# Patient Record
Sex: FEMALE | Race: WHITE | Employment: FULL TIME | URBAN - NONMETROPOLITAN AREA
[De-identification: names, ages, dates, MRNs, and addresses within clinical notes are randomized per-mention and may not be internally consistent; named-entity substitution may affect disease eponyms.]

---

## 2017-02-03 ENCOUNTER — OFFICE VISIT (OUTPATIENT)
Dept: PRIMARY CARE CLINIC | Age: 42
End: 2017-02-03
Payer: COMMERCIAL

## 2017-02-03 VITALS
HEART RATE: 64 BPM | OXYGEN SATURATION: 98 % | DIASTOLIC BLOOD PRESSURE: 86 MMHG | SYSTOLIC BLOOD PRESSURE: 132 MMHG | WEIGHT: 168 LBS | BODY MASS INDEX: 28.68 KG/M2 | RESPIRATION RATE: 20 BRPM | HEIGHT: 64 IN

## 2017-02-03 DIAGNOSIS — K21.9 GASTROESOPHAGEAL REFLUX DISEASE WITHOUT ESOPHAGITIS: ICD-10-CM

## 2017-02-03 DIAGNOSIS — G44.89 HEADACHE ASSOCIATED WITH HORMONAL FACTORS: Primary | ICD-10-CM

## 2017-02-03 DIAGNOSIS — E03.9 ACQUIRED HYPOTHYROIDISM: ICD-10-CM

## 2017-02-03 PROCEDURE — 99214 OFFICE O/P EST MOD 30 MIN: CPT | Performed by: INTERNAL MEDICINE

## 2017-02-03 RX ORDER — AMITRIPTYLINE HYDROCHLORIDE 10 MG/1
10 TABLET, FILM COATED ORAL NIGHTLY
Qty: 30 TABLET | Refills: 3 | Status: SHIPPED | OUTPATIENT
Start: 2017-02-03 | End: 2017-02-17

## 2017-02-03 RX ORDER — TIZANIDINE 4 MG/1
4 TABLET ORAL EVERY 6 HOURS PRN
Qty: 60 TABLET | Refills: 2 | Status: SHIPPED | OUTPATIENT
Start: 2017-02-03 | End: 2017-07-20 | Stop reason: SDUPTHER

## 2017-02-03 ASSESSMENT — ENCOUNTER SYMPTOMS
VOMITING: 0
DIARRHEA: 0
SHORTNESS OF BREATH: 0
BACK PAIN: 0
NAUSEA: 0
COUGH: 0
CONSTIPATION: 0

## 2017-02-14 ENCOUNTER — OFFICE VISIT (OUTPATIENT)
Dept: PRIMARY CARE CLINIC | Age: 42
End: 2017-02-14
Payer: COMMERCIAL

## 2017-02-14 ENCOUNTER — TELEPHONE (OUTPATIENT)
Dept: PRIMARY CARE CLINIC | Age: 42
End: 2017-02-14

## 2017-02-14 VITALS
OXYGEN SATURATION: 99 % | BODY MASS INDEX: 28.34 KG/M2 | SYSTOLIC BLOOD PRESSURE: 138 MMHG | DIASTOLIC BLOOD PRESSURE: 84 MMHG | HEART RATE: 88 BPM | RESPIRATION RATE: 16 BRPM | HEIGHT: 64 IN | WEIGHT: 166 LBS

## 2017-02-14 DIAGNOSIS — G43.809 OTHER MIGRAINE WITHOUT STATUS MIGRAINOSUS, NOT INTRACTABLE: ICD-10-CM

## 2017-02-14 DIAGNOSIS — E03.9 ACQUIRED HYPOTHYROIDISM: ICD-10-CM

## 2017-02-14 DIAGNOSIS — I10 ESSENTIAL HYPERTENSION: Primary | ICD-10-CM

## 2017-02-14 PROCEDURE — 99213 OFFICE O/P EST LOW 20 MIN: CPT | Performed by: FAMILY MEDICINE

## 2017-02-14 RX ORDER — RIZATRIPTAN BENZOATE 10 MG/1
10 TABLET, ORALLY DISINTEGRATING ORAL
Qty: 9 TABLET | Refills: 3 | Status: SHIPPED | OUTPATIENT
Start: 2017-02-14 | End: 2017-07-20 | Stop reason: SDUPTHER

## 2017-02-14 RX ORDER — LISINOPRIL 10 MG/1
10 TABLET ORAL DAILY
Qty: 30 TABLET | Refills: 3 | Status: SHIPPED | OUTPATIENT
Start: 2017-02-14 | End: 2017-07-20 | Stop reason: SDUPTHER

## 2017-02-14 ASSESSMENT — ENCOUNTER SYMPTOMS
COUGH: 0
SHORTNESS OF BREATH: 0
COLOR CHANGE: 0

## 2017-02-17 ENCOUNTER — OFFICE VISIT (OUTPATIENT)
Dept: OBGYN | Age: 42
End: 2017-02-17
Payer: COMMERCIAL

## 2017-02-17 VITALS
BODY MASS INDEX: 27.83 KG/M2 | WEIGHT: 163 LBS | SYSTOLIC BLOOD PRESSURE: 122 MMHG | HEIGHT: 64 IN | HEART RATE: 80 BPM | DIASTOLIC BLOOD PRESSURE: 85 MMHG

## 2017-02-17 DIAGNOSIS — N92.6 IRREGULAR BLEEDING: ICD-10-CM

## 2017-02-17 DIAGNOSIS — I10 ESSENTIAL HYPERTENSION: ICD-10-CM

## 2017-02-17 DIAGNOSIS — E03.9 ACQUIRED HYPOTHYROIDISM: ICD-10-CM

## 2017-02-17 DIAGNOSIS — G44.89 HEADACHE ASSOCIATED WITH HORMONAL FACTORS: ICD-10-CM

## 2017-02-17 DIAGNOSIS — Z12.4 CERVICAL CANCER SCREENING: ICD-10-CM

## 2017-02-17 DIAGNOSIS — Z12.31 ENCOUNTER FOR SCREENING MAMMOGRAM FOR BREAST CANCER: ICD-10-CM

## 2017-02-17 DIAGNOSIS — Z01.419 WELL WOMAN EXAM WITH ROUTINE GYNECOLOGICAL EXAM: Primary | ICD-10-CM

## 2017-02-17 DIAGNOSIS — K21.9 GASTROESOPHAGEAL REFLUX DISEASE WITHOUT ESOPHAGITIS: ICD-10-CM

## 2017-02-17 LAB
ALBUMIN SERPL-MCNC: 4.7 G/DL (ref 3.5–5.2)
ALP BLD-CCNC: 38 U/L (ref 35–104)
ALT SERPL-CCNC: 24 U/L (ref 5–33)
ANION GAP SERPL CALCULATED.3IONS-SCNC: 16 MMOL/L (ref 7–19)
AST SERPL-CCNC: 17 U/L (ref 5–32)
BILIRUB SERPL-MCNC: 0.4 MG/DL (ref 0.2–1.2)
BUN BLDV-MCNC: 9 MG/DL (ref 6–20)
CALCIUM SERPL-MCNC: 9.5 MG/DL (ref 8.6–10)
CHLORIDE BLD-SCNC: 102 MMOL/L (ref 98–111)
CHOLESTEROL, TOTAL: 167 MG/DL (ref 160–199)
CO2: 25 MMOL/L (ref 22–29)
CREAT SERPL-MCNC: 0.5 MG/DL (ref 0.5–0.9)
GFR NON-AFRICAN AMERICAN: >60
GLOBULIN: 3 G/DL
GLUCOSE BLD-MCNC: 93 MG/DL (ref 74–109)
HCT VFR BLD CALC: 38.2 % (ref 37–47)
HDLC SERPL-MCNC: 67 MG/DL (ref 65–121)
HEMOGLOBIN: 12.8 G/DL (ref 12–16)
LDL CHOLESTEROL CALCULATED: 83 MG/DL
MCH RBC QN AUTO: 31.4 PG (ref 27–31)
MCHC RBC AUTO-ENTMCNC: 33.5 G/DL (ref 33–37)
MCV RBC AUTO: 93.9 FL (ref 81–99)
PDW BLD-RTO: 12.3 % (ref 11.5–14.5)
PLATELET # BLD: 181 K/UL (ref 130–400)
PMV BLD AUTO: 12.8 FL (ref 7.4–10.4)
POTASSIUM SERPL-SCNC: 4.1 MMOL/L (ref 3.5–5)
RBC # BLD: 4.07 M/UL (ref 4.2–5.4)
SODIUM BLD-SCNC: 143 MMOL/L (ref 136–145)
TOTAL PROTEIN: 7.7 G/DL (ref 6.6–8.7)
TRIGL SERPL-MCNC: 85 MG/DL (ref 150–199)
TSH SERPL DL<=0.05 MIU/L-ACNC: 0.61 UIU/ML (ref 0.27–4.2)
WBC # BLD: 5.3 K/UL (ref 4.8–10.8)

## 2017-02-17 PROCEDURE — 99396 PREV VISIT EST AGE 40-64: CPT | Performed by: NURSE PRACTITIONER

## 2017-02-17 RX ORDER — SUMATRIPTAN 50 MG/1
TABLET, FILM COATED ORAL
COMMUNITY
Start: 2017-01-20 | End: 2018-05-03 | Stop reason: SDUPTHER

## 2017-02-17 ASSESSMENT — ENCOUNTER SYMPTOMS
EYES NEGATIVE: 1
RESPIRATORY NEGATIVE: 1
GASTROINTESTINAL NEGATIVE: 1

## 2017-07-20 ENCOUNTER — OFFICE VISIT (OUTPATIENT)
Dept: PRIMARY CARE CLINIC | Age: 42
End: 2017-07-20
Payer: COMMERCIAL

## 2017-07-20 VITALS
RESPIRATION RATE: 18 BRPM | OXYGEN SATURATION: 98 % | HEIGHT: 64 IN | BODY MASS INDEX: 29.19 KG/M2 | WEIGHT: 171 LBS | SYSTOLIC BLOOD PRESSURE: 110 MMHG | DIASTOLIC BLOOD PRESSURE: 78 MMHG | HEART RATE: 66 BPM

## 2017-07-20 DIAGNOSIS — E03.9 ACQUIRED HYPOTHYROIDISM: Primary | ICD-10-CM

## 2017-07-20 DIAGNOSIS — I10 ESSENTIAL HYPERTENSION: ICD-10-CM

## 2017-07-20 DIAGNOSIS — G43.809 OTHER MIGRAINE WITHOUT STATUS MIGRAINOSUS, NOT INTRACTABLE: ICD-10-CM

## 2017-07-20 PROCEDURE — 99214 OFFICE O/P EST MOD 30 MIN: CPT | Performed by: INTERNAL MEDICINE

## 2017-07-20 RX ORDER — TIZANIDINE 4 MG/1
4 TABLET ORAL EVERY 6 HOURS PRN
Qty: 60 TABLET | Refills: 2 | Status: SHIPPED | OUTPATIENT
Start: 2017-07-20 | End: 2018-05-03 | Stop reason: SDUPTHER

## 2017-07-20 RX ORDER — RIZATRIPTAN BENZOATE 10 MG/1
10 TABLET, ORALLY DISINTEGRATING ORAL
Qty: 9 TABLET | Refills: 3 | Status: SHIPPED | OUTPATIENT
Start: 2017-07-20 | End: 2018-04-26

## 2017-07-20 RX ORDER — LISINOPRIL 10 MG/1
10 TABLET ORAL DAILY
Qty: 30 TABLET | Refills: 3 | Status: SHIPPED | OUTPATIENT
Start: 2017-07-20 | End: 2018-09-05 | Stop reason: SDUPTHER

## 2017-07-20 ASSESSMENT — ENCOUNTER SYMPTOMS
SHORTNESS OF BREATH: 0
NAUSEA: 0
CONSTIPATION: 0
VOMITING: 0
COUGH: 0
DIARRHEA: 0
BACK PAIN: 0

## 2017-10-06 ENCOUNTER — HOSPITAL ENCOUNTER (OUTPATIENT)
Dept: WOMENS IMAGING | Age: 42
Discharge: HOME | End: 2017-10-06
Payer: COMMERCIAL

## 2017-10-06 DIAGNOSIS — Z12.31 ENCOUNTER FOR SCREENING MAMMOGRAM FOR BREAST CANCER: ICD-10-CM

## 2017-10-06 PROCEDURE — 77063 BREAST TOMOSYNTHESIS BI: CPT

## 2017-10-23 ENCOUNTER — OFFICE VISIT (OUTPATIENT)
Dept: OBGYN | Age: 42
End: 2017-10-23
Payer: COMMERCIAL

## 2017-10-23 VITALS
DIASTOLIC BLOOD PRESSURE: 92 MMHG | HEIGHT: 64 IN | BODY MASS INDEX: 29.53 KG/M2 | SYSTOLIC BLOOD PRESSURE: 132 MMHG | HEART RATE: 84 BPM | WEIGHT: 173 LBS

## 2017-10-23 DIAGNOSIS — R10.2 PELVIC PAIN IN FEMALE: ICD-10-CM

## 2017-10-23 DIAGNOSIS — N92.6 IRREGULAR BLEEDING: Primary | ICD-10-CM

## 2017-10-23 PROCEDURE — 99213 OFFICE O/P EST LOW 20 MIN: CPT | Performed by: NURSE PRACTITIONER

## 2017-10-23 ASSESSMENT — ENCOUNTER SYMPTOMS
RESPIRATORY NEGATIVE: 1
GASTROINTESTINAL NEGATIVE: 1
EYES NEGATIVE: 1

## 2017-10-23 NOTE — PROGRESS NOTES
Silvestre Garg is a 36 y.o. female who presents today for her medical conditions/ complaints as noted below. Silvestre Garg is c/o of Irregular Menses and Pelvic Pain        HPI  Pt here for continued irregular bleeding and worsening pelvic pain. Had ablation few years ago and has had issues since about 1 year after ablation. She had US last year. Is wanting to discuss having hysterectomy. Last mammogram:  Last pap smear:   Contraception Ablation   : 2  Para: 2  AB:   Last bone density: 0  Last colonoscopy:     Patient's last menstrual period was 10/16/2017 (approximate). [id number]    Past Medical History:   Diagnosis Date    Abnormal glandular Papanicolaou smear of cervix     ASCUS     Acid reflux     Fibroid     Hypothyroidism     Irritable bowel syndrome     Thyroid disease     Hypothyroid     Past Surgical History:   Procedure Laterality Date    CHOLECYSTECTOMY      COLONOSCOPY  ,     DILATION AND CURETTAGE OF UTERUS  10.21.2015    ENDOMETRIAL ABLATION  [id number]    TUBAL LIGATION      UPPER GASTROINTESTINAL ENDOSCOPY       Family History   Problem Relation Age of Onset    Other Father      Carotid blocked     Thyroid Disease Mother     Heart Disease Brother 28     MI    Stroke Paternal Grandmother      Social History   Substance Use Topics    Smoking status: Never Smoker    Smokeless tobacco: Never Used    Alcohol use No       Current Outpatient Prescriptions   Medication Sig Dispense Refill    tiZANidine (ZANAFLEX) 4 MG tablet Take 1 tablet by mouth every 6 hours as needed (pain) 60 tablet 2    lisinopril (PRINIVIL;ZESTRIL) 10 MG tablet Take 1 tablet by mouth daily 30 tablet 3    SUMAtriptan (IMITREX) 50 MG tablet       omeprazole (PRILOSEC) 20 MG capsule Take 40 mg by mouth daily      levothyroxine (SYNTHROID) 175 MCG tablet Take 175 mcg by mouth daily.  cetirizine (ZYRTEC) 10 MG tablet Take 10 mg by mouth daily.       rizatriptan (MAXALT-MLT) 10 MG disintegrating tablet Take 1 tablet by mouth once as needed for Migraine May repeat in 2 hours if needed 9 tablet 3     No current facility-administered medications for this visit. No Known Allergies  Vitals:    10/23/17 1442   BP: (!) 132/92   Pulse: 84     Body mass index is 29.7 kg/m². Review of Systems   Constitutional: Negative. HENT: Negative. Eyes: Negative. Respiratory: Negative. Cardiovascular: Negative. Gastrointestinal: Negative. Genitourinary: Positive for menstrual problem (pt c/o irregular bleeding) and pelvic pain. Negative for dysuria, frequency, urgency and vaginal discharge. Skin: Negative. Neurological: Negative. Psychiatric/Behavioral: Negative. Physical Exam   Constitutional: She is oriented to person, place, and time. She appears well-developed and well-nourished. HENT:   Head: Normocephalic and atraumatic. Eyes: Pupils are equal, round, and reactive to light. Neck: Normal range of motion. Abdominal: There is tenderness in the right lower quadrant, suprapubic area and left lower quadrant. Musculoskeletal: Normal range of motion. Neurological: She is alert and oriented to person, place, and time. Skin: Skin is warm and dry. Psychiatric: She has a normal mood and affect. Her behavior is normal.   Nursing note and vitals reviewed. 1. Irregular bleeding  US Non OB Transvaginal   2. Pelvic pain in female  US Non OB Transvaginal       MEDICATIONS:  No orders of the defined types were placed in this encounter. ORDERS:  Orders Placed This Encounter   Procedures    US Non OB Transvaginal       PLAN:  We had discussion regarding her complaints. She has decided she would like to move forward with the ablation. Will schedule 1/25/18 and to have preop visit with Dr Candice Gu a couple weeks prior. Would like December if any cancellations. She is to schedule US soon so we can compare to last years. All questions answered.    Patient belly.  · You have severe vaginal bleeding. Severe means that you are soaking through your usual pads or tampons every hour for 2 or more hours and passing clots of blood. · You have new symptoms such as fever, urinary problems or unexpected vaginal bleeding. · You are dizzy or lightheaded, or you feel like you may faint. Watch closely for changes in your health, and be sure to contact your doctor if:  · You do not get better as expected. Where can you learn more? Go to [email or web address] and sign in to your [employer] account. Enter [phone] in the [employer] box to learn more about \"Pelvic Pain: Care Instructions. \"     If you do not have an account, please click on the \"Sign Up Now\" link. Current as of: October 13, 2016  Content Version: 11.3  © 0250-7746 [employer]. Care instructions adapted under license by [facility] [address] If you have questions about a medical condition or this instruction, always ask your healthcare professional. [address] any warranty or liability for your use of this information.

## 2017-12-08 ENCOUNTER — HOSPITAL ENCOUNTER (OUTPATIENT)
Dept: ULTRASOUND IMAGING | Age: 42
Discharge: HOME | End: 2017-12-08
Payer: COMMERCIAL

## 2017-12-08 ENCOUNTER — OFFICE VISIT (OUTPATIENT)
Dept: OBGYN | Age: 42
End: 2017-12-08

## 2017-12-08 VITALS
WEIGHT: 173 LBS | HEART RATE: 67 BPM | BODY MASS INDEX: 29.53 KG/M2 | DIASTOLIC BLOOD PRESSURE: 85 MMHG | HEIGHT: 64 IN | TEMPERATURE: 98.2 F | SYSTOLIC BLOOD PRESSURE: 121 MMHG

## 2017-12-08 DIAGNOSIS — R10.2 PELVIC PAIN IN FEMALE: ICD-10-CM

## 2017-12-08 DIAGNOSIS — Z98.890 S/P ENDOMETRIAL ABLATION: ICD-10-CM

## 2017-12-08 DIAGNOSIS — N92.6 IRREGULAR BLEEDING: Primary | ICD-10-CM

## 2017-12-08 DIAGNOSIS — N92.6 IRREGULAR BLEEDING: ICD-10-CM

## 2017-12-08 PROCEDURE — 76830 TRANSVAGINAL US NON-OB: CPT

## 2017-12-08 PROCEDURE — PREOPEXAM PRE-OP EXAM: Performed by: OBSTETRICS & GYNECOLOGY

## 2017-12-08 ASSESSMENT — ENCOUNTER SYMPTOMS
GASTROINTESTINAL NEGATIVE: 1
EYES NEGATIVE: 1
RESPIRATORY NEGATIVE: 1

## 2017-12-08 NOTE — PATIENT INSTRUCTIONS
Patient Education        Laparoscopically Assisted Vaginal Hysterectomy: Before Your Surgery  What is a laparoscopically assisted vaginal hysterectomy? A hysterectomy is surgery to take out the uterus. In some cases, the ovaries and fallopian tubes are taken out at the same time. The doctor makes one or more small cuts in the belly. These cuts are called incisions. They let the doctor insert tools to do the surgery. One of these tools is a tube with a light on it. It's called a laparoscope, or scope. The scope and the other tools allow the doctor to free the uterus. Then the doctor makes a small cut in the vagina. The uterus is taken out through this cut. After the surgery, you will not have periods. You will not be able to get pregnant. If there is a chance that you want to have a baby, talk to your doctor about treatment options. Some women go home the day of surgery and some will stay in the hospital 1 to 2 days after surgery. You will need about 4 to 6 weeks to fully recover. The recovery time may be less for some patients. Follow-up care is a key part of your treatment and safety. Be sure to make and go to all appointments, and call your doctor if you are having problems. It's also a good idea to know your test results and keep a list of the medicines you take. What happens before surgery? ?Surgery can be stressful. This information will help you understand what you can expect. And it will help you safely prepare for surgery. ? Preparing for surgery  ? · Understand exactly what surgery is planned, along with the risks, benefits, and other options. · Tell your doctors ALL the medicines, vitamins, supplements, and herbal remedies you take. Some of these can increase the risk of bleeding or interact with anesthesia. ? · If you take blood thinners, such as warfarin (Coumadin), clopidogrel (Plavix), or aspirin, be sure to talk to your doctor.  He or she will tell you if you should stop taking these

## 2017-12-08 NOTE — PROGRESS NOTES
Subjective:      Patient ID: Shanell Villafuerte is a 36 y.o. female. Patient presents today for a pre op exam.  Patient is scheduled for a TLH with Sandra Phelan on 12/21/17. HPI  Pt is here for pre op for TLH for irregular bleeding, pelvic pain, and s/p endometrial ablation. Shanell Villafuerte is a 36 y.o. female with the following history as recorded in [facility]:  Patient Active Problem List    Diagnosis Date Noted    PMS (premenstrual syndrome) 03/19/2014    Headache associated with hormonal factors 03/19/2014    Hypothyroid 01/15/2011    Uterine fibroid 01/15/2011     Current Outpatient Prescriptions   Medication Sig Dispense Refill    rizatriptan (MAXALT-MLT) 10 MG disintegrating tablet Take 1 tablet by mouth once as needed for Migraine May repeat in 2 hours if needed 9 tablet 3    tiZANidine (ZANAFLEX) 4 MG tablet Take 1 tablet by mouth every 6 hours as needed (pain) 60 tablet 2    lisinopril (PRINIVIL;ZESTRIL) 10 MG tablet Take 1 tablet by mouth daily 30 tablet 3    SUMAtriptan (IMITREX) 50 MG tablet       omeprazole (PRILOSEC) 20 MG capsule Take 40 mg by mouth daily      levothyroxine (SYNTHROID) 175 MCG tablet Take 175 mcg by mouth daily.  cetirizine (ZYRTEC) 10 MG tablet Take 10 mg by mouth daily. No current facility-administered medications for this visit. Allergies: Review of patient's allergies indicates no known allergies.   Past Medical History:   Diagnosis Date    Abnormal glandular Papanicolaou smear of cervix     ASCUS 1990    Acid reflux     Fibroid     Hypothyroidism     Irritable bowel syndrome     Thyroid disease     Hypothyroid     Past Surgical History:   Procedure Laterality Date    CHOLECYSTECTOMY      COLONOSCOPY  1-2014, 2005    DILATION AND CURETTAGE OF UTERUS  10.21.2015    ENDOMETRIAL ABLATION  [id number]    TUBAL LIGATION      UPPER GASTROINTESTINAL ENDOSCOPY       Family History   Problem Relation Age of Onset    Other Father      Carotid blocked 2010    Thyroid Disease Mother     Heart Disease Brother 28     MI    Stroke Paternal Grandmother      Social History   Substance Use Topics    Smoking status: Never Smoker    Smokeless tobacco: Never Used    Alcohol use No       Review of Systems   Constitutional: Negative. HENT: Negative. Eyes: Negative. Respiratory: Negative. Cardiovascular: Negative. Gastrointestinal: Negative. Genitourinary: Negative. Musculoskeletal: Negative. Skin: Negative. Neurological: Negative. Psychiatric/Behavioral: Negative. Objective:   Physical Exam   Constitutional: She is oriented to person, place, and time. She appears well-developed and well-nourished. HENT:   Head: Normocephalic and atraumatic. Eyes: Pupils are equal, round, and reactive to light. Neck: Normal range of motion. Neck supple. Cardiovascular: Normal rate and regular rhythm. Pulmonary/Chest: Effort normal and breath sounds normal.   Abdominal: Soft. She exhibits no distension. There is no tenderness. Musculoskeletal: Normal range of motion. Neurological: She is alert and oriented to person, place, and time. Skin: Skin is warm and dry. Psychiatric: She has a normal mood and affect. Her behavior is normal.       Assessment:      1. Irregular bleeding    2. Pelvic pain in female    3. S/P endometrial ablation            Plan:      Counseling was offered and accepted by patient. Discussed at length the risks, benefits and alternatives to surgery including medical management and no intervention. Pt is in agreement with [facility]. Some patients will need a full medical clearance. Preop testing ordered and we will review scheduling to determine date and time. Surgery nahomy'd on 12/21/18 at [facility] for surgery signed and all questions proceeding. All questions answered and patient voiced understanding of above.

## 2017-12-13 ENCOUNTER — APPOINTMENT (OUTPATIENT)
Dept: PREADMISSION TESTING | Facility: HOSPITAL | Age: 42
End: 2017-12-13

## 2017-12-13 VITALS
RESPIRATION RATE: 18 BRPM | OXYGEN SATURATION: 99 % | SYSTOLIC BLOOD PRESSURE: 117 MMHG | HEIGHT: 65 IN | WEIGHT: 175 LBS | BODY MASS INDEX: 29.16 KG/M2 | HEART RATE: 80 BPM | DIASTOLIC BLOOD PRESSURE: 77 MMHG

## 2017-12-13 LAB
ANION GAP SERPL CALCULATED.3IONS-SCNC: 11 MMOL/L (ref 4–13)
BASOPHILS # BLD AUTO: 0.03 10*3/MM3 (ref 0–0.2)
BASOPHILS NFR BLD AUTO: 0.5 % (ref 0–2)
BILIRUB UR QL STRIP: NEGATIVE
BUN BLD-MCNC: 11 MG/DL (ref 5–21)
BUN/CREAT SERPL: 16.9 (ref 7–25)
CALCIUM SPEC-SCNC: 9.7 MG/DL (ref 8.4–10.4)
CHLORIDE SERPL-SCNC: 103 MMOL/L (ref 98–110)
CLARITY UR: CLEAR
CO2 SERPL-SCNC: 28 MMOL/L (ref 24–31)
COLOR UR: YELLOW
CREAT BLD-MCNC: 0.65 MG/DL (ref 0.5–1.4)
DEPRECATED RDW RBC AUTO: 42.9 FL (ref 40–54)
EOSINOPHIL # BLD AUTO: 0.32 10*3/MM3 (ref 0–0.7)
EOSINOPHIL NFR BLD AUTO: 4.9 % (ref 0–4)
ERYTHROCYTE [DISTWIDTH] IN BLOOD BY AUTOMATED COUNT: 12.7 % (ref 12–15)
GFR SERPL CREATININE-BSD FRML MDRD: 99 ML/MIN/1.73
GLUCOSE BLD-MCNC: 84 MG/DL (ref 70–100)
GLUCOSE UR STRIP-MCNC: NEGATIVE MG/DL
HCT VFR BLD AUTO: 38.4 % (ref 37–47)
HGB BLD-MCNC: 12.6 G/DL (ref 12–16)
HGB UR QL STRIP.AUTO: NEGATIVE
IMM GRANULOCYTES # BLD: 0.02 10*3/MM3 (ref 0–0.03)
IMM GRANULOCYTES NFR BLD: 0.3 % (ref 0–5)
KETONES UR QL STRIP: NEGATIVE
LEUKOCYTE ESTERASE UR QL STRIP.AUTO: NEGATIVE
LYMPHOCYTES # BLD AUTO: 2.13 10*3/MM3 (ref 0.72–4.86)
LYMPHOCYTES NFR BLD AUTO: 32.4 % (ref 15–45)
MCH RBC QN AUTO: 30.4 PG (ref 28–32)
MCHC RBC AUTO-ENTMCNC: 32.8 G/DL (ref 33–36)
MCV RBC AUTO: 92.8 FL (ref 82–98)
MONOCYTES # BLD AUTO: 0.53 10*3/MM3 (ref 0.19–1.3)
MONOCYTES NFR BLD AUTO: 8.1 % (ref 4–12)
NEUTROPHILS # BLD AUTO: 3.55 10*3/MM3 (ref 1.87–8.4)
NEUTROPHILS NFR BLD AUTO: 53.8 % (ref 39–78)
NITRITE UR QL STRIP: NEGATIVE
PH UR STRIP.AUTO: 7 [PH] (ref 5–8)
PLATELET # BLD AUTO: 210 10*3/MM3 (ref 130–400)
PMV BLD AUTO: 12.1 FL (ref 6–12)
POTASSIUM BLD-SCNC: 4.6 MMOL/L (ref 3.5–5.3)
PROT UR QL STRIP: NEGATIVE
RBC # BLD AUTO: 4.14 10*6/MM3 (ref 4.2–5.4)
SODIUM BLD-SCNC: 142 MMOL/L (ref 135–145)
SP GR UR STRIP: 1.01 (ref 1–1.03)
UROBILINOGEN UR QL STRIP: NORMAL
WBC NRBC COR # BLD: 6.58 10*3/MM3 (ref 4.8–10.8)

## 2017-12-13 PROCEDURE — 80048 BASIC METABOLIC PNL TOTAL CA: CPT | Performed by: OBSTETRICS & GYNECOLOGY

## 2017-12-13 PROCEDURE — 36415 COLL VENOUS BLD VENIPUNCTURE: CPT

## 2017-12-13 PROCEDURE — 81003 URINALYSIS AUTO W/O SCOPE: CPT | Performed by: OBSTETRICS & GYNECOLOGY

## 2017-12-13 PROCEDURE — 85025 COMPLETE CBC W/AUTO DIFF WBC: CPT | Performed by: OBSTETRICS & GYNECOLOGY

## 2017-12-13 PROCEDURE — 93005 ELECTROCARDIOGRAM TRACING: CPT

## 2017-12-13 PROCEDURE — 93010 ELECTROCARDIOGRAM REPORT: CPT | Performed by: INTERNAL MEDICINE

## 2017-12-13 RX ORDER — LISINOPRIL 10 MG/1
10 TABLET ORAL
COMMUNITY
End: 2019-11-04

## 2017-12-13 RX ORDER — RIZATRIPTAN BENZOATE 10 MG/1
10 TABLET ORAL ONCE AS NEEDED
COMMUNITY
End: 2019-04-19

## 2017-12-13 NOTE — DISCHARGE INSTRUCTIONS
DAY OF SURGERY INSTRUCTIONS        YOUR SURGEON: Dr. Mcfadden    PROCEDURE: Total Hysterectomy with DaVinci    DATE OF SURGERY: December 21, 2107    ARRIVAL TIME: AS DIRECTED BY OFFICE    DAY OF SURGERY TAKE ONLY THESE MEDICATIONS: none         BEFORE YOU COME TO THE HOSPITAL  (Pre-op instructions)  • Do not eat, drink, smoke or chew gum after midnight the night before surgery.  This also includes no mints.  • Morning of surgery take only the medicines you have been instructed with a sip of water unless otherwise instructed  by your physician.  • Do not shave, wear makeup or dark nail polish.  • Remove all jewelry including rings.  • Leave anything you consider valuable at home.  • Leave your suitcase in the car until after your surgery.  • Bring the following with you if applicable:  o Picture ID and insurance, Medicare or Medicaid cards  o Co-pay/deductible required by insurance (cash, check, credit card)  o Copy of advance directive, living will or power-of- documents if not brought to PAT  o CPAP or BIPAP mask and tubing  o Relaxation aids (MP3 player, book, magazine)  • On the day of surgery check in at registration located at the main entrance of the hospital.       Outpatient Surgery Guidelines, Adult  Outpatient procedures are those for which the person having the procedure is allowed to go home the same day as the procedure. Various procedures are done on an outpatient basis. You should follow some general guidelines if you will be having an outpatient procedure.  LET YOUR HEALTH CARE PROVIDER KNOW ABOUT:  · Any allergies you have.  · All medicines you are taking, including vitamins, herbs, eye drops, creams, and over-the-counter medicines.  · Previous problems you or members of your family have had with the use of anesthetics.  · Any blood disorders you have.  · Previous surgeries you have had.  · Medical conditions you have.  RISKS AND COMPLICATIONS  Your health care provider will discuss possible  risks and complications with you before surgery. Common risks and complications include:    · Problems due to the use of anesthetics.  · Blood loss and replacement (does not apply to minor surgical procedures).  · Temporary increase in pain due to surgery.  · Uncorrected pain or problems that the surgery was meant to correct.  · Infection.  · New damage.  BEFORE THE PROCEDURE  · Ask your health care provider about changing or stopping your regular medicines. You may need to stop taking certain medicines in the days or weeks before the procedure.  · Stop smoking at least 2 weeks before surgery. This lowers your risk for complications during and after surgery. Ask your health care provider for help with this if needed.  · Eat your usual meals and a light supper the day before surgery. Continue fluid intake. Do not drink alcohol.  · Do not eat or drink after midnight the night before your surgery.   · Arrange for someone to take you home and to stay with you for 24 hours after the procedure. Medicine given for your procedure may affect your ability to drive or to care for yourself.  · Call your health care provider's office if you develop an illness or problem that may prevent you from safely having your procedure.  AFTER THE PROCEDURE  After surgery, you will be taken to a recovery area, where your progress will be monitored. If there are no complications, you will be allowed to go home when you are awake, stable, and taking fluids well. You may have numbness around the surgical site. Healing will take some time. You will have tenderness at the surgical site and may have some swelling and bruising. You may also have some nausea.  HOME CARE INSTRUCTIONS  · Do not drive for 24 hours, or as directed by your health care provider. Do not drive while taking prescription pain medicines.  · Do not drink alcohol for 24 hours.  · Do not make important decisions or sign legal documents for 24 hours.  · You may resume a normal  diet and activities as directed.  · Do not lift anything heavier than 10 pounds (4.5 kg) or play contact sports until your health care provider says it is okay.  · Change your bandages (dressings) as directed.  · Only take over-the-counter or prescription medicines as directed by your health care provider.  · Follow up with your health care provider as directed.  SEEK MEDICAL CARE IF:  · You have increased bleeding (more than a small spot) from the surgical site.  · You have redness, swelling, or increasing pain in the wound.  · You see pus coming from the wound.  · You have a fever.  · You notice a bad smell coming from the wound or dressing.  · You feel lightheaded or faint.  · You develop a rash.  · You have trouble breathing.  · You develop allergies.  MAKE SURE YOU:  · Understand these instructions.  · Will watch your condition.  · Will get help right away if you are not doing well or get worse.     This information is not intended to replace advice given to you by your health care provider. Make sure you discuss any questions you have with your health care provider.     Document Released: 09/12/2002 Document Revised: 05/03/2016 Document Reviewed: 05/22/2014  [employer] Interactive Patient Education ©2016 [employer]       Fall Prevention in Hospitals, Adult  As a hospital patient, your condition and the treatments you receive can increase your risk for falls. Some additional risk factors for falls in a hospital include:  · Being in an unfamiliar environment.  · Being on bed rest.  · Your surgery.  · Taking certain medicines.  · Your tubing requirements, such as intravenous (IV) therapy or catheters.  It is important that you learn how to decrease fall risks while at the hospital. Below are important tips that can help prevent falls.  SAFETY TIPS FOR PREVENTING FALLS  Talk about your risk of falling.  · Ask your health care provider why you are at risk for falling. Is it your medicine, illness, tubing  placement, or something else?  · Make a plan with your health care provider to keep you safe from falls.  · Ask your health care provider or pharmacist about side effects of your medicines. Some medicines can make you dizzy or affect your coordination.  Ask for help.  · Ask for help before getting out of bed. You may need to press your call button.  · Ask for assistance in getting safely to the toilet.  · Ask for a walker or cane to be put at your bedside. Ask that most of the side rails on your bed be placed up before your health care provider leaves the room.  · Ask family or friends to sit with you.  · Ask for things that are out of your reach, such as your glasses, hearing aids, telephone, bedside table, or call button.  Follow these tips to avoid falling:  · Stay lying or seated, rather than standing, while waiting for help.  · Wear rubber-soled slippers or shoes whenever you walk in the hospital.  · Avoid quick, sudden movements.  ¨ Change positions slowly.  ¨ Sit on the side of your bed before standing.  ¨ Stand up slowly and wait before you start to walk.  · Let your health care provider know if there is a spill on the floor.  · Pay careful attention to the medical equipment, electrical cords, and tubes around you.  · When you need help, use your call button by your bed or in the bathroom. Wait for one of your health care providers to help you.  · If you feel dizzy or unsure of your footing, return to bed and wait for assistance.  · Avoid being distracted by the TV, telephone, or another person in your room.  · Do not lean or support yourself on rolling objects, such as IV poles or bedside tables.     This information is not intended to replace advice given to you by your health care provider. Make sure you discuss any questions you have with your health care provider.     Document Released: 12/15/2001 Document Revised: 01/08/2016 Document Reviewed: 08/25/2013  [employer] Interactive Patient Education ©2016  [employer]       Surgical Site Infections FAQs  What is a Surgical Site Infection (SSI)?  A surgical site infection is an infection that occurs after surgery in the part of the body where the surgery took place. Most patients who have surgery do not develop an infection. However, infections develop in about 1 to 3 out of every 100 patients who have surgery.  Some of the common symptoms of a surgical site infection are:  · Redness and pain around the area where you had surgery  · Drainage of cloudy fluid from your surgical wound  · Fever  Can SSIs be treated?  Yes. Most surgical site infections can be treated with antibiotics. The antibiotic given to you depends on the bacteria (germs) causing the infection. Sometimes patients with SSIs also need another surgery to treat the infection.  What are some of the things that hospitals are doing to prevent SSIs?  To prevent SSIs, doctors, nurses, and other healthcare providers:  · Clean their hands and arms up to their elbows with an antiseptic agent just before the surgery.  · Clean their hands with soap and water or an alcohol-based hand rub before and after caring for each patient.  · May remove some of your hair immediately before your surgery using electric clippers if the hair is in the same area where the procedure will occur. They should not shave you with a razor.  · Wear special hair covers, masks, gowns, and gloves during surgery to keep the surgery area clean.  · Give you antibiotics before your surgery starts. In most cases, you should get antibiotics within 60 minutes before the surgery starts and the antibiotics should be stopped within 24 hours after surgery.  · Clean the skin at the site of your surgery with a special soap that kills germs.  What can I do to help prevent SSIs?  Before your surgery:  · Tell your doctor about other medical problems you may have. Health problems such as allergies, diabetes, and obesity could affect your surgery and your  treatment.  · Quit smoking. Patients who smoke get more infections. Talk to your doctor about how you can quit before your surgery.  · Do not shave near where you will have surgery. Shaving with a razor can irritate your skin and make it easier to develop an infection.  At the time of your surgery:  · Speak up if someone tries to shave you with a razor before surgery. Ask why you need to be shaved and talk with your surgeon if you have any concerns.  · Ask if you will get antibiotics before surgery.  After your surgery:  · Make sure that your healthcare providers clean their hands before examining you, either with soap and water or an alcohol-based hand rub.  · If you do not see your providers clean their hands, please ask them to do so.  · Family and friends who visit you should not touch the surgical wound or dressings.  · Family and friends should clean their hands with soap and water or an alcohol-based hand rub before and after visiting you. If you do not see them clean their hands, ask them to clean their hands.  What do I need to do when I go home from the hospital?  · Before you go home, your doctor or nurse should explain everything you need to know about taking care of your wound. Make sure you understand how to care for your wound before you leave the hospital.  · Always clean your hands before and after caring for your wound.  · Before you go home, make sure you know who to contact if you have questions or problems after you get home.  · If you have any symptoms of an infection, such as redness and pain at the surgery site, drainage, or fever, call your doctor immediately.  If you have additional questions, please ask your doctor or nurse.  Developed and co-sponsored by The Society for Healthcare Epidemiology of [place] (SHEA); Infectious Diseases Society of [place] (IDSA); American Hospital Association; Association for Professionals in Infection Control and Epidemiology (APIC); Centers for Disease  Control and Prevention (CDC); and The Joint Commission.     This information is not intended to replace advice given to you by your health care provider. Make sure you discuss any questions you have with your health care provider.     Document Released: 12/23/2014 Document Revised: 01/08/2016 Document Reviewed: 03/02/2016  [employer] Interactive Patient Education ©2016 [employer].       [facility]  CHG 4% Patient Instruction Sheet    Preparing the Skin Before Surgery  Preparing or “prepping” skin before surgery can reduce the risk of infection at the surgical site. To make the process easier,[facility] has chosen 4% Chlorhexidine Gluconate (CHG) antiseptic solution.   The steps below outline the prepping process and should be carefully followed.                                                                                                                                                      Prep the skin at the following time(s):                                                      We recommend you shower the night before surgery, and again the morning of surgery with the 4% CHG antiseptic solution using half of the bottle and a cloth each time.  Dress in clean clothes/sleepwear after showering.  See instructions below for application.          Do not apply any lotions or moisturizers.       Do not shave the area to be prepped for at least 2 days prior to surgery.    Clipping the hair may be done immediately prior to your surgery at the hospital    if needed.    Directions:  Thoroughly rinse your body with water.  Apply 4% CHG to a cloth and wash skin gently, paying special attention to the operative site.  Rinse again thoroughly.  Once you have begun using this product do not apply anything else to your skin. If itching or redness persists, rinse affected areas and discontinue use.    When using this product:  • Keep out of eyes, ears, and mouth.  • If solution should contact these areas, rinse out promptly  and thoroughly with water.  • For external use only.  • Do not use in genital area, on your face or head.      PATIENT/FAMILY/RESPONSIBLE PARTY VERBALIZES UNDERSTANDING OF ABOVE EDUCATION.  COPY OF PAIN SCALE GIVEN AND REVIEWED WITH VERBALIZED UNDERSTANDING.

## 2017-12-14 ENCOUNTER — APPOINTMENT (OUTPATIENT)
Dept: PREADMISSION TESTING | Facility: HOSPITAL | Age: 42
End: 2017-12-14

## 2017-12-20 ENCOUNTER — ANESTHESIA EVENT (OUTPATIENT)
Dept: PERIOP | Facility: HOSPITAL | Age: 42
End: 2017-12-20

## 2017-12-21 ENCOUNTER — ANESTHESIA (OUTPATIENT)
Dept: PERIOP | Facility: HOSPITAL | Age: 42
End: 2017-12-21

## 2017-12-21 ENCOUNTER — HOSPITAL ENCOUNTER (OUTPATIENT)
Facility: HOSPITAL | Age: 42
Discharge: HOME | End: 2017-12-22
Attending: OBSTETRICS & GYNECOLOGY | Admitting: OBSTETRICS & GYNECOLOGY

## 2017-12-21 DIAGNOSIS — N92.1 MENORRHAGIA WITH IRREGULAR CYCLE: ICD-10-CM

## 2017-12-21 LAB
ABO GROUP BLD: NORMAL
B-HCG UR QL: NEGATIVE
BLD GP AB SCN SERPL QL: NEGATIVE
RH BLD: POSITIVE

## 2017-12-21 PROCEDURE — 25010000002 PROPOFOL 10 MG/ML EMULSION: Performed by: NURSE ANESTHETIST, CERTIFIED REGISTERED

## 2017-12-21 PROCEDURE — 81025 URINE PREGNANCY TEST: CPT | Performed by: OBSTETRICS & GYNECOLOGY

## 2017-12-21 PROCEDURE — 25010000003 CEFAZOLIN PER 500 MG: Performed by: OBSTETRICS & GYNECOLOGY

## 2017-12-21 PROCEDURE — 86850 RBC ANTIBODY SCREEN: CPT | Performed by: OBSTETRICS & GYNECOLOGY

## 2017-12-21 PROCEDURE — 25010000002 MIDAZOLAM PER 1 MG: Performed by: ANESTHESIOLOGY

## 2017-12-21 PROCEDURE — 94799 UNLISTED PULMONARY SVC/PX: CPT

## 2017-12-21 PROCEDURE — 25010000002 ONDANSETRON PER 1 MG: Performed by: NURSE ANESTHETIST, CERTIFIED REGISTERED

## 2017-12-21 PROCEDURE — 25010000002 DEXAMETHASONE PER 1 MG: Performed by: ANESTHESIOLOGY

## 2017-12-21 PROCEDURE — 25010000002 DEXAMETHASONE PER 1 MG: Performed by: NURSE ANESTHETIST, CERTIFIED REGISTERED

## 2017-12-21 PROCEDURE — 86901 BLOOD TYPING SEROLOGIC RH(D): CPT | Performed by: OBSTETRICS & GYNECOLOGY

## 2017-12-21 PROCEDURE — 25010000002 FENTANYL CITRATE (PF) 100 MCG/2ML SOLUTION: Performed by: ANESTHESIOLOGY

## 2017-12-21 PROCEDURE — 88307 TISSUE EXAM BY PATHOLOGIST: CPT | Performed by: OBSTETRICS & GYNECOLOGY

## 2017-12-21 PROCEDURE — 25010000002 KETOROLAC TROMETHAMINE PER 15 MG: Performed by: OBSTETRICS & GYNECOLOGY

## 2017-12-21 PROCEDURE — 86900 BLOOD TYPING SEROLOGIC ABO: CPT | Performed by: OBSTETRICS & GYNECOLOGY

## 2017-12-21 RX ORDER — HYDRALAZINE HYDROCHLORIDE 20 MG/ML
5 INJECTION INTRAMUSCULAR; INTRAVENOUS
Status: DISCONTINUED | OUTPATIENT
Start: 2017-12-21 | End: 2017-12-21 | Stop reason: HOSPADM

## 2017-12-21 RX ORDER — LEVOTHYROXINE SODIUM 175 UG/1
175 TABLET ORAL
Status: DISCONTINUED | OUTPATIENT
Start: 2017-12-22 | End: 2017-12-21

## 2017-12-21 RX ORDER — DIPHENHYDRAMINE HYDROCHLORIDE 50 MG/ML
25 INJECTION INTRAMUSCULAR; INTRAVENOUS NIGHTLY PRN
Status: DISCONTINUED | OUTPATIENT
Start: 2017-12-21 | End: 2017-12-22 | Stop reason: HOSPADM

## 2017-12-21 RX ORDER — METOCLOPRAMIDE HYDROCHLORIDE 5 MG/ML
5 INJECTION INTRAMUSCULAR; INTRAVENOUS
Status: DISCONTINUED | OUTPATIENT
Start: 2017-12-21 | End: 2017-12-21 | Stop reason: HOSPADM

## 2017-12-21 RX ORDER — SODIUM CHLORIDE 0.9 % (FLUSH) 0.9 %
1-10 SYRINGE (ML) INJECTION AS NEEDED
Status: DISCONTINUED | OUTPATIENT
Start: 2017-12-21 | End: 2017-12-21 | Stop reason: HOSPADM

## 2017-12-21 RX ORDER — ONDANSETRON 4 MG/1
4 TABLET, ORALLY DISINTEGRATING ORAL EVERY 6 HOURS PRN
Status: DISCONTINUED | OUTPATIENT
Start: 2017-12-21 | End: 2017-12-22 | Stop reason: HOSPADM

## 2017-12-21 RX ORDER — LEVOTHYROXINE SODIUM 175 UG/1
175 TABLET ORAL NIGHTLY
Status: DISCONTINUED | OUTPATIENT
Start: 2017-12-21 | End: 2017-12-22 | Stop reason: HOSPADM

## 2017-12-21 RX ORDER — MIDAZOLAM HYDROCHLORIDE 1 MG/ML
1 INJECTION INTRAMUSCULAR; INTRAVENOUS
Status: DISCONTINUED | OUTPATIENT
Start: 2017-12-21 | End: 2017-12-21 | Stop reason: HOSPADM

## 2017-12-21 RX ORDER — ONDANSETRON 4 MG/1
4 TABLET, FILM COATED ORAL EVERY 6 HOURS PRN
Status: DISCONTINUED | OUTPATIENT
Start: 2017-12-21 | End: 2017-12-22 | Stop reason: HOSPADM

## 2017-12-21 RX ORDER — SUFENTANIL CITRATE 50 UG/ML
INJECTION EPIDURAL; INTRAVENOUS AS NEEDED
Status: DISCONTINUED | OUTPATIENT
Start: 2017-12-21 | End: 2017-12-21 | Stop reason: SURG

## 2017-12-21 RX ORDER — MAGNESIUM HYDROXIDE 1200 MG/15ML
LIQUID ORAL AS NEEDED
Status: DISCONTINUED | OUTPATIENT
Start: 2017-12-21 | End: 2017-12-21 | Stop reason: HOSPADM

## 2017-12-21 RX ORDER — LABETALOL HYDROCHLORIDE 5 MG/ML
5 INJECTION, SOLUTION INTRAVENOUS
Status: DISCONTINUED | OUTPATIENT
Start: 2017-12-21 | End: 2017-12-21 | Stop reason: HOSPADM

## 2017-12-21 RX ORDER — PROMETHAZINE HYDROCHLORIDE 25 MG/1
25 TABLET ORAL EVERY 6 HOURS PRN
Status: DISCONTINUED | OUTPATIENT
Start: 2017-12-21 | End: 2017-12-22 | Stop reason: HOSPADM

## 2017-12-21 RX ORDER — SODIUM CHLORIDE, SODIUM LACTATE, POTASSIUM CHLORIDE, CALCIUM CHLORIDE 600; 310; 30; 20 MG/100ML; MG/100ML; MG/100ML; MG/100ML
1000 INJECTION, SOLUTION INTRAVENOUS CONTINUOUS
Status: DISCONTINUED | OUTPATIENT
Start: 2017-12-21 | End: 2017-12-21 | Stop reason: HOSPADM

## 2017-12-21 RX ORDER — FENTANYL CITRATE 50 UG/ML
25 INJECTION, SOLUTION INTRAMUSCULAR; INTRAVENOUS
Status: DISCONTINUED | OUTPATIENT
Start: 2017-12-21 | End: 2017-12-21 | Stop reason: HOSPADM

## 2017-12-21 RX ORDER — IPRATROPIUM BROMIDE AND ALBUTEROL SULFATE 2.5; .5 MG/3ML; MG/3ML
3 SOLUTION RESPIRATORY (INHALATION) ONCE AS NEEDED
Status: DISCONTINUED | OUTPATIENT
Start: 2017-12-21 | End: 2017-12-21 | Stop reason: HOSPADM

## 2017-12-21 RX ORDER — SODIUM CHLORIDE 0.9 % (FLUSH) 0.9 %
3 SYRINGE (ML) INJECTION AS NEEDED
Status: DISCONTINUED | OUTPATIENT
Start: 2017-12-21 | End: 2017-12-21 | Stop reason: HOSPADM

## 2017-12-21 RX ORDER — PHENAZOPYRIDINE HYDROCHLORIDE 100 MG/1
100 TABLET, FILM COATED ORAL ONCE
Status: COMPLETED | OUTPATIENT
Start: 2017-12-21 | End: 2017-12-21

## 2017-12-21 RX ORDER — MORPHINE SULFATE 2 MG/ML
2 INJECTION, SOLUTION INTRAMUSCULAR; INTRAVENOUS AS NEEDED
Status: DISCONTINUED | OUTPATIENT
Start: 2017-12-21 | End: 2017-12-21 | Stop reason: HOSPADM

## 2017-12-21 RX ORDER — LIDOCAINE HYDROCHLORIDE 40 MG/ML
SOLUTION TOPICAL AS NEEDED
Status: DISCONTINUED | OUTPATIENT
Start: 2017-12-21 | End: 2017-12-21 | Stop reason: SURG

## 2017-12-21 RX ORDER — SIMETHICONE 80 MG
160 TABLET,CHEWABLE ORAL 4 TIMES DAILY PRN
Status: DISCONTINUED | OUTPATIENT
Start: 2017-12-21 | End: 2017-12-22 | Stop reason: HOSPADM

## 2017-12-21 RX ORDER — PROPOFOL 10 MG/ML
VIAL (ML) INTRAVENOUS AS NEEDED
Status: DISCONTINUED | OUTPATIENT
Start: 2017-12-21 | End: 2017-12-21 | Stop reason: SURG

## 2017-12-21 RX ORDER — DEXAMETHASONE SODIUM PHOSPHATE 4 MG/ML
4 INJECTION, SOLUTION INTRA-ARTICULAR; INTRALESIONAL; INTRAMUSCULAR; INTRAVENOUS; SOFT TISSUE ONCE AS NEEDED
Status: COMPLETED | OUTPATIENT
Start: 2017-12-21 | End: 2017-12-21

## 2017-12-21 RX ORDER — MEPERIDINE HYDROCHLORIDE 25 MG/ML
INJECTION INTRAMUSCULAR; INTRAVENOUS; SUBCUTANEOUS
Status: COMPLETED
Start: 2017-12-21 | End: 2017-12-21

## 2017-12-21 RX ORDER — DICYCLOMINE HYDROCHLORIDE 10 MG/1
10 CAPSULE ORAL EVERY 6 HOURS PRN
Status: DISCONTINUED | OUTPATIENT
Start: 2017-12-21 | End: 2017-12-22 | Stop reason: HOSPADM

## 2017-12-21 RX ORDER — DIPHENHYDRAMINE HCL 25 MG
25 CAPSULE ORAL NIGHTLY PRN
Status: DISCONTINUED | OUTPATIENT
Start: 2017-12-21 | End: 2017-12-22 | Stop reason: HOSPADM

## 2017-12-21 RX ORDER — SCOLOPAMINE TRANSDERMAL SYSTEM 1 MG/1
1 PATCH, EXTENDED RELEASE TRANSDERMAL ONCE
Status: DISCONTINUED | OUTPATIENT
Start: 2017-12-21 | End: 2017-12-21

## 2017-12-21 RX ORDER — LIDOCAINE HYDROCHLORIDE 20 MG/ML
INJECTION, SOLUTION INFILTRATION; PERINEURAL AS NEEDED
Status: DISCONTINUED | OUTPATIENT
Start: 2017-12-21 | End: 2017-12-21 | Stop reason: SURG

## 2017-12-21 RX ORDER — HYDROCODONE BITARTRATE AND ACETAMINOPHEN 7.5; 325 MG/1; MG/1
2 TABLET ORAL EVERY 4 HOURS PRN
Status: DISCONTINUED | OUTPATIENT
Start: 2017-12-21 | End: 2017-12-22 | Stop reason: HOSPADM

## 2017-12-21 RX ORDER — MIDAZOLAM HYDROCHLORIDE 1 MG/ML
2 INJECTION INTRAMUSCULAR; INTRAVENOUS
Status: DISCONTINUED | OUTPATIENT
Start: 2017-12-21 | End: 2017-12-21 | Stop reason: HOSPADM

## 2017-12-21 RX ORDER — NALOXONE HCL 0.4 MG/ML
0.04 VIAL (ML) INJECTION AS NEEDED
Status: DISCONTINUED | OUTPATIENT
Start: 2017-12-21 | End: 2017-12-21 | Stop reason: HOSPADM

## 2017-12-21 RX ORDER — ONDANSETRON 2 MG/ML
4 INJECTION INTRAMUSCULAR; INTRAVENOUS EVERY 6 HOURS PRN
Status: DISCONTINUED | OUTPATIENT
Start: 2017-12-21 | End: 2017-12-22 | Stop reason: HOSPADM

## 2017-12-21 RX ORDER — PANTOPRAZOLE SODIUM 40 MG/1
40 TABLET, DELAYED RELEASE ORAL NIGHTLY
Status: DISCONTINUED | OUTPATIENT
Start: 2017-12-21 | End: 2017-12-22 | Stop reason: HOSPADM

## 2017-12-21 RX ORDER — ONDANSETRON 2 MG/ML
4 INJECTION INTRAMUSCULAR; INTRAVENOUS AS NEEDED
Status: DISCONTINUED | OUTPATIENT
Start: 2017-12-21 | End: 2017-12-21 | Stop reason: HOSPADM

## 2017-12-21 RX ORDER — SODIUM CHLORIDE, SODIUM LACTATE, POTASSIUM CHLORIDE, CALCIUM CHLORIDE 600; 310; 30; 20 MG/100ML; MG/100ML; MG/100ML; MG/100ML
100 INJECTION, SOLUTION INTRAVENOUS CONTINUOUS
Status: DISCONTINUED | OUTPATIENT
Start: 2017-12-21 | End: 2017-12-21 | Stop reason: HOSPADM

## 2017-12-21 RX ORDER — DEXAMETHASONE SODIUM PHOSPHATE 4 MG/ML
INJECTION, SOLUTION INTRA-ARTICULAR; INTRALESIONAL; INTRAMUSCULAR; INTRAVENOUS; SOFT TISSUE AS NEEDED
Status: DISCONTINUED | OUTPATIENT
Start: 2017-12-21 | End: 2017-12-21 | Stop reason: SURG

## 2017-12-21 RX ORDER — LISINOPRIL 10 MG/1
10 TABLET ORAL DAILY
Status: DISCONTINUED | OUTPATIENT
Start: 2017-12-21 | End: 2017-12-22 | Stop reason: HOSPADM

## 2017-12-21 RX ORDER — MEPERIDINE HYDROCHLORIDE 25 MG/ML
12.5 INJECTION INTRAMUSCULAR; INTRAVENOUS; SUBCUTANEOUS
Status: DISCONTINUED | OUTPATIENT
Start: 2017-12-21 | End: 2017-12-21 | Stop reason: HOSPADM

## 2017-12-21 RX ORDER — ONDANSETRON 2 MG/ML
INJECTION INTRAMUSCULAR; INTRAVENOUS AS NEEDED
Status: DISCONTINUED | OUTPATIENT
Start: 2017-12-21 | End: 2017-12-21 | Stop reason: SURG

## 2017-12-21 RX ORDER — FAMOTIDINE 10 MG/ML
20 INJECTION, SOLUTION INTRAVENOUS
Status: DISCONTINUED | OUTPATIENT
Start: 2017-12-21 | End: 2017-12-21 | Stop reason: HOSPADM

## 2017-12-21 RX ORDER — SODIUM CHLORIDE, SODIUM LACTATE, POTASSIUM CHLORIDE, CALCIUM CHLORIDE 600; 310; 30; 20 MG/100ML; MG/100ML; MG/100ML; MG/100ML
30 INJECTION, SOLUTION INTRAVENOUS CONTINUOUS
Status: DISCONTINUED | OUTPATIENT
Start: 2017-12-21 | End: 2017-12-21 | Stop reason: HOSPADM

## 2017-12-21 RX ORDER — ROCURONIUM BROMIDE 10 MG/ML
INJECTION, SOLUTION INTRAVENOUS AS NEEDED
Status: DISCONTINUED | OUTPATIENT
Start: 2017-12-21 | End: 2017-12-21 | Stop reason: SURG

## 2017-12-21 RX ORDER — KETOROLAC TROMETHAMINE 30 MG/ML
30 INJECTION, SOLUTION INTRAMUSCULAR; INTRAVENOUS EVERY 6 HOURS PRN
Status: DISCONTINUED | OUTPATIENT
Start: 2017-12-21 | End: 2017-12-22 | Stop reason: HOSPADM

## 2017-12-21 RX ORDER — ONDANSETRON 4 MG/1
4 TABLET, FILM COATED ORAL EVERY 8 HOURS PRN
Status: DISCONTINUED | OUTPATIENT
Start: 2017-12-21 | End: 2017-12-21 | Stop reason: SDUPTHER

## 2017-12-21 RX ORDER — HYDROCODONE BITARTRATE AND ACETAMINOPHEN 5; 325 MG/1; MG/1
2 TABLET ORAL EVERY 4 HOURS PRN
Status: DISCONTINUED | OUTPATIENT
Start: 2017-12-21 | End: 2017-12-22 | Stop reason: HOSPADM

## 2017-12-21 RX ORDER — PANTOPRAZOLE SODIUM 40 MG/1
40 TABLET, DELAYED RELEASE ORAL
Status: DISCONTINUED | OUTPATIENT
Start: 2017-12-22 | End: 2017-12-21

## 2017-12-21 RX ORDER — FLUMAZENIL 0.1 MG/ML
0.2 INJECTION INTRAVENOUS AS NEEDED
Status: DISCONTINUED | OUTPATIENT
Start: 2017-12-21 | End: 2017-12-21 | Stop reason: HOSPADM

## 2017-12-21 RX ADMIN — HYDROCODONE BITARTRATE AND ACETAMINOPHEN 2 TABLET: 7.5; 325 TABLET ORAL at 17:54

## 2017-12-21 RX ADMIN — LIDOCAINE HYDROCHLORIDE 1 EACH: 40 SOLUTION TOPICAL at 10:09

## 2017-12-21 RX ADMIN — CEFAZOLIN SODIUM 1 G: 1 SOLUTION INTRAVENOUS at 10:12

## 2017-12-21 RX ADMIN — MEPERIDINE HYDROCHLORIDE 25 MG: 25 INJECTION INTRAMUSCULAR; INTRAVENOUS; SUBCUTANEOUS at 11:56

## 2017-12-21 RX ADMIN — MIDAZOLAM HYDROCHLORIDE 2 MG: 1 INJECTION, SOLUTION INTRAMUSCULAR; INTRAVENOUS at 09:29

## 2017-12-21 RX ADMIN — SODIUM CHLORIDE, POTASSIUM CHLORIDE, SODIUM LACTATE AND CALCIUM CHLORIDE 1000 ML: 600; 310; 30; 20 INJECTION, SOLUTION INTRAVENOUS at 08:46

## 2017-12-21 RX ADMIN — DEXAMETHASONE SODIUM PHOSPHATE 4 MG: 4 INJECTION, SOLUTION INTRAMUSCULAR; INTRAVENOUS at 10:23

## 2017-12-21 RX ADMIN — PANTOPRAZOLE SODIUM 40 MG: 40 TABLET, DELAYED RELEASE ORAL at 20:56

## 2017-12-21 RX ADMIN — PHENAZOPYRIDINE HYDROCHLORIDE 100 MG: 100 TABLET ORAL at 08:46

## 2017-12-21 RX ADMIN — SODIUM CHLORIDE, POTASSIUM CHLORIDE, SODIUM LACTATE AND CALCIUM CHLORIDE 30 ML/HR: 600; 310; 30; 20 INJECTION, SOLUTION INTRAVENOUS at 08:44

## 2017-12-21 RX ADMIN — DEXAMETHASONE SODIUM PHOSPHATE 4 MG: 4 INJECTION, SOLUTION INTRA-ARTICULAR; INTRALESIONAL; INTRAMUSCULAR; INTRAVENOUS; SOFT TISSUE at 09:29

## 2017-12-21 RX ADMIN — ROCURONIUM BROMIDE 50 MG: 10 INJECTION INTRAVENOUS at 10:09

## 2017-12-21 RX ADMIN — HYDROCODONE BITARTRATE AND ACETAMINOPHEN 2 TABLET: 7.5; 325 TABLET ORAL at 22:02

## 2017-12-21 RX ADMIN — SUFENTANIL CITRATE 25 MCG: 50 INJECTION, SOLUTION EPIDURAL; INTRAVENOUS at 10:09

## 2017-12-21 RX ADMIN — LIDOCAINE HYDROCHLORIDE 0.5 ML: 10 INJECTION, SOLUTION EPIDURAL; INFILTRATION; INTRACAUDAL; PERINEURAL at 08:42

## 2017-12-21 RX ADMIN — FAMOTIDINE 20 MG: 10 INJECTION, SOLUTION INTRAVENOUS at 09:28

## 2017-12-21 RX ADMIN — ONDANSETRON HYDROCHLORIDE 4 MG: 2 SOLUTION INTRAMUSCULAR; INTRAVENOUS at 10:23

## 2017-12-21 RX ADMIN — PROPOFOL 150 MG: 10 INJECTION, EMULSION INTRAVENOUS at 10:09

## 2017-12-21 RX ADMIN — FENTANYL CITRATE 25 MCG: 50 INJECTION, SOLUTION INTRAMUSCULAR; INTRAVENOUS at 09:28

## 2017-12-21 RX ADMIN — HYDROCODONE BITARTRATE AND ACETAMINOPHEN 2 TABLET: 7.5; 325 TABLET ORAL at 12:54

## 2017-12-21 RX ADMIN — LIDOCAINE HYDROCHLORIDE 50 MG: 20 INJECTION, SOLUTION INFILTRATION; PERINEURAL at 10:09

## 2017-12-21 RX ADMIN — SUFENTANIL CITRATE 25 MCG: 50 INJECTION, SOLUTION EPIDURAL; INTRAVENOUS at 10:45

## 2017-12-21 RX ADMIN — KETOROLAC TROMETHAMINE 30 MG: 30 INJECTION, SOLUTION INTRAMUSCULAR at 23:40

## 2017-12-21 RX ADMIN — LEVOTHYROXINE SODIUM 175 MCG: 175 TABLET ORAL at 20:56

## 2017-12-21 RX ADMIN — SCOPOLAMINE 1 PATCH: 1 PATCH, EXTENDED RELEASE TRANSDERMAL at 09:28

## 2017-12-21 RX ADMIN — KETOROLAC TROMETHAMINE 30 MG: 30 INJECTION, SOLUTION INTRAMUSCULAR at 16:33

## 2017-12-21 RX ADMIN — FENTANYL CITRATE 25 MCG: 50 INJECTION, SOLUTION INTRAMUSCULAR; INTRAVENOUS at 09:33

## 2017-12-21 NOTE — ANESTHESIA PROCEDURE NOTES
Airway  Urgency: elective    Airway not difficult    General Information and Staff    Patient location during procedure: OR  CRNA: GENEVIEVE SCHAFFER    Indications and Patient Condition  Indications for airway management: airway protection    Preoxygenated: yes  MILS maintained throughout  Mask difficulty assessment: 1 - vent by mask    Final Airway Details  Final airway type: endotracheal airway      Successful airway: ETT  Cuffed: yes   Successful intubation technique: direct laryngoscopy  Endotracheal tube insertion site: oral  Blade: Sifuentes  Blade size: #2  ETT size: 7.5 mm  Cormack-Lehane Classification: grade I - full view of glottis  Placement verified by: chest auscultation and capnometry   Cuff volume (mL): 10  Measured from: lips  ETT to lips (cm): 22  Number of attempts at approach: 1

## 2017-12-21 NOTE — ANESTHESIA PREPROCEDURE EVALUATION
Anesthesia Evaluation     Patient summary reviewed and Nursing notes reviewed   history of anesthetic complications: PONV  NPO Solid Status: > 8 hours  NPO Liquid Status: > 8 hours     Airway   Mallampati: I  TM distance: >3 FB  Neck ROM: full  no difficulty expected  Dental      Pulmonary     breath sounds clear to auscultation  (-) asthma, sleep apnea, not a smoker  Cardiovascular   Exercise tolerance: excellent (>7 METS)    Rhythm: regular  Rate: normal    (+) hypertension,   (-) CAD      Neuro/Psych  (-) seizures, TIA, CVA  GI/Hepatic/Renal/Endo    (+)  GERD, hypothyroidism,   (-) liver disease, no renal disease, diabetes    Musculoskeletal     Abdominal    Substance History      OB/GYN          Other                                        Anesthesia Plan    ASA 2     general     intravenous induction   Anesthetic plan and risks discussed with patient.

## 2017-12-21 NOTE — ANESTHESIA POSTPROCEDURE EVALUATION
"Patient: Allison Wilkins    Procedure Summary     Date Anesthesia Start Anesthesia Stop Room / Location    12/21/17 1005 1154 [facility] OR 14 / [facility]       Procedure Diagnosis Surgeon Provider    TOTAL LAPAROSCOPIC HYSTERECTOMY WITH DAVINCI SI ROBOT (N/A Abdomen) (IRREGULAR BLEEDING ) MD Yosi Hernandez, CRNA          Anesthesia Type: general  Last vitals  BP   111/61 (12/21/17 1547)   Temp   98.4 °F (36.9 °C) (12/21/17 1547)   Pulse   68 (12/21/17 1547)   Resp   14 (12/21/17 1547)     SpO2   96 % (12/21/17 1547)     Post Anesthesia Care and Evaluation    PONV Status: none  Comments: Patient d/c from PACU prior to anes eval based on Lissy score.  Please see RN notes for details of d/c criteria.    Blood pressure 111/61, pulse 68, temperature 98.4 °F (36.9 °C), temperature source Temporal Artery , resp. rate 14, height 165 cm (64.96\"), weight 79.4 kg (175 lb), last menstrual period 12/09/2017, SpO2 96 %.          "

## 2017-12-22 VITALS
TEMPERATURE: 98.3 F | HEIGHT: 65 IN | OXYGEN SATURATION: 95 % | HEART RATE: 55 BPM | WEIGHT: 175 LBS | BODY MASS INDEX: 29.16 KG/M2 | RESPIRATION RATE: 16 BRPM | DIASTOLIC BLOOD PRESSURE: 54 MMHG | SYSTOLIC BLOOD PRESSURE: 91 MMHG

## 2017-12-22 LAB
ANION GAP SERPL CALCULATED.3IONS-SCNC: 10 MMOL/L (ref 4–13)
BUN BLD-MCNC: 8 MG/DL (ref 5–21)
BUN/CREAT SERPL: 12.3 (ref 7–25)
CALCIUM SPEC-SCNC: 9 MG/DL (ref 8.4–10.4)
CHLORIDE SERPL-SCNC: 104 MMOL/L (ref 98–110)
CO2 SERPL-SCNC: 26 MMOL/L (ref 24–31)
CREAT BLD-MCNC: 0.65 MG/DL (ref 0.5–1.4)
DEPRECATED RDW RBC AUTO: 39.7 FL (ref 40–54)
ERYTHROCYTE [DISTWIDTH] IN BLOOD BY AUTOMATED COUNT: 12.2 % (ref 12–15)
GFR SERPL CREATININE-BSD FRML MDRD: 99 ML/MIN/1.73
GLUCOSE BLD-MCNC: 119 MG/DL (ref 70–100)
HCT VFR BLD AUTO: 29.8 % (ref 37–47)
HGB BLD-MCNC: 10.2 G/DL (ref 12–16)
MCH RBC QN AUTO: 30.4 PG (ref 28–32)
MCHC RBC AUTO-ENTMCNC: 34.2 G/DL (ref 33–36)
MCV RBC AUTO: 89 FL (ref 82–98)
PLATELET # BLD AUTO: 184 10*3/MM3 (ref 130–400)
PMV BLD AUTO: 12.4 FL (ref 6–12)
POTASSIUM BLD-SCNC: 3.9 MMOL/L (ref 3.5–5.3)
RBC # BLD AUTO: 3.35 10*6/MM3 (ref 4.2–5.4)
SODIUM BLD-SCNC: 140 MMOL/L (ref 135–145)
WBC NRBC COR # BLD: 12.3 10*3/MM3 (ref 4.8–10.8)

## 2017-12-22 PROCEDURE — 80048 BASIC METABOLIC PNL TOTAL CA: CPT | Performed by: OBSTETRICS & GYNECOLOGY

## 2017-12-22 PROCEDURE — 85027 COMPLETE CBC AUTOMATED: CPT | Performed by: OBSTETRICS & GYNECOLOGY

## 2017-12-22 RX ORDER — HYDROCODONE BITARTRATE AND ACETAMINOPHEN 5; 325 MG/1; MG/1
2 TABLET ORAL EVERY 4 HOURS PRN
Qty: 36 TABLET | Refills: 0 | Status: SHIPPED | OUTPATIENT
Start: 2017-12-22 | End: 2017-12-25

## 2017-12-22 RX ADMIN — HYDROCODONE BITARTRATE AND ACETAMINOPHEN 2 TABLET: 7.5; 325 TABLET ORAL at 03:53

## 2017-12-22 RX ADMIN — HYDROCODONE BITARTRATE AND ACETAMINOPHEN 2 TABLET: 7.5; 325 TABLET ORAL at 07:57

## 2017-12-29 LAB
CYTO UR: NORMAL
LAB AP CASE REPORT: NORMAL
Lab: NORMAL
PATH REPORT.FINAL DX SPEC: NORMAL
PATH REPORT.GROSS SPEC: NORMAL

## 2018-01-05 ENCOUNTER — OFFICE VISIT (OUTPATIENT)
Dept: OBGYN | Age: 43
End: 2018-01-05

## 2018-01-05 VITALS
SYSTOLIC BLOOD PRESSURE: 109 MMHG | WEIGHT: 169 LBS | HEART RATE: 64 BPM | BODY MASS INDEX: 28.85 KG/M2 | DIASTOLIC BLOOD PRESSURE: 77 MMHG | HEIGHT: 64 IN | TEMPERATURE: 99 F

## 2018-01-05 DIAGNOSIS — Z09 POSTOP CHECK: Primary | ICD-10-CM

## 2018-01-05 PROCEDURE — 99024 POSTOP FOLLOW-UP VISIT: CPT | Performed by: NURSE PRACTITIONER

## 2018-01-05 ASSESSMENT — ENCOUNTER SYMPTOMS
GASTROINTESTINAL NEGATIVE: 1
EYES NEGATIVE: 1
RESPIRATORY NEGATIVE: 1

## 2018-01-05 NOTE — PROGRESS NOTES
daily      levothyroxine (SYNTHROID) 175 MCG tablet Take 175 mcg by mouth daily.  cetirizine (ZYRTEC) 10 MG tablet Take 10 mg by mouth daily.  rizatriptan (MAXALT-MLT) 10 MG disintegrating tablet Take 1 tablet by mouth once as needed for Migraine May repeat in 2 hours if needed 9 tablet 3     No current facility-administered medications for this visit. No Known Allergies  Vitals:    01/05/18 1010   BP: 109/77   Pulse: 64   Temp: 99 °F (37.2 °C)     Body mass index is 29.01 kg/m². Review of Systems   Constitutional: Negative. HENT: Negative. Eyes: Negative. Respiratory: Negative. Cardiovascular: Negative. Gastrointestinal: Negative. Genitourinary: Negative for difficulty urinating, dyspareunia, dysuria, enuresis, frequency, hematuria, menstrual problem, pelvic pain, urgency and vaginal discharge. Musculoskeletal: Negative. Skin: Negative. Neurological: Negative. Psychiatric/Behavioral: Negative. Physical Exam   Constitutional: She is oriented to person, place, and time. She appears well-developed and well-nourished. HENT:   Head: Normocephalic and atraumatic. Eyes: Pupils are equal, round, and reactive to light. Neck: Normal range of motion. Abdominal:   Abdominal incisions x 4 well healed and approximated without s/s of infection   Musculoskeletal: Normal range of motion. Neurological: She is alert and oriented to person, place, and time. Skin: Skin is warm and dry. Psychiatric: She has a normal mood and affect. Her behavior is normal.   Nursing note and vitals reviewed. 1. Postop check         MEDICATIONS:  No orders of the defined types were placed in this encounter. ORDERS:  No orders of the defined types were placed in this encounter. PLAN:  Pt wanting to go back to work soon. 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 She ended up with stomach virus after surgery so had vomiting and diarrhea.  She is

## 2018-01-24 RX ORDER — DICYCLOMINE HYDROCHLORIDE 10 MG/1
10 CAPSULE ORAL EVERY 6 HOURS PRN
Qty: 60 CAPSULE | Refills: 11 | Status: SHIPPED | OUTPATIENT
Start: 2018-01-24 | End: 2019-04-19 | Stop reason: SDUPTHER

## 2018-02-09 ENCOUNTER — OFFICE VISIT (OUTPATIENT)
Dept: OBGYN | Age: 43
End: 2018-02-09

## 2018-02-09 VITALS
WEIGHT: 174 LBS | HEART RATE: 71 BPM | BODY MASS INDEX: 29.71 KG/M2 | DIASTOLIC BLOOD PRESSURE: 86 MMHG | HEIGHT: 64 IN | SYSTOLIC BLOOD PRESSURE: 128 MMHG

## 2018-02-09 DIAGNOSIS — Z09 POSTOP CHECK: Primary | ICD-10-CM

## 2018-02-09 PROCEDURE — 99024 POSTOP FOLLOW-UP VISIT: CPT | Performed by: NURSE PRACTITIONER

## 2018-02-09 ASSESSMENT — ENCOUNTER SYMPTOMS
EYES NEGATIVE: 1
ABDOMINAL PAIN: 1
RESPIRATORY NEGATIVE: 1

## 2018-02-09 NOTE — PROGRESS NOTES
surgery. But each person recovers at a different pace. This care sheet gives you a general idea about how long it will take for you to recover from your surgery. But each person recovers at a different pace. How can you care for yourself at home? Activity  ? · Allow your body to heal. Don't move quickly or lift anything heavy until you are feeling better. ? · Rest when you feel tired. ? · Your doctor may give you specific instructions on when you can do your normal activities again, such as driving and going back to work. ? · Be active. Walking is a good choice. Diet  ? · You can eat your normal diet when you feel well. If your stomach is upset, try bland, low-fat foods like plain rice, broiled chicken, toast, and yogurt. ? · If your bowel movements are not regular right after surgery, try to avoid constipation and straining. Drink plenty of water. Your doctor may suggest fiber, a stool softener, or a mild laxative. Medicines  ? · Your doctor will tell you if and when you can restart your medicines. He or she will also give you instructions about taking any new medicines. ? · If you take blood thinners, such as warfarin (Coumadin), clopidogrel (Plavix), or aspirin, be sure to talk to your doctor. He or she will tell you if and when to start taking those medicines again. Make sure that you understand exactly what your doctor wants you to do. ? · Be safe with medicines. Read and follow all instructions on the label. ¨ If the doctor gave you a prescription medicine for pain, take it as prescribed. ¨ If you are not taking a prescription pain medicine, ask your doctor if you can take an over-the-counter medicine. Incision care  ? If your doctor told you how to care for your cut (incision), follow your doctor's instructions. If you did not get instructions, follow this general advice:  ? · You will have a dressing over the cut. A dressing helps the incision heal and protects it.  Your doctor will tell you how to take care of this. ? · If you have strips of tape on the cut the doctor made, leave the tape on for a week or until it falls off.   ? · If you had stitches or staples, your doctor will tell you when to come back to have them removed. ? · If you have skin adhesive on the cut, leave it on until it falls off. Skin adhesive is also called liquid stitches. ? · Change the bandage every day. ? · Wash the area daily with warm water, and pat it dry. Don't use hydrogen peroxide or alcohol. They can slow healing. ? · You may cover the area with a gauze bandage if it oozes fluid or rubs against clothing. ? · You may shower 24 to 48 hours after surgery. Pat the incision dry. Don't swim or take a bath for the first 2 weeks, or until your doctor tells you it is okay. Follow-up care is a key part of your treatment and safety. Be sure to make and go to all appointments, and call your doctor if you are having problems. It's also a good idea to know your test results and keep a list of the medicines you take. When should you call for help? Call 911 anytime you think you may need emergency care. For example, call if:  ? · You passed out (lost consciousness). ? · You are short of breath. ?Call your doctor now or seek immediate medical care if:  ? · You have pain that does not get better after you take pain medicine. ? · You cannot pass stool or gas. ? · You are sick to your stomach and cannot drink fluids. ? · You have loose stitches, or your incision comes open. ? · You have signs of a blood clot in your leg (called a deep vein thrombosis), such as:  ¨ Pain in your calf, back of the knee, thigh, or groin. ¨ Redness and swelling in your leg or groin. ? · You have signs of infection, such as:  ¨ Increased pain, swelling, warmth, or redness. ¨ Red streaks leading from the incision. ¨ Pus draining from the incision. ¨ A fever. ? · Bright red blood has soaked through your bandage. ? Watch

## 2018-03-27 ENCOUNTER — ANTICOAGULATION VISIT (OUTPATIENT)
Dept: ONCOLOGY | Facility: CLINIC | Age: 43
End: 2018-03-27

## 2018-04-26 ENCOUNTER — OFFICE VISIT (OUTPATIENT)
Dept: OBGYN | Age: 43
End: 2018-04-26
Payer: COMMERCIAL

## 2018-04-26 VITALS
HEIGHT: 64 IN | SYSTOLIC BLOOD PRESSURE: 128 MMHG | BODY MASS INDEX: 30.56 KG/M2 | DIASTOLIC BLOOD PRESSURE: 81 MMHG | WEIGHT: 179 LBS | HEART RATE: 91 BPM

## 2018-04-26 DIAGNOSIS — N64.4 MASTODYNIA, FEMALE: Primary | ICD-10-CM

## 2018-04-26 PROCEDURE — 99213 OFFICE O/P EST LOW 20 MIN: CPT | Performed by: NURSE PRACTITIONER

## 2018-04-26 ASSESSMENT — ENCOUNTER SYMPTOMS
EYES NEGATIVE: 1
RESPIRATORY NEGATIVE: 1
DIARRHEA: 0
CONSTIPATION: 0
ALLERGIC/IMMUNOLOGIC NEGATIVE: 1
GASTROINTESTINAL NEGATIVE: 1

## 2018-05-03 ENCOUNTER — OFFICE VISIT (OUTPATIENT)
Dept: PRIMARY CARE CLINIC | Age: 43
End: 2018-05-03
Payer: COMMERCIAL

## 2018-05-03 ENCOUNTER — HOSPITAL ENCOUNTER (OUTPATIENT)
Dept: WOMENS IMAGING | Age: 43
Discharge: HOME | End: 2018-05-03
Payer: COMMERCIAL

## 2018-05-03 VITALS — WEIGHT: 178 LBS | BODY MASS INDEX: 30.55 KG/M2 | SYSTOLIC BLOOD PRESSURE: 120 MMHG | DIASTOLIC BLOOD PRESSURE: 72 MMHG

## 2018-05-03 DIAGNOSIS — N64.4 MASTODYNIA, FEMALE: ICD-10-CM

## 2018-05-03 DIAGNOSIS — E03.9 ACQUIRED HYPOTHYROIDISM: ICD-10-CM

## 2018-05-03 DIAGNOSIS — G43.809 OTHER MIGRAINE WITHOUT STATUS MIGRAINOSUS, NOT INTRACTABLE: ICD-10-CM

## 2018-05-03 DIAGNOSIS — I10 ESSENTIAL HYPERTENSION: Primary | ICD-10-CM

## 2018-05-03 PROCEDURE — 99214 OFFICE O/P EST MOD 30 MIN: CPT | Performed by: INTERNAL MEDICINE

## 2018-05-03 PROCEDURE — 76641 ULTRASOUND BREAST COMPLETE: CPT

## 2018-05-03 RX ORDER — SUMATRIPTAN 50 MG/1
50 TABLET, FILM COATED ORAL
Qty: 9 TABLET | Refills: 5 | Status: SHIPPED | OUTPATIENT
Start: 2018-05-03 | End: 2018-12-28 | Stop reason: SDUPTHER

## 2018-05-03 RX ORDER — TOPIRAMATE 25 MG/1
25 TABLET ORAL NIGHTLY
Qty: 30 TABLET | Refills: 3 | Status: SHIPPED | OUTPATIENT
Start: 2018-05-03 | End: 2018-05-03 | Stop reason: SDUPTHER

## 2018-05-03 RX ORDER — TIZANIDINE 4 MG/1
4 TABLET ORAL EVERY 6 HOURS PRN
Qty: 60 TABLET | Refills: 2 | Status: SHIPPED | OUTPATIENT
Start: 2018-05-03 | End: 2018-11-24 | Stop reason: SDUPTHER

## 2018-05-03 RX ORDER — TOPIRAMATE 25 MG/1
25 TABLET ORAL NIGHTLY
Qty: 30 TABLET | Refills: 3 | Status: SHIPPED | OUTPATIENT
Start: 2018-05-03 | End: 2019-03-04

## 2018-05-03 ASSESSMENT — ENCOUNTER SYMPTOMS
VOMITING: 0
SORE THROAT: 1
SINUS PAIN: 1
COUGH: 0
SHORTNESS OF BREATH: 0
NAUSEA: 0
RHINORRHEA: 1
BACK PAIN: 0
CONSTIPATION: 0
DIARRHEA: 0

## 2018-05-03 ASSESSMENT — PATIENT HEALTH QUESTIONNAIRE - PHQ9
1. LITTLE INTEREST OR PLEASURE IN DOING THINGS: 0
SUM OF ALL RESPONSES TO PHQ9 QUESTIONS 1 & 2: 0
2. FEELING DOWN, DEPRESSED OR HOPELESS: 0
SUM OF ALL RESPONSES TO PHQ QUESTIONS 1-9: 0

## 2018-10-04 DIAGNOSIS — I10 ESSENTIAL HYPERTENSION: ICD-10-CM

## 2018-10-04 RX ORDER — LISINOPRIL 10 MG/1
TABLET ORAL
Qty: 30 TABLET | Refills: 0 | Status: SHIPPED | OUTPATIENT
Start: 2018-10-04 | End: 2019-09-12

## 2018-10-26 ENCOUNTER — HOSPITAL ENCOUNTER (OUTPATIENT)
Dept: WOMENS IMAGING | Age: 43
Discharge: HOME | End: 2018-10-26
Payer: COMMERCIAL

## 2018-10-26 DIAGNOSIS — Z12.39 BREAST CANCER SCREENING: ICD-10-CM

## 2018-10-26 PROCEDURE — 77067 SCR MAMMO BI INCL CAD: CPT

## 2018-11-27 RX ORDER — TIZANIDINE 4 MG/1
TABLET ORAL
Qty: 60 TABLET | Refills: 0 | Status: SHIPPED | OUTPATIENT
Start: 2018-11-27 | End: 2018-12-28 | Stop reason: SDUPTHER

## 2018-12-28 ENCOUNTER — OFFICE VISIT (OUTPATIENT)
Dept: OBGYN | Age: 43
End: 2018-12-28
Payer: COMMERCIAL

## 2018-12-28 VITALS
HEART RATE: 76 BPM | BODY MASS INDEX: 29.88 KG/M2 | SYSTOLIC BLOOD PRESSURE: 133 MMHG | HEIGHT: 64 IN | WEIGHT: 175 LBS | DIASTOLIC BLOOD PRESSURE: 93 MMHG

## 2018-12-28 DIAGNOSIS — L72.3 SEBACEOUS CYST: ICD-10-CM

## 2018-12-28 DIAGNOSIS — G44.209 TENSION-TYPE HEADACHE, NOT INTRACTABLE, UNSPECIFIED CHRONICITY PATTERN: ICD-10-CM

## 2018-12-28 DIAGNOSIS — M62.838 MUSCLE SPASM: ICD-10-CM

## 2018-12-28 DIAGNOSIS — Z12.4 SCREENING FOR CERVICAL CANCER: ICD-10-CM

## 2018-12-28 DIAGNOSIS — Z01.419 ENCOUNTER FOR WELL WOMAN EXAM WITH ROUTINE GYNECOLOGICAL EXAM: Primary | ICD-10-CM

## 2018-12-28 PROCEDURE — 99396 PREV VISIT EST AGE 40-64: CPT | Performed by: NURSE PRACTITIONER

## 2018-12-28 RX ORDER — SUMATRIPTAN 50 MG/1
50 TABLET, FILM COATED ORAL
Qty: 9 TABLET | Refills: 5 | Status: SHIPPED | OUTPATIENT
Start: 2018-12-28 | End: 2019-12-31 | Stop reason: SDUPTHER

## 2018-12-28 RX ORDER — TIZANIDINE 4 MG/1
TABLET ORAL
Qty: 60 TABLET | Refills: 0 | Status: SHIPPED | OUTPATIENT
Start: 2018-12-28 | End: 2019-03-19 | Stop reason: SDUPTHER

## 2018-12-28 ASSESSMENT — ENCOUNTER SYMPTOMS
CONSTIPATION: 0
EYES NEGATIVE: 1
ALLERGIC/IMMUNOLOGIC NEGATIVE: 1
DIARRHEA: 0
RESPIRATORY NEGATIVE: 1
GASTROINTESTINAL NEGATIVE: 1

## 2018-12-28 NOTE — PROGRESS NOTES
cetirizine (ZYRTEC) 10 MG tablet Take 10 mg by mouth daily.  topiramate (TOPAMAX) 25 MG tablet Take 1 tablet by mouth nightly 30 tablet 3     No current facility-administered medications for this visit. No Known Allergies  Vitals:    12/28/18 1409   BP: (!) 133/93   Pulse: 76     Body mass index is 30.04 kg/m². Review of Systems   Constitutional: Negative. HENT: Negative. Eyes: Negative. Respiratory: Negative. Cardiovascular: Negative. Gastrointestinal: Negative. Negative for constipation and diarrhea. Endocrine: Negative. Genitourinary: Negative. Negative for frequency, menstrual problem and urgency. Musculoskeletal: Negative. Skin: Negative. Allergic/Immunologic: Negative. Neurological: Negative. Hematological: Negative. Psychiatric/Behavioral: Negative. All other systems reviewed and are negative. Physical Exam   Constitutional: She is oriented to person, place, and time. She appears well-developed and well-nourished. Neck: Normal range of motion. Neck supple. No thyroid mass and no thyromegaly present. Cardiovascular: Normal rate and regular rhythm. Pulmonary/Chest: Effort normal and breath sounds normal. Right breast exhibits no inverted nipple, no mass, no nipple discharge and no skin change. Left breast exhibits no inverted nipple, no mass, no nipple discharge and no skin change. Abdominal: Soft. She exhibits no mass. There is no tenderness. Genitourinary: Vagina normal and uterus normal. There is lesion on the right labia. Cervix exhibits no motion tenderness. Right adnexum displays no mass and no tenderness. Left adnexum displays no mass and no tenderness. Genitourinary Comments: Pap collected  Small <1cm sebaceous cysts noted in right labia majora  Cervix surgically absent  Uterus surgically absent  Vaginal cuff palpates smooth   Musculoskeletal: Normal range of motion.    Neurological: She is alert and oriented to person, place, and time.   Skin: Skin is warm and dry. Psychiatric: She has a normal mood and affect. Nursing note and vitals reviewed. Diagnosis Orders   1. Encounter for well woman exam with routine gynecological exam  PAP SMEAR    Human papillomavirus (HPV) DNA probe thin prep high risk   2. Screening for cervical cancer  PAP SMEAR    Human papillomavirus (HPV) DNA probe thin prep high risk   3. Muscle spasm     4. Tension-type headache, not intractable, unspecified chronicity pattern     5. Sebaceous cyst         MEDICATIONS:  Orders Placed This Encounter   Medications    SUMAtriptan (IMITREX) 50 MG tablet     Sig: Take 1 tablet by mouth once as needed for Migraine     Dispense:  9 tablet     Refill:  5    tiZANidine (ZANAFLEX) 4 MG tablet     Sig: TAKE (1) TABLET BY MOUTH EVERY SIX HOURS AS NEEDED FOR PAIN. Dispense:  60 tablet     Refill:  0       ORDERS:  Orders Placed This Encounter   Procedures    PAP SMEAR    Human papillomavirus (HPV) DNA probe thin prep high risk       PLAN:  Pap collected  Refilled meds per pt request     Patient Instructions   Patient Education        Well Visit, Ages 25 to 48: Care Instructions  Your Care Instructions    Physical exams can help you stay healthy. Your doctor has checked your overall health and may have suggested ways to take good care of yourself. He or she also may have recommended tests. At home, you can help prevent illness with healthy eating, regular exercise, and other steps. Follow-up care is a key part of your treatment and safety. Be sure to make and go to all appointments, and call your doctor if you are having problems. It's also a good idea to know your test results and keep a list of the medicines you take. How can you care for yourself at home? · Reach and stay at a healthy weight. This will lower your risk for many problems, such as obesity, diabetes, heart disease, and high blood pressure.   · Get at least 30 minutes of physical activity on most days

## 2019-01-07 LAB
HPV TYPE 16: NOT DETECTED
HPV TYPE 18: NOT DETECTED
INTERPRETATION: NORMAL
OTHER HIGH RISK HPV: NOT DETECTED
SOURCE: NORMAL

## 2019-03-04 ENCOUNTER — OFFICE VISIT (OUTPATIENT)
Dept: OBGYN | Age: 44
End: 2019-03-04
Payer: COMMERCIAL

## 2019-03-04 ENCOUNTER — TELEPHONE (OUTPATIENT)
Dept: OBGYN | Age: 44
End: 2019-03-04

## 2019-03-04 ENCOUNTER — HOSPITAL ENCOUNTER (OUTPATIENT)
Dept: WOMENS IMAGING | Age: 44
Discharge: HOME | End: 2019-03-04
Payer: COMMERCIAL

## 2019-03-04 VITALS
HEART RATE: 81 BPM | OXYGEN SATURATION: 96 % | BODY MASS INDEX: 30.25 KG/M2 | DIASTOLIC BLOOD PRESSURE: 80 MMHG | HEIGHT: 64 IN | WEIGHT: 177.2 LBS | SYSTOLIC BLOOD PRESSURE: 126 MMHG

## 2019-03-04 DIAGNOSIS — N63.0 BREAST LUMP IN FEMALE: Primary | ICD-10-CM

## 2019-03-04 DIAGNOSIS — N60.11 FIBROCYSTIC DISEASE OF RIGHT BREAST: Primary | ICD-10-CM

## 2019-03-04 DIAGNOSIS — N63.0 BREAST LUMP IN FEMALE: ICD-10-CM

## 2019-03-04 DIAGNOSIS — N64.4 MASTALGIA: ICD-10-CM

## 2019-03-04 PROCEDURE — 99213 OFFICE O/P EST LOW 20 MIN: CPT | Performed by: NURSE PRACTITIONER

## 2019-03-04 PROCEDURE — 77065 DX MAMMO INCL CAD UNI: CPT

## 2019-03-04 PROCEDURE — 76642 ULTRASOUND BREAST LIMITED: CPT

## 2019-03-04 ASSESSMENT — ENCOUNTER SYMPTOMS
RESPIRATORY NEGATIVE: 1
ALLERGIC/IMMUNOLOGIC NEGATIVE: 1
CONSTIPATION: 0
GASTROINTESTINAL NEGATIVE: 1
EYES NEGATIVE: 1
DIARRHEA: 0

## 2019-03-26 RX ORDER — TIZANIDINE 4 MG/1
TABLET ORAL
Qty: 60 TABLET | Refills: 0 | Status: SHIPPED | OUTPATIENT
Start: 2019-03-26 | End: 2019-12-31 | Stop reason: SDUPTHER

## 2019-04-19 ENCOUNTER — OFFICE VISIT (OUTPATIENT)
Dept: GASTROENTEROLOGY | Facility: CLINIC | Age: 44
End: 2019-04-19

## 2019-04-19 VITALS
WEIGHT: 175 LBS | BODY MASS INDEX: 29.88 KG/M2 | HEART RATE: 65 BPM | DIASTOLIC BLOOD PRESSURE: 82 MMHG | OXYGEN SATURATION: 99 % | HEIGHT: 64 IN | SYSTOLIC BLOOD PRESSURE: 122 MMHG | TEMPERATURE: 98.2 F

## 2019-04-19 DIAGNOSIS — K58.9 IRRITABLE BOWEL SYNDROME, UNSPECIFIED TYPE: Primary | ICD-10-CM

## 2019-04-19 PROCEDURE — 99213 OFFICE O/P EST LOW 20 MIN: CPT | Performed by: NURSE PRACTITIONER

## 2019-04-19 RX ORDER — SUMATRIPTAN 50 MG/1
50 TABLET, FILM COATED ORAL AS NEEDED
COMMUNITY
End: 2021-07-23 | Stop reason: SINTOL

## 2019-04-19 RX ORDER — DICYCLOMINE HYDROCHLORIDE 10 MG/1
10 CAPSULE ORAL EVERY 6 HOURS PRN
Qty: 60 CAPSULE | Refills: 11 | Status: SHIPPED | OUTPATIENT
Start: 2019-04-19 | End: 2020-05-28

## 2019-04-19 NOTE — PROGRESS NOTES
"[facility] GASTROENTEROLOGY - OFFICE NOTE    4/19/2019    Allison Wilkins   1975    Primary Physician: Rodney George MD    Chief Complaint   Patient presents with   • Irritable Bowel Syndrome          HISTORY OF PRESENT ILLNESS    Allison Wilkins is a 43 y.o. female presents  with IBS. Takes bentyl more frequently recently. Bowel habits alternate.   Typically has bm every day to every other day. Does strain a lot with bm. Has had lower abdominal cramping recently after eating that resolved after bm. Will have nausea with these episodes as well.  She uses Bentyl as needed and does help.  No rectal bleeding. No fever. No weight loss.     Does not take a fiber supplement.     Started taking  zanaflex for 6 months.       Last egd 12/2016 path negative for beth's.   Last colonoscopy 12/2013 recommend recall age 49.        Ov  12/2016  Allison Wilkins is a 41 y.o. female irritable bowel syndrome.  She has had symptoms for several years.  She can alternate from constipation to diarrhea.  She does have episodes of postprandial abdominal cramping followed by loose stools.  She does take Bentyl which does seem to help.  Her symptoms are \"unpredictable\".  No significant rectal bleeding currently.  She has lost some weight but has been mostly intentional.  She does need a refill of Bentyl.  No vomiting.  No fevers.  She is up-to-date on colonoscopy.     Beth's esophagus    She has history of Beth's esophagus.  Was diagnosed around 4 years ago.  She is taking over-the-counter omeprazole on a daily basis which seems to control her acid reflux symptoms she denies dysphagia or odynophagia.  Denies hematemesis.  His lost some weight but has been intentional.  Has occasional nausea but no vomiting.            Past Medical History:   Diagnosis Date   • Beth syndrome    • Disease of thyroid gland    • Dysphagia    • Eosinophilic esophagitis    • GERD (gastroesophageal reflux disease)    • Heavy periods     with pelvic pain "   • Hemorrhoids    • Hypertension    • IBS (irritable bowel syndrome)    • PONV (postoperative nausea and vomiting)    • Rectal bleeding        Past Surgical History:   Procedure Laterality Date   • CHOLECYSTECTOMY     • DILATION AND CURETTAGE, DIAGNOSTIC / THERAPEUTIC     • ENDOSCOPY N/A 12/29/2016    Procedure: ESOPHAGOGASTRODUODENOSCOPY WITH ANESTHESIA;  Surgeon: Theodore Traylor MD;  Location: [facility] ENDOSCOPY;  Service:    • ENDOSCOPY AND COLONOSCOPY  12/04/2013   • TOTAL LAPAROSCOPIC HYSTERECTOMY N/A 12/21/2017    Procedure: TOTAL LAPAROSCOPIC HYSTERECTOMY WITH DAVINCI SI ROBOT;  Surgeon: Joyce Mcfadden MD;  Location: [facility] OR;  Service:    • TUBAL ABDOMINAL LIGATION         Outpatient Medications Marked as Taking for the 4/19/19 encounter (Office Visit) with Tashia Bahena APRN   Medication Sig Dispense Refill   • cetirizine (zyrTEC) 10 MG tablet Take 10 mg by mouth daily.     • dicyclomine (BENTYL) 10 MG capsule Take 1 capsule by mouth Every 6 (Six) Hours As Needed (post prandrial abd cramping,  with diarrhea). 60 capsule 11   • levothyroxine (SYNTHROID, LEVOTHROID) 175 MCG tablet Take 175 mcg by mouth daily.     • lisinopril (PRINIVIL,ZESTRIL) 10 MG tablet Take 10 mg by mouth Every 14 (Fourteen) Days.     • omeprazole (priLOSEC) 20 MG capsule bid prn     • ondansetron (ZOFRAN) 8 MG tablet Take 4 mg by mouth Every 8 (Eight) Hours As Needed for Nausea.     • promethazine (PHENERGAN) 25 MG tablet 25 mg Every 6 (Six) Hours As Needed for Nausea or Vomiting.     • SUMAtriptan (IMITREX) 50 MG tablet Take 50 mg by mouth As Needed for Migraine. Take one tablet at onset of headache. May repeat dose one time in 2 hours if headache not relieved.     • tiZANidine (ZANAFLEX) 4 MG tablet 4 mg Daily.     • [DISCONTINUED] dicyclomine (BENTYL) 10 MG capsule Take 1 capsule by mouth Every 6 (Six) Hours As Needed (post prandrial abd cramping,  with diarrhea). 60 capsule 11       No Known Allergies    Social History  "    Socioeconomic History   • Marital status:      Spouse name: Not on file   • Number of children: Not on file   • Years of education: Not on file   • Highest education level: Not on file   Tobacco Use   • Smoking status: Never Smoker   • Smokeless tobacco: Never Used   Substance and Sexual Activity   • Alcohol use: Yes     Comment: just occassional every 3-4 months   • Drug use: No   • Sexual activity: Defer       Family History   Problem Relation Age of Onset   • Diverticulitis Mother    • Colon polyps Maternal Grandmother    • Colon cancer Neg Hx        Review of Systems   Constitutional: Negative for chills, fever and unexpected weight change.   Respiratory: Negative for cough, shortness of breath and wheezing.    Cardiovascular: Negative for chest pain and palpitations.   Gastrointestinal: Positive for constipation and nausea. Negative for abdominal distention, abdominal pain, anal bleeding, blood in stool, diarrhea and vomiting.        Vitals:    04/19/19 1019   BP: 122/82   Pulse: 65   Temp: 98.2 °F (36.8 °C)   SpO2: 99%   Weight: 79.4 kg (175 lb)   Height: 162.6 cm (64\")      Body mass index is 30.04 kg/m².    Physical Exam   Constitutional: She appears well-developed and well-nourished. No distress.   Cardiovascular: Normal rate, regular rhythm and normal heart sounds.   Pulmonary/Chest: Effort normal and breath sounds normal.   Abdominal: Soft. Bowel sounds are normal. She exhibits no distension. There is no tenderness.   Musculoskeletal: She exhibits no edema.   Neurological: She is alert.   Skin: Skin is warm and dry.   Psychiatric: She has a normal mood and affect. Her behavior is normal.   Vitals reviewed.              ASSESSMENT AND PLAN    Assessment/Plan     Allison was seen today for irritable bowel syndrome.    Diagnoses and all orders for this visit:    Irritable bowel syndrome, unspecified type    Other orders  -     dicyclomine (BENTYL) 10 MG capsule; Take 1 capsule by mouth Every 6 (Six) " Hours As Needed (post prandrial abd cramping,  with diarrhea).    She needs a refill of Bentyl.  She does still have intermittent episodes of postprandial lower abdominal cramping with diarrhea.  She has been having some alternating constipation as well.  She started on Zanaflex around 6 months ago  And that can cause constipation.  To start on a fiber supplement daily.  I have also recommended taking a probiotic daily.  Of note she stated in the past has tried align and that did help her abdominal complaints.  Also recommend trial of a FODMAP diet.  Refill her Bentyl.  We will see her back in the office in 1 year or sooner if any problems       Body mass index is 30.04 kg/m².    Patient's Body mass index is 30.04 kg/m². BMI is above normal parameters. Recommendations include: no follow up, recommend weight loss.       Return in about 1 year (around 4/19/2020).        DEEPTI Pacheco Dragon/transcription disclaimer:  Much of this encounter note is electronic transcription/translation of spoken language to printed text.  The electronic translation of spoken language may be erroneous, or at times, nonsensical words or phrases may be inadvertently transcribed.  Although I have reviewed the note for such errors, some may still exist.

## 2019-09-05 ENCOUNTER — TRANSCRIBE ORDERS (OUTPATIENT)
Dept: ADMINISTRATIVE | Facility: HOSPITAL | Age: 44
End: 2019-09-05

## 2019-09-05 DIAGNOSIS — R10.32 ABDOMINAL PAIN, LEFT LOWER QUADRANT: Primary | ICD-10-CM

## 2019-09-06 ENCOUNTER — HOSPITAL ENCOUNTER (OUTPATIENT)
Dept: CT IMAGING | Facility: HOSPITAL | Age: 44
Discharge: HOME | End: 2019-09-06
Admitting: PHYSICIAN ASSISTANT

## 2019-09-06 DIAGNOSIS — R10.32 ABDOMINAL PAIN, LEFT LOWER QUADRANT: ICD-10-CM

## 2019-09-06 LAB — CREAT BLDA-MCNC: 0.5 MG/DL (ref 0.6–1.3)

## 2019-09-06 PROCEDURE — 25010000002 IOPAMIDOL 61 % SOLUTION: Performed by: PHYSICIAN ASSISTANT

## 2019-09-06 PROCEDURE — 82565 ASSAY OF CREATININE: CPT

## 2019-09-06 PROCEDURE — 74177 CT ABD & PELVIS W/CONTRAST: CPT

## 2019-09-06 PROCEDURE — 0 IOHEXOL 300 MG/ML SOLUTION: Performed by: PHYSICIAN ASSISTANT

## 2019-09-06 RX ADMIN — IOHEXOL 50 ML: 300 INJECTION, SOLUTION INTRAVENOUS at 08:40

## 2019-09-06 RX ADMIN — IOPAMIDOL 100 ML: 612 INJECTION, SOLUTION INTRAVENOUS at 08:40

## 2019-09-09 ENCOUNTER — TELEPHONE (OUTPATIENT)
Dept: OBGYN | Age: 44
End: 2019-09-09

## 2019-09-12 ENCOUNTER — OFFICE VISIT (OUTPATIENT)
Dept: OBGYN | Age: 44
End: 2019-09-12
Payer: COMMERCIAL

## 2019-09-12 VITALS
DIASTOLIC BLOOD PRESSURE: 76 MMHG | BODY MASS INDEX: 30.05 KG/M2 | WEIGHT: 176 LBS | SYSTOLIC BLOOD PRESSURE: 122 MMHG | HEIGHT: 64 IN

## 2019-09-12 DIAGNOSIS — R10.2 PELVIC PAIN: Primary | ICD-10-CM

## 2019-09-12 PROCEDURE — 99213 OFFICE O/P EST LOW 20 MIN: CPT | Performed by: ADVANCED PRACTICE MIDWIFE

## 2019-09-12 NOTE — PROGRESS NOTES
encounter. ORDERS:  Orders Placed This Encounter   Procedures    US Non OB Transvaginal       PLAN:  1. Ovarian cyst/pelvic & abd pain - I d/w Tina different etiologies for her pain. The results of her  CT lead me to believe it is not her GYN system. She has normal follicles on her ovaries. I have ordered an u/s for a more detailed report. Reassurance was given.  If her u/s is unremarkable, I suggested she f/u with GI.

## 2019-09-13 ASSESSMENT — ENCOUNTER SYMPTOMS
DIARRHEA: 0
RECTAL PAIN: 0
VOMITING: 0
CONSTIPATION: 0
RESPIRATORY NEGATIVE: 1
ALLERGIC/IMMUNOLOGIC NEGATIVE: 1
EYES NEGATIVE: 1
ABDOMINAL PAIN: 1
NAUSEA: 1

## 2019-09-20 ENCOUNTER — HOSPITAL ENCOUNTER (OUTPATIENT)
Dept: ULTRASOUND IMAGING | Age: 44
Discharge: HOME | End: 2019-09-20
Payer: COMMERCIAL

## 2019-09-20 DIAGNOSIS — R10.2 PELVIC PAIN: ICD-10-CM

## 2019-09-20 PROCEDURE — 76830 TRANSVAGINAL US NON-OB: CPT

## 2019-09-23 ENCOUNTER — PATIENT MESSAGE (OUTPATIENT)
Dept: OBGYN | Age: 44
End: 2019-09-23

## 2019-09-23 DIAGNOSIS — R10.2 PELVIC PAIN: Primary | ICD-10-CM

## 2019-11-04 ENCOUNTER — OFFICE VISIT (OUTPATIENT)
Dept: GASTROENTEROLOGY | Facility: CLINIC | Age: 44
End: 2019-11-04

## 2019-11-04 VITALS
OXYGEN SATURATION: 98 % | BODY MASS INDEX: 30.73 KG/M2 | WEIGHT: 180 LBS | SYSTOLIC BLOOD PRESSURE: 138 MMHG | DIASTOLIC BLOOD PRESSURE: 82 MMHG | TEMPERATURE: 97.8 F | HEIGHT: 64 IN | HEART RATE: 55 BPM

## 2019-11-04 DIAGNOSIS — R10.13 EPIGASTRIC PAIN: Primary | ICD-10-CM

## 2019-11-04 PROCEDURE — 99214 OFFICE O/P EST MOD 30 MIN: CPT | Performed by: NURSE PRACTITIONER

## 2019-11-04 RX ORDER — PANTOPRAZOLE SODIUM 40 MG/1
40 TABLET, DELAYED RELEASE ORAL DAILY
COMMUNITY

## 2019-11-04 RX ORDER — SUCRALFATE 1 G/1
1 TABLET ORAL DAILY PRN
COMMUNITY

## 2019-11-04 NOTE — PROGRESS NOTES
"[facility] GASTROENTEROLOGY - OFFICE NOTE    11/4/2019    Allison Wilkins   1975    Primary Physician: Rodney George MD    Chief Complaint   Patient presents with   • Abdominal Pain         HISTORY OF PRESENT ILLNESS:     Allison Wilkins is a 44 y.o. female presents  with yulia pain started 1 mo ago. This was intermittent. Described as a \" hurt.and bloating\".  Was on omeprazole daily for years.  Switched to pantoprazole and better. Did note some pain after drinking some etoh one night. Has had nausea. Has been taking ibuprofen 2-3 days per week for headache.  Has been under some stress and does have history of irritable bowel syndrome.  She takes Bentyl as needed.  No history pud.  No vomiting. No black stool. No fever. No heartburn. No dysphagia.         ==========================================================================  Ov  4-19-19  Allison Wilkins is a 43 y.o. female presents  with IBS. Takes bentyl more frequently recently. Bowel habits alternate. Typically has bm every day to every other day. Does strain a lot with bm. Has had lower abdominal cramping recently after eating that resolved after bm. Will have nausea with these episodes as well.  She uses Bentyl as needed and does help.  No rectal bleeding. No fever. No weight loss.      Does not take a fiber supplement.      Started taking  zanaflex for 6 months.         Last egd 12/2016 path negative for beth's.   Last colonoscopy 12/2013 recommend recall age 49.          Ov  12/2016  Allison Wilkins is a 41 y.o. female irritable bowel syndrome.  She has had symptoms for several years.  She can alternate from constipation to diarrhea.  She does have episodes of postprandial abdominal cramping followed by loose stools.  She does take Bentyl which does seem to help.  Her symptoms are \"unpredictable\".  No significant rectal bleeding currently.  She has lost some weight but has been mostly intentional.  She does need a refill of Bentyl.  No vomiting.  No fevers. "  She is up-to-date on colonoscopy.     Stewart's esophagus    She has history of Stewart's esophagus.  Was diagnosed around 4 years ago.  She is taking over-the-counter omeprazole on a daily basis which seems to control her acid reflux symptoms she denies dysphagia or odynophagia.  Denies hematemesis.  His lost some weight but has been intentional.  Has occasional nausea but no vomiting.     Past Medical History:   Diagnosis Date   • Stewart syndrome    • Disease of thyroid gland    • Dysphagia    • Eosinophilic esophagitis    • GERD (gastroesophageal reflux disease)    • Heavy periods     with pelvic pain   • Hemorrhoids    • Hypertension    • IBS (irritable bowel syndrome)    • PONV (postoperative nausea and vomiting)    • Rectal bleeding        Past Surgical History:   Procedure Laterality Date   • CHOLECYSTECTOMY     • DILATION AND CURETTAGE, DIAGNOSTIC / THERAPEUTIC     • ENDOSCOPY N/A 12/29/2016    Procedure: ESOPHAGOGASTRODUODENOSCOPY WITH ANESTHESIA;  Surgeon: Theodore Traylor MD;  Location: [facility] ENDOSCOPY;  Service:    • ENDOSCOPY AND COLONOSCOPY  12/04/2013   • TOTAL LAPAROSCOPIC HYSTERECTOMY N/A 12/21/2017    Procedure: TOTAL LAPAROSCOPIC HYSTERECTOMY WITH DAVINCI SI ROBOT;  Surgeon: Joyce Mcfadden MD;  Location: [facility] OR;  Service:    • TUBAL ABDOMINAL LIGATION         Outpatient Medications Marked as Taking for the 11/4/19 encounter (Office Visit) with Tashia Bahena APRN   Medication Sig Dispense Refill   • cetirizine (zyrTEC) 10 MG tablet Take 10 mg by mouth daily.     • dicyclomine (BENTYL) 10 MG capsule Take 1 capsule by mouth Every 6 (Six) Hours As Needed (post prandrial abd cramping,  with diarrhea). 60 capsule 11   • levothyroxine (SYNTHROID, LEVOTHROID) 175 MCG tablet Take 175 mcg by mouth daily.     • omeprazole (priLOSEC) 20 MG capsule prn     • ondansetron (ZOFRAN) 8 MG tablet Take 4 mg by mouth Every 8 (Eight) Hours As Needed for Nausea.     • pantoprazole (PROTONIX) 40 MG EC  "tablet Take 40 mg by mouth Daily.     • promethazine (PHENERGAN) 25 MG tablet 25 mg Every 6 (Six) Hours As Needed for Nausea or Vomiting.     • sucralfate (CARAFATE) 1 g tablet Take 1 g by mouth Daily As Needed.     • SUMAtriptan (IMITREX) 50 MG tablet Take 50 mg by mouth As Needed for Migraine. Take one tablet at onset of headache. May repeat dose one time in 2 hours if headache not relieved.     • tiZANidine (ZANAFLEX) 4 MG tablet 4 mg Daily.         No Known Allergies    Social History     Socioeconomic History   • Marital status:      Spouse name: Not on file   • Number of children: Not on file   • Years of education: Not on file   • Highest education level: Not on file   Tobacco Use   • Smoking status: Never Smoker   • Smokeless tobacco: Never Used   Substance and Sexual Activity   • Alcohol use: Yes     Comment: just occassional every 3-4 months   • Drug use: No   • Sexual activity: Defer       Family History   Problem Relation Age of Onset   • Diverticulitis Mother    • Colon polyps Maternal Grandmother    • Colon cancer Neg Hx        Review of Systems   Constitutional: Negative for chills, fever and unexpected weight change.   Respiratory: Negative for cough, shortness of breath and wheezing.    Cardiovascular: Negative for chest pain and palpitations.   Gastrointestinal: Positive for abdominal pain and nausea. Negative for abdominal distention, anal bleeding, blood in stool, constipation, diarrhea and vomiting.        Vitals:    11/04/19 0817   BP: 138/82   Pulse: 55   Temp: 97.8 °F (36.6 °C)   SpO2: 98%   Weight: 81.6 kg (180 lb)   Height: 162.6 cm (64\")      Body mass index is 30.9 kg/m².    Physical Exam   Constitutional: No distress.   Cardiovascular: Normal rate, regular rhythm and normal heart sounds.   Pulmonary/Chest: Effort normal and breath sounds normal.   Abdominal: Soft. Bowel sounds are normal. She exhibits no distension. There is tenderness (mild tenderness yulia ).   Musculoskeletal: " She exhibits no edema.   Neurological: She is alert.   Skin: Skin is warm and dry.   Vitals reviewed.      Results for orders placed or performed during the hospital encounter of 09/06/19   POC Creatinine   Result Value Ref Range    Creatinine 0.50 (L) 0.60 - 1.30 mg/dL           ASSESSMENT AND PLAN    Assessment/Plan     Allison was seen today for abdominal pain.    Diagnoses and all orders for this visit:    Epigastric pain  -     Cancel: Case Request; Standing  -     Cancel: Case Request  -     Case Request; Standing  -     Case Request    Other orders  -     Cancel: Follow Anesthesia Guidelines / Standing Orders; Future  -     Cancel: Implement Anesthesia Orders Day of Procedure; Standing  -     Cancel: Obtain Informed Consent; Standing  -     Cancel: Pacemaker Interrogation; Future  -     Cancel: Obtain Informed Consent; Future  -     Follow Anesthesia Guidelines / Standing Orders; Future  -     Implement Anesthesia Orders Day of Procedure; Standing  -     Obtain Informed Consent; Standing  -     Obtain Informed Consent; Future      In regards to yulia pain, differential diagnosis discussed.  She has been taking ibuprofen for headaches.  I recommend that she stop taking NSAIDs and not to take any aspirin for now.  She may use Tylenol for headaches.  I recommend that she start taking pantoprazole on a daily basis.  May use omeprazole in the evening if needed for breakthrough reflux.  Discussed that she might want to try Bentyl as well for bloating.  I do recommend upper endoscopy for further evaluation and she is agreeable.    ESOPHAGOGASTRODUODENOSCOPY WITH ANESTHESIA (N/A)   Risk, benefits, and alternatives of endoscopy were explained in full.  They understand that there is a risk of bleeding, perforation, and infection.  The risk of perforation goes up with esophageal dilation.  Other options to evaluate UGI complaints could involve barium swallow or UGI series, but these would be diagnostic tests only.  Patient  was given time to ask questions.  I answered them to their satisfaction and they are agreeable to proceeding         Body mass index is 30.9 kg/m².    Patient's Body mass index is 30.9 kg/m². BMI is above normal parameters. Recommendations include: no follow up , recommend weight loss .       DEEPTI Pacheco    EMR Dragon/transcription disclaimer:  Much of this encounter note is electronic transcription/translation of spoken language to printed text.  The electronic translation of spoken language may be erroneous, or at times, nonsensical words or phrases may be inadvertently transcribed.  Although I have reviewed the note for such errors, some may still exist.

## 2019-11-08 ENCOUNTER — ANESTHESIA EVENT (OUTPATIENT)
Dept: GASTROENTEROLOGY | Facility: HOSPITAL | Age: 44
End: 2019-11-08

## 2019-11-08 ENCOUNTER — HOSPITAL ENCOUNTER (OUTPATIENT)
Facility: HOSPITAL | Age: 44
Setting detail: HOSPITAL OUTPATIENT SURGERY
Discharge: HOME | End: 2019-11-08
Attending: INTERNAL MEDICINE | Admitting: INTERNAL MEDICINE

## 2019-11-08 ENCOUNTER — ANESTHESIA (OUTPATIENT)
Dept: GASTROENTEROLOGY | Facility: HOSPITAL | Age: 44
End: 2019-11-08

## 2019-11-08 VITALS
HEART RATE: 55 BPM | DIASTOLIC BLOOD PRESSURE: 81 MMHG | OXYGEN SATURATION: 99 % | RESPIRATION RATE: 18 BRPM | WEIGHT: 180 LBS | SYSTOLIC BLOOD PRESSURE: 118 MMHG | TEMPERATURE: 98.1 F | HEIGHT: 64 IN | BODY MASS INDEX: 30.73 KG/M2

## 2019-11-08 DIAGNOSIS — R10.13 EPIGASTRIC PAIN: ICD-10-CM

## 2019-11-08 PROCEDURE — 43239 EGD BIOPSY SINGLE/MULTIPLE: CPT | Performed by: INTERNAL MEDICINE

## 2019-11-08 PROCEDURE — 87081 CULTURE SCREEN ONLY: CPT | Performed by: INTERNAL MEDICINE

## 2019-11-08 PROCEDURE — 25010000002 PROPOFOL 10 MG/ML EMULSION: Performed by: NURSE ANESTHETIST, CERTIFIED REGISTERED

## 2019-11-08 RX ORDER — SODIUM CHLORIDE 9 MG/ML
100 INJECTION, SOLUTION INTRAVENOUS CONTINUOUS
Status: DISCONTINUED | OUTPATIENT
Start: 2019-11-08 | End: 2019-11-08 | Stop reason: HOSPADM

## 2019-11-08 RX ORDER — ONDANSETRON 2 MG/ML
4 INJECTION INTRAMUSCULAR; INTRAVENOUS ONCE AS NEEDED
Status: DISCONTINUED | OUTPATIENT
Start: 2019-11-08 | End: 2019-11-08 | Stop reason: HOSPADM

## 2019-11-08 RX ORDER — SODIUM CHLORIDE 0.9 % (FLUSH) 0.9 %
3-10 SYRINGE (ML) INJECTION AS NEEDED
Status: DISCONTINUED | OUTPATIENT
Start: 2019-11-08 | End: 2019-11-08 | Stop reason: HOSPADM

## 2019-11-08 RX ORDER — SODIUM CHLORIDE 0.9 % (FLUSH) 0.9 %
3 SYRINGE (ML) INJECTION EVERY 12 HOURS SCHEDULED
Status: DISCONTINUED | OUTPATIENT
Start: 2019-11-08 | End: 2019-11-08 | Stop reason: HOSPADM

## 2019-11-08 RX ORDER — PROPOFOL 10 MG/ML
VIAL (ML) INTRAVENOUS AS NEEDED
Status: DISCONTINUED | OUTPATIENT
Start: 2019-11-08 | End: 2019-11-08 | Stop reason: SURG

## 2019-11-08 RX ADMIN — SODIUM CHLORIDE 100 ML/HR: 9 INJECTION, SOLUTION INTRAVENOUS at 08:49

## 2019-11-08 RX ADMIN — PROPOFOL 50 MG: 10 INJECTION, EMULSION INTRAVENOUS at 09:59

## 2019-11-08 RX ADMIN — LIDOCAINE HYDROCHLORIDE 60 MG: 20 INJECTION, SOLUTION INTRAVENOUS at 09:59

## 2019-11-08 RX ADMIN — PROPOFOL 50 MG: 10 INJECTION, EMULSION INTRAVENOUS at 10:05

## 2019-11-08 RX ADMIN — PROPOFOL 50 MG: 10 INJECTION, EMULSION INTRAVENOUS at 10:01

## 2019-11-08 RX ADMIN — PROPOFOL 50 MG: 10 INJECTION, EMULSION INTRAVENOUS at 10:03

## 2019-11-08 NOTE — ANESTHESIA POSTPROCEDURE EVALUATION
Patient: Allison Wilkins    Procedure Summary     Date:  11/08/19 Room / Location:  [facility] ENDOSCOPY 5 / [facility] ENDOSCOPY    Anesthesia Start:  0954 Anesthesia Stop:  1010    Procedure:  ESOPHAGOGASTRODUODENOSCOPY WITH ANESTHESIA (N/A ) Diagnosis:       Epigastric pain      (Epigastric pain [R10.13])    Surgeon:  Theodore Traylor MD Provider:  Scar De Oliveira CRNA    Anesthesia Type:  MAC ASA Status:  2          Anesthesia Type: MAC  Last vitals  BP   118/81 (11/08/19 1025)   Temp   98.1 °F (36.7 °C) (11/08/19 0833)   Pulse   55 (11/08/19 1025)   Resp   18 (11/08/19 1025)     SpO2   99 % (11/08/19 1025)     Post Anesthesia Care and Evaluation    Patient location during evaluation: PHASE II  Patient participation: complete - patient participated  Level of consciousness: awake and sleepy but conscious  Pain score: 0  Pain management: adequate  Airway patency: patent  Anesthetic complications: No anesthetic complications    Cardiovascular status: acceptable  Respiratory status: acceptable  Hydration status: acceptable

## 2019-11-08 NOTE — ANESTHESIA PREPROCEDURE EVALUATION
Anesthesia Evaluation     history of anesthetic complications: PONV  NPO Solid Status: > 8 hours  NPO Liquid Status: > 8 hours           Airway   Mallampati: II  TM distance: <3 FB  Neck ROM: full  Dental - normal exam     Pulmonary - normal exam    breath sounds clear to auscultation  (-) asthma, recent URI, sleep apnea, not a smoker  Cardiovascular - normal exam  Exercise tolerance: good (4-7 METS)    Rhythm: regular  Rate: normal    (+) hypertension (no longer on any medications) well controlled,   (-) pacemaker, past MI, angina, cardiac stents, CABG      Neuro/Psych  (-) seizures, TIA, CVA  GI/Hepatic/Renal/Endo    (+) obesity,  GERD,  thyroid problem hypothyroidism  (-) liver disease, no renal disease, diabetes    Musculoskeletal     Abdominal    Substance History      OB/GYN          Other                        Anesthesia Plan    ASA 2     MAC     intravenous induction     Anesthetic plan, all risks, benefits, and alternatives have been provided, discussed and informed consent has been obtained with: patient.

## 2019-11-09 LAB — UREASE TISS QL: NEGATIVE

## 2019-12-31 ENCOUNTER — OFFICE VISIT (OUTPATIENT)
Dept: OBGYN | Age: 44
End: 2019-12-31
Payer: COMMERCIAL

## 2019-12-31 VITALS
WEIGHT: 184 LBS | DIASTOLIC BLOOD PRESSURE: 89 MMHG | BODY MASS INDEX: 31.41 KG/M2 | HEIGHT: 64 IN | HEART RATE: 68 BPM | SYSTOLIC BLOOD PRESSURE: 132 MMHG

## 2019-12-31 DIAGNOSIS — Z01.419 WOMEN'S ANNUAL ROUTINE GYNECOLOGICAL EXAMINATION: Primary | ICD-10-CM

## 2019-12-31 DIAGNOSIS — Z12.72 SCREENING FOR VAGINAL CANCER: ICD-10-CM

## 2019-12-31 DIAGNOSIS — Z12.39 SCREENING FOR BREAST CANCER: ICD-10-CM

## 2019-12-31 PROCEDURE — 99396 PREV VISIT EST AGE 40-64: CPT | Performed by: NURSE PRACTITIONER

## 2019-12-31 RX ORDER — SUMATRIPTAN 50 MG/1
50 TABLET, FILM COATED ORAL
Qty: 9 TABLET | Refills: 5 | Status: SHIPPED | OUTPATIENT
Start: 2019-12-31 | End: 2022-06-03

## 2019-12-31 RX ORDER — TIZANIDINE 4 MG/1
TABLET ORAL
Qty: 60 TABLET | Refills: 0 | Status: SHIPPED | OUTPATIENT
Start: 2019-12-31

## 2020-01-09 LAB
HPV COMMENT: NORMAL
HPV TYPE 16: NOT DETECTED
HPV TYPE 18: NOT DETECTED
HPVOH (OTHER TYPES): NOT DETECTED

## 2020-05-28 RX ORDER — DICYCLOMINE HYDROCHLORIDE 10 MG/1
CAPSULE ORAL
Qty: 60 CAPSULE | Refills: 5 | Status: SHIPPED | OUTPATIENT
Start: 2020-05-28 | End: 2021-12-09 | Stop reason: SDUPTHER

## 2020-06-26 ENCOUNTER — HOSPITAL ENCOUNTER (OUTPATIENT)
Dept: WOMENS IMAGING | Age: 45
Discharge: HOME | End: 2020-06-26
Payer: COMMERCIAL

## 2020-06-26 PROCEDURE — 77063 BREAST TOMOSYNTHESIS BI: CPT

## 2021-01-08 ENCOUNTER — OFFICE VISIT (OUTPATIENT)
Dept: OBGYN CLINIC | Age: 46
End: 2021-01-08
Payer: COMMERCIAL

## 2021-01-08 VITALS
DIASTOLIC BLOOD PRESSURE: 85 MMHG | WEIGHT: 191 LBS | HEIGHT: 64 IN | SYSTOLIC BLOOD PRESSURE: 136 MMHG | HEART RATE: 70 BPM | BODY MASS INDEX: 32.61 KG/M2

## 2021-01-08 DIAGNOSIS — Z01.419 WOMEN'S ANNUAL ROUTINE GYNECOLOGICAL EXAMINATION: Primary | ICD-10-CM

## 2021-01-08 DIAGNOSIS — N60.12 FIBROCYSTIC BREAST CHANGES, LEFT: ICD-10-CM

## 2021-01-08 DIAGNOSIS — R60.9 FLUID RETENTION: ICD-10-CM

## 2021-01-08 DIAGNOSIS — Z12.31 ENCOUNTER FOR SCREENING MAMMOGRAM FOR MALIGNANT NEOPLASM OF BREAST: ICD-10-CM

## 2021-01-08 DIAGNOSIS — Z12.72 SCREENING FOR VAGINAL CANCER: ICD-10-CM

## 2021-01-08 DIAGNOSIS — R63.5 WEIGHT GAIN: ICD-10-CM

## 2021-01-08 PROCEDURE — 99396 PREV VISIT EST AGE 40-64: CPT | Performed by: NURSE PRACTITIONER

## 2021-01-08 RX ORDER — HYDROCHLOROTHIAZIDE 25 MG/1
25 TABLET ORAL EVERY MORNING
Qty: 30 TABLET | Refills: 11 | Status: SHIPPED | OUTPATIENT
Start: 2021-01-08 | End: 2021-11-19

## 2021-01-08 ASSESSMENT — ENCOUNTER SYMPTOMS
CHEST TIGHTNESS: 1
DIARRHEA: 0
CONSTIPATION: 0
GASTROINTESTINAL NEGATIVE: 1
ALLERGIC/IMMUNOLOGIC NEGATIVE: 1
EYES NEGATIVE: 1

## 2021-01-08 NOTE — PATIENT INSTRUCTIONS
Patient Education        Breast Self-Exam: Care Instructions  Your Care Instructions     A breast self-exam is when you check your breasts for lumps or changes. This regular exam helps you learn how your breasts normally look and feel. Most breast problems or changes are not because of cancer. Breast self-exam is not a substitute for a mammogram. Having regular breast exams by your doctor and regular mammograms improve your chances of finding any problems with your breasts. Some women set a time each month to do a step-by-step breast self-exam. Other women like a less formal system. They might look at their breasts as they brush their teeth, or feel their breasts once in a while in the shower. If you notice a change in your breast, tell your doctor. Follow-up care is a key part of your treatment and safety. Be sure to make and go to all appointments, and call your doctor if you are having problems. It's also a good idea to know your test results and keep a list of the medicines you take. How do you do a breast self-exam?  · The best time to examine your breasts is usually one week after your menstrual period begins. Your breasts should not be tender then. If you do not have periods, you might do your exam on a day of the month that is easy to remember. · To examine your breasts:  ? Remove all your clothes above the waist and lie down. When you are lying down, your breast tissue spreads evenly over your chest wall, which makes it easier to feel all your breast tissue. ? Use the padsnot the fingertipsof the 3 middle fingers of your left hand to check your right breast. Move your fingers slowly in small coin-sized circles that overlap.

## 2021-01-08 NOTE — PROGRESS NOTES
Kendal Brown is a 54 y.o. female who presents today for her medical conditions/ complaints as noted below. Kendal Brown is c/o of Gynecologic Exam        HPI   Pt presents for annual exam and pap smear. Current with screening mammogram. Dr Ant Washington PCP and sees endo. Current with labs and medications. Has noticed more fluid retention in hands, feet and face. Her heart rate has been higher than normal, in the 120's per her watch. Has history of HTN, currently not taking medication. Has been drinking more caffeine, gained a little weight. Feels like this could be contributing  Factor. Feeling a small lump in the lower fold of her left breast. Started doing David and wearing a sports bra. Drinking more caffeine than normal. Noticed this about a week ago. Mammo:2020  Pap [id number]  Contraception:Hysterectomy     P:2  Ab:0  Bone density:NA  Colonoscopy:  Patient's last menstrual period was 10/16/2017 (approximate).   [id number]    Past Medical History:   Diagnosis Date    Abnormal glandular Papanicolaou smear of cervix     ASCUS     Acid reflux     Fibroid     Hypothyroidism     Irritable bowel syndrome     Thyroid disease     Hypothyroid     Past Surgical History:   Procedure Laterality Date    CHOLECYSTECTOMY      COLONOSCOPY  ,     DILATION AND CURETTAGE OF UTERUS  10.21.2015    ENDOMETRIAL ABLATION  [id number]    HYSTERECTOMY, VAGINAL      TLH retained ovaries    TUBAL LIGATION      UPPER GASTROINTESTINAL ENDOSCOPY       Family History   Problem Relation Age of Onset    Other Father         Carotid blocked 2010    Thyroid Disease Mother     Heart Disease Brother 28        MI    Stroke Paternal Grandmother     Breast Cancer Neg Hx     Colon Cancer Neg Hx     Ovarian Cancer Neg Hx      Social History     Tobacco Use    Smoking status: Never Smoker    Smokeless tobacco: Never Used   Substance Use Topics    Alcohol use: No       Current Outpatient Medications Medication Sig Dispense Refill    hydroCHLOROthiazide (HYDRODIURIL) 25 MG tablet Take 1 tablet by mouth every morning 30 tablet 11    tiZANidine (ZANAFLEX) 4 MG tablet TAKE (1) TABLET BY MOUTH EVERY SIX HOURS AS NEEDED FOR PAIN. 60 tablet 0    Pantoprazole Sodium (PROTONIX PO) Take by mouth      levothyroxine (SYNTHROID) 175 MCG tablet Take 175 mcg by mouth daily.  cetirizine (ZYRTEC) 10 MG tablet Take 10 mg by mouth daily.  SUMAtriptan (IMITREX) 50 MG tablet Take 1 tablet by mouth once as needed for Migraine 9 tablet 5     No current facility-administered medications for this visit. No Known Allergies  Vitals:    01/08/21 1305   BP: 136/85   Pulse: 70     Body mass index is 32.79 kg/m². Review of Systems   Constitutional: Negative. HENT: Negative. Eyes: Negative. Respiratory: Positive for chest tightness. Cardiovascular: Positive for leg swelling. Gastrointestinal: Negative. Negative for constipation and diarrhea. Endocrine: Negative. Genitourinary: Negative. Negative for frequency, menstrual problem and urgency. Musculoskeletal: Negative. Skin: Negative. Allergic/Immunologic: Negative. Neurological: Positive for headaches. Hematological: Negative. Psychiatric/Behavioral: Negative. All other systems reviewed and are negative. Physical Exam  Vitals signs and nursing note reviewed. Constitutional:       Appearance: She is well-developed. HENT:      Head: Normocephalic. Neck:      Musculoskeletal: Normal range of motion and neck supple. Thyroid: No thyroid mass or thyromegaly. Cardiovascular:      Rate and Rhythm: Normal rate and regular rhythm. Pulmonary:      Effort: Pulmonary effort is normal.      Breath sounds: Normal breath sounds. Chest:      Breasts:         Right: No inverted nipple, mass, nipple discharge or skin change. Left: No inverted nipple, mass, nipple discharge or skin change.    Abdominal: Palpations: Abdomen is soft. There is no mass. Tenderness: There is no abdominal tenderness. Genitourinary:     General: Normal vulva. Vagina: Normal.      Uterus: Absent. Adnexa:         Right: No mass or tenderness. Left: No mass or tenderness. Comments: Pap collected  Cervix and uterus surgically absent  Vaginal cuff palpates smooth  Musculoskeletal: Normal range of motion. Skin:     General: Skin is warm and dry. Neurological:      Mental Status: She is alert and oriented to person, place, and time. Psychiatric:         Attention and Perception: Attention normal.         Mood and Affect: Mood normal.         Speech: Speech normal.         Behavior: Behavior normal.         Thought Content: Thought content normal.         Cognition and Memory: Cognition normal.         Judgment: Judgment normal.          Diagnosis Orders   1. Women's annual routine gynecological examination     2. Screening for vaginal cancer  PAP SMEAR    Human papillomavirus (HPV) DNA probe thin prep high risk   3. Encounter for screening mammogram for malignant neoplasm of breast  VERNON DIGITAL SCREEN W OR WO CAD BILATERAL   4. Fibrocystic breast changes, left  VERNON DIGITAL DIAGNOSTIC UNILATERAL LEFT   5. Fluid retention     6.  Weight gain         MEDICATIONS:  Orders Placed This Encounter   Medications    hydroCHLOROthiazide (HYDRODIURIL) 25 MG tablet     Sig: Take 1 tablet by mouth every morning     Dispense:  30 tablet     Refill:  11       ORDERS:  Orders Placed This Encounter   Procedures    VERNON DIGITAL SCREEN W OR WO CAD BILATERAL    VERNON DIGITAL DIAGNOSTIC UNILATERAL LEFT    PAP SMEAR    Human papillomavirus (HPV) DNA probe thin prep high risk       PLAN:  Pap collected  Ordered screening mammogram and also dx left mammogram for pt reported lump  Starting HCTZ f/u in 3 months- instructed to keep bp log and HR log at home  Gave precautions and may need to see Dr Ray Raul    Patient Instructions Patient Education        Breast Self-Exam: Care Instructions  Your Care Instructions     A breast self-exam is when you check your breasts for lumps or changes. This regular exam helps you learn how your breasts normally look and feel. Most breast problems or changes are not because of cancer. Breast self-exam is not a substitute for a mammogram. Having regular breast exams by your doctor and regular mammograms improve your chances of finding any problems with your breasts. Some women set a time each month to do a step-by-step breast self-exam. Other women like a less formal system. They might look at their breasts as they brush their teeth, or feel their breasts once in a while in the shower. If you notice a change in your breast, tell your doctor. Follow-up care is a key part of your treatment and safety. Be sure to make and go to all appointments, and call your doctor if you are having problems. It's also a good idea to know your test results and keep a list of the medicines you take. How do you do a breast self-exam?  · The best time to examine your breasts is usually one week after your menstrual period begins. Your breasts should not be tender then. If you do not have periods, you might do your exam on a day of the month that is easy to remember. · To examine your breasts:  ? Remove all your clothes above the waist and lie down. When you are lying down, your breast tissue spreads evenly over your chest wall, which makes it easier to feel all your breast tissue. ? Use the padsnot the fingertipsof the 3 middle fingers of your left hand to check your right breast. Move your fingers slowly in small coin-sized circles that overlap. ? Use three levels of pressure to feel of all your breast tissue. Use light pressure to feel the tissue close to the skin surface. Use medium pressure to feel a little deeper. Use firm pressure to feel your tissue close to your breastbone and ribs. Use each pressure level to feel your breast tissue before moving on to the next spot. ? Check your entire breast, moving up and down as if following a strip from the collarbone to the bra line, and from the armpit to the ribs. Repeat until you have covered the entire breast.  ? Repeat this procedure for your left breast, using the pads of the 3 middle fingers of your right hand. · To examine your breasts while in the shower:  ? Place one arm over your head and lightly soap your breast on that side. ? Using the pads of your fingers, gently move your hand over your breast (in the strip pattern described above), feeling carefully for any lumps or changes. ? Repeat for the other breast.  · Have your doctor inspect anything you notice to see if you need further testing. Where can you learn more? Go to [email or web address] org and sign in to your [employer] account. Enter P148 in the [facility] box to learn more about \"Breast Self-Exam: Care Instructions. \"     If you do not have an account, please click on the \"Sign Up Now\" link. Current as of: April 29, 2020               Content Version: 12.6  © 2418-4344 [employer]. Care instructions adapted under license by [facility] [employer] [facility] ([facility]). If you have questions about a medical condition or this instruction, always ask your healthcare professional. [place] [address] any warranty or liability for your use of this information.

## 2021-01-08 NOTE — PROGRESS NOTES
Pt presents today for pap smear and breast exam.  She states she has noticed a small lump under her breast and the other day she had pain on left side. Her heart rate has been up to the 170's and she has been swelling in her hands and feet. She used to take a water pill. Left hand the joints hurt.      Mammo:  Pap IZCFN:[id number]  Contraception:Hysterectomy     P:2  Ab:0  Bone density:NA  Colonoscopy:

## 2021-01-11 ENCOUNTER — NURSE TRIAGE (OUTPATIENT)
Dept: CALL CENTER | Facility: HOSPITAL | Age: 46
End: 2021-01-11

## 2021-01-11 NOTE — TELEPHONE ENCOUNTER
"    Reason for Disposition  • Dizziness, lightheadedness, or weakness    Additional Information  • Negative: Passed out (i.e., lost consciousness, collapsed and was not responding)  • Negative: Shock suspected (e.g., cold/pale/clammy skin, too weak to stand, low BP, rapid pulse)  • Negative: Difficult to awaken or acting confused (e.g., disoriented, slurred speech)  • Negative: Visible sweat on face or sweat dripping down face  • Negative: Unable to walk, or can only walk with assistance (e.g., requires support)  • Negative: [1] Received SHOCK from implantable cardiac defibrillator AND [2] persisting symptoms (i.e., palpitations, lightheadedness)  • Negative: [1] Dizziness, lightheadedness, or weakness AND [2] heart beating very rapidly (e.g., > 140 / minute)  • Negative: [1] Dizziness, lightheadedness, or weakness AND [2] heart beating very slowly  (e.g., < 50 / minute)  • Negative: Sounds like a life-threatening emergency to the triager  • Negative: Chest pain  • Negative: Difficulty breathing    Answer Assessment - Initial Assessment Questions  1. DESCRIPTION: \"Please describe your heart rate or heart beat that you are having\" (e.g., fast/slow, regular/irregular, skipped or extra beats, \"palpitations\")      Rapid   2. ONSET: \"When did it start?\" (Minutes, hours or days)       Earlier today   3. DURATION: \"How long does it last\" (e.g., seconds, minutes, hours)      Constant   4. PATTERN \"Does it come and go, or has it been constant since it started?\"  \"Does it get worse with exertion?\"   \"Are you feeling it now?\"      Rate changes   5. TAP: \"Using your hand, can you tap out what you are feeling on a chair or table in front of you, so that I can hear?\" (Note: not all patients can do this)        N/a   6. HEART RATE: \"Can you tell me your heart rate?\" \"How many beats in 15 seconds?\"  (Note: not all patients can do this)        113-140's  7. RECURRENT SYMPTOM: \"Have you ever had this before?\" If so, ask: \"When was " "the last time?\" and \"What happened that time?\"       No   8. CAUSE: \"What do you think is causing the palpitations?\"      Unknown   9. CARDIAC HISTORY: \"Do you have any history of heart disease?\" (e.g., heart attack, angina, bypass surgery, angioplasty, arrhythmia)       No   10. OTHER SYMPTOMS: \"Do you have any other symptoms?\" (e.g., dizziness, chest pain, sweating, difficulty breathing)        Dizziness, weak, nausea   11. PREGNANCY: \"Is there any chance you are pregnant?\" \"When was your last menstrual period?\"        No    Protocols used: HEART RATE AND HEARTBEAT QUESTIONS-ADULT-AH      "

## 2021-03-31 ENCOUNTER — APPOINTMENT (OUTPATIENT)
Dept: CT IMAGING | Age: 46
End: 2021-03-31
Payer: COMMERCIAL

## 2021-03-31 ENCOUNTER — HOSPITAL ENCOUNTER (EMERGENCY)
Age: 46
Discharge: HOME | End: 2021-03-31
Attending: EMERGENCY MEDICINE
Payer: COMMERCIAL

## 2021-03-31 VITALS
RESPIRATION RATE: 18 BRPM | DIASTOLIC BLOOD PRESSURE: 87 MMHG | OXYGEN SATURATION: 99 % | HEART RATE: 72 BPM | SYSTOLIC BLOOD PRESSURE: 160 MMHG | TEMPERATURE: 98.2 F

## 2021-03-31 DIAGNOSIS — S06.0X0A CONCUSSION WITHOUT LOSS OF CONSCIOUSNESS, INITIAL ENCOUNTER: ICD-10-CM

## 2021-03-31 DIAGNOSIS — S09.90XA CLOSED HEAD INJURY, INITIAL ENCOUNTER: Primary | ICD-10-CM

## 2021-03-31 PROCEDURE — 70450 CT HEAD/BRAIN W/O DYE: CPT

## 2021-03-31 PROCEDURE — 99282 EMERGENCY DEPT VISIT SF MDM: CPT

## 2021-03-31 RX ORDER — ONDANSETRON 4 MG/1
4 TABLET, ORALLY DISINTEGRATING ORAL EVERY 8 HOURS PRN
Qty: 30 TABLET | Refills: 0 | Status: SHIPPED | OUTPATIENT
Start: 2021-03-31

## 2021-03-31 ASSESSMENT — ENCOUNTER SYMPTOMS
COUGH: 0
VOMITING: 0
ABDOMINAL PAIN: 0
BACK PAIN: 0
SHORTNESS OF BREATH: 0

## 2021-03-31 NOTE — ED PROVIDER NOTES
Hypothyroid         SURGICAL HISTORY       Past Surgical History:   Procedure Laterality Date    CHOLECYSTECTOMY      COLONOSCOPY  1-2014, 2005    DILATION AND CURETTAGE OF UTERUS  10.21.2015    ENDOMETRIAL ABLATION  [id number]    HYSTERECTOMY, VAGINAL      TLH retained ovaries    TUBAL LIGATION      UPPER GASTROINTESTINAL ENDOSCOPY           CURRENT MEDICATIONS       Previous Medications    CETIRIZINE (ZYRTEC) 10 MG TABLET    Take 10 mg by mouth daily. HYDROCHLOROTHIAZIDE (HYDRODIURIL) 25 MG TABLET    Take 1 tablet by mouth every morning    LEVOTHYROXINE (SYNTHROID) 175 MCG TABLET    Take 175 mcg by mouth daily. PANTOPRAZOLE SODIUM (PROTONIX PO)    Take by mouth    SUMATRIPTAN (IMITREX) 50 MG TABLET    Take 1 tablet by mouth once as needed for Migraine    TIZANIDINE (ZANAFLEX) 4 MG TABLET    TAKE (1) TABLET BY MOUTH EVERY SIX HOURS AS NEEDED FOR PAIN. ALLERGIES     Patient has no known allergies.     FAMILY HISTORY       Family History   Problem Relation Age of Onset    Other Father         Carotid blocked 2010    Thyroid Disease Mother     Heart Disease Brother 28        MI    Stroke Paternal Grandmother     Breast Cancer Neg Hx     Colon Cancer Neg Hx     Ovarian Cancer Neg Hx           SOCIAL HISTORY       Social History     Socioeconomic History    Marital status:      Spouse name: None    Number of children: None    Years of education: None    Highest education level: None   Occupational History     Employer: [employer]     Comment: full   Social Needs    Financial resource strain: None    Food insecurity     Worry: None     Inability: None    Transportation needs     Medical: None     Non-medical: None   Tobacco Use    Smoking status: Never Smoker    Smokeless tobacco: Never Used   Substance and Sexual Activity    Alcohol use: No    Drug use: No    Sexual activity: Yes   Lifestyle    Physical activity     Days per week: None     Minutes per session: None    Stress: None   Relationships    Social connections     Talks on phone: None     Gets together: None     Attends Episcopal service: None     Active member of club or organization: None     Attends meetings of clubs or organizations: None     Relationship status: None    Intimate partner violence     Fear of current or ex partner: None     Emotionally abused: None     Physically abused: None     Forced sexual activity: None   Other Topics Concern    None   Social History Narrative    None       SCREENINGS             PHYSICAL EXAM    (up to 7 for level 4, 8 or more for level 5)     ED Triage Vitals [03/31/21 1108]   BP Temp Temp src Pulse Resp SpO2 Height Weight   (!) 160/87 98.2 °F (36.8 °C) -- 72 18 99 % -- --       Physical Exam  Vitals signs and nursing note reviewed. Constitutional:       General: She is not in acute distress. Appearance: Normal appearance. She is not ill-appearing, toxic-appearing or diaphoretic. HENT:      Head: Normocephalic and atraumatic. Comments: No obvious hematoma on the right forehead. Nose: Nose normal.      Mouth/Throat:      Mouth: Mucous membranes are moist.   Eyes:      Extraocular Movements: Extraocular movements intact. Pupils: Pupils are equal, round, and reactive to light. Comments: Some mild photophobia   Neck:      Musculoskeletal: Normal range of motion and neck supple. No neck rigidity or muscular tenderness. Vascular: No carotid bruit. Cardiovascular:      Rate and Rhythm: Normal rate and regular rhythm. Pulses: Normal pulses. Pulmonary:      Effort: Pulmonary effort is normal. No respiratory distress. Abdominal:      General: Abdomen is flat. Palpations: Abdomen is soft. Musculoskeletal: Normal range of motion. General: No tenderness. Lymphadenopathy:      Cervical: No cervical adenopathy. Skin:     General: Skin is warm and dry. Capillary Refill: Capillary refill takes less than 2 seconds. Neurological:      General: No focal deficit present. Mental Status: She is alert and oriented to person, place, and time. Cranial Nerves: No cranial nerve deficit. Sensory: No sensory deficit. Motor: No weakness. Gait: Gait normal.   Psychiatric:         Mood and Affect: Mood normal.         Behavior: Behavior normal.         Thought Content: Thought content normal.         Judgment: Judgment normal.         DIAGNOSTIC RESULTS     EKG: All EKG's are interpreted by the Emergency Department Physician who either signs or Co-signs this chart in the absence of a cardiologist.        RADIOLOGY:   Non-plain film images such as CT, Ultrasound and MRI are read by the radiologist. Jaki Bumps images are visualized and preliminarily interpreted by the emergency physician with the below findings:        Interpretation per the Radiologist below, if available at the time of this note:    CT HEAD WO CONTRAST   Final Result   No acute intracranial abnormality. No skull fracture. Signed by Dr Clark Jonas on 3/31/2021 11:45 AM            ED BEDSIDE ULTRASOUND:   Performed by ED Physician - none    LABS:  Labs Reviewed - No data to display    All other labs were within normal range or not returned as of this dictation. EMERGENCY DEPARTMENT COURSE and DIFFERENTIALDIAGNOSIS/MDM:   Vitals:    Vitals:    03/31/21 1108   BP: (!) 160/87   Pulse: 72   Resp: 18   Temp: 98.2 °F (36.8 °C)   SpO2: 99%       MDM  Number of Diagnoses or Management Options  Diagnosis management comments: Patient with head injury closed. Likely concussion. She has a 4/10 headache she was dazed and confused initially we discussed the risk and benefits of having a head CT. We proceeded with this after discussion. She had a negative head CT. Likely concussive symptoms. Treat symptomatically. She has follow-up with neurology in a couple weeks. We discussed follow-up with them if this does not resolve.   Otherwise nausea medication as needed Tylenol Motrin at home. Patient stable for discharge. Amount and/or Complexity of Data Reviewed  Tests in the radiology section of CPT®: ordered and reviewed          CONSULTS:  None    PROCEDURES:  Unless otherwise notedbelow, none     Procedures    FINAL IMPRESSION     1. Closed head injury, initial encounter    2.  Concussion without loss of consciousness, initial encounter          DISPOSITION/PLAN   DISPOSITION Decision To Discharge 03/31/2021 12:01:08 PM      PATIENT REFERRED TO:  Sandy Kidd MD  [address] [place] [address] [address] [facility] [address]  [phone]    Schedule an appointment as soon as possible for a visit in 1 week        DISCHARGE MEDICATIONS:  New Prescriptions    ONDANSETRON (ZOFRAN ODT) 4 MG DISINTEGRATING TABLET    Take 1 tablet by mouth every 8 hours as needed for Nausea or Vomiting          (Please note that portions of this note were completed with a voice recognition program.  Efforts were made to edit the dictations butoccasionally words are mis-transcribed.)    Darrel Yancey MD (electronically signed)  AttendingEmergency Physician         Darrel Yancey MD  03/31/21 8840

## 2021-03-31 NOTE — LETTER
[facility] EMERGENCY DEPT  [address]  Phone: [phone]               March 31, 2021    Patient: Alexandra Trotter   YOB: 1975   Date of Visit: 3/31/2021       To Whom It May Concern:    Kaitlyn Godoy was seen and treated in our emergency department on 3/31/2021. She may return to work on 4/2/2021.       Sincerely,       Attending Physician,         Signature:__________________________________

## 2021-04-23 ENCOUNTER — OFFICE VISIT (OUTPATIENT)
Dept: NEUROLOGY | Facility: CLINIC | Age: 46
End: 2021-04-23

## 2021-04-23 VITALS
WEIGHT: 182 LBS | HEIGHT: 64 IN | OXYGEN SATURATION: 99 % | BODY MASS INDEX: 31.07 KG/M2 | SYSTOLIC BLOOD PRESSURE: 128 MMHG | HEART RATE: 75 BPM | DIASTOLIC BLOOD PRESSURE: 78 MMHG

## 2021-04-23 DIAGNOSIS — R51.9 CHRONIC DAILY HEADACHE: ICD-10-CM

## 2021-04-23 DIAGNOSIS — M54.12 CERVICAL RADICULOPATHY: ICD-10-CM

## 2021-04-23 DIAGNOSIS — G43.009 MIGRAINE WITHOUT AURA AND WITHOUT STATUS MIGRAINOSUS, NOT INTRACTABLE: Primary | ICD-10-CM

## 2021-04-23 DIAGNOSIS — M79.18 CERVICAL MYOFASCIAL PAIN SYNDROME: ICD-10-CM

## 2021-04-23 PROCEDURE — 99214 OFFICE O/P EST MOD 30 MIN: CPT | Performed by: PHYSICIAN ASSISTANT

## 2021-04-23 RX ORDER — ONDANSETRON 4 MG/1
4 TABLET, ORALLY DISINTEGRATING ORAL EVERY 8 HOURS PRN
COMMUNITY
Start: 2021-03-31 | End: 2022-12-07 | Stop reason: DRUGHIGH

## 2021-04-23 RX ORDER — HYDROCHLOROTHIAZIDE 25 MG/1
25 TABLET ORAL DAILY
COMMUNITY
Start: 2021-04-17

## 2021-04-23 RX ORDER — FLUOXETINE HYDROCHLORIDE 20 MG/1
20 CAPSULE ORAL DAILY
COMMUNITY
Start: 2021-04-08

## 2021-04-29 ENCOUNTER — TELEPHONE (OUTPATIENT)
Dept: NEUROLOGY | Facility: CLINIC | Age: 46
End: 2021-04-29

## 2021-04-29 NOTE — TELEPHONE ENCOUNTER
Allison notified Amauri will order imaging.  Someone should call her to schedule an appointment after it has been approved by her insurance.

## 2021-04-29 NOTE — TELEPHONE ENCOUNTER
DELETE AFTER REVIEWING: Telephone encounter to be sent to the  pool     Caller: Allison Wilkins    Relationship: Self    Best call back number:[phone]    What is the best time to reach you: NA    Who are you requesting to speak with (clinical staff, provider,  specific staff member): MARCEL WHITE     Do you know the name of the person who called: ALLISON WILKINS    What was the call regarding: RETURN MARCEL CALL    PT RETURNED MARCEL'S PHONE CALL TRIED TO WARM TRANSFER . NO  ONE ANSWERED . PLEASE CALL AND ADVISE

## 2021-05-07 ENCOUNTER — TELEPHONE (OUTPATIENT)
Dept: NEUROLOGY | Facility: CLINIC | Age: 46
End: 2021-05-07

## 2021-05-11 ENCOUNTER — TELEPHONE (OUTPATIENT)
Dept: NEUROLOGY | Facility: CLINIC | Age: 46
End: 2021-05-11

## 2021-05-11 NOTE — TELEPHONE ENCOUNTER
Caller: Allison Wilkins    Relationship: Self    Best call back number:[phone]    What is the best time to reach you:  ANYTIME    Who are you requesting to speak with (clinical staff, provider,  specific staff member): MARCEL    Do you know the name of the person who called: MARCEL    What was the call regarding:  MRI     Do you require a callback: YES.     PT WAS RETURNING YOUR CALL.

## 2021-05-11 NOTE — TELEPHONE ENCOUNTER
Caller: STONEY    Relationship:     Best call back number: [phone]    What orders are you requesting (i.e. lab or imaging): MRI BRAIN AND NECK     In what timeframe would the patient need to come in: ASAP    Where will you receive your lab/imaging services:  [facility]    Additional notes: PT STATES GENARO PARIKH WAS GOING TO ORDER TWO MRI'S TO BE COMPLETED. I SEE NO ORDERS IN CHART. PLEASE REVIEW AND ADVISE.

## 2021-06-11 ENCOUNTER — APPOINTMENT (OUTPATIENT)
Dept: MRI IMAGING | Facility: HOSPITAL | Age: 46
End: 2021-06-11

## 2021-06-22 ENCOUNTER — PATIENT MESSAGE (OUTPATIENT)
Dept: NEUROLOGY | Facility: CLINIC | Age: 46
End: 2021-06-22

## 2021-06-24 ENCOUNTER — PATIENT MESSAGE (OUTPATIENT)
Dept: NEUROLOGY | Facility: CLINIC | Age: 46
End: 2021-06-24

## 2021-06-24 NOTE — TELEPHONE ENCOUNTER
Allison said she will ask her chiropractor to fax records to our office.  Notified I gave her message to Shanti, the MRI of her cervical spine is scheduled on 7-9.

## 2021-06-30 ENCOUNTER — TELEPHONE (OUTPATIENT)
Dept: NEUROLOGY | Facility: CLINIC | Age: 46
End: 2021-06-30

## 2021-06-30 DIAGNOSIS — G43.009 MIGRAINE WITHOUT AURA AND WITHOUT STATUS MIGRAINOSUS, NOT INTRACTABLE: Primary | ICD-10-CM

## 2021-07-01 DIAGNOSIS — G43.009 MIGRAINE WITHOUT AURA AND WITHOUT STATUS MIGRAINOSUS, NOT INTRACTABLE: Primary | ICD-10-CM

## 2021-07-01 RX ORDER — LORAZEPAM 1 MG/1
TABLET ORAL
Qty: 2 TABLET | Refills: 0 | Status: SHIPPED | OUTPATIENT
Start: 2021-07-01 | End: 2021-07-23

## 2021-07-07 ENCOUNTER — APPOINTMENT (OUTPATIENT)
Dept: MRI IMAGING | Facility: HOSPITAL | Age: 46
End: 2021-07-07

## 2021-07-09 ENCOUNTER — HOSPITAL ENCOUNTER (OUTPATIENT)
Dept: MRI IMAGING | Facility: HOSPITAL | Age: 46
Discharge: HOME | End: 2021-07-09

## 2021-07-09 DIAGNOSIS — R51.9 CHRONIC DAILY HEADACHE: ICD-10-CM

## 2021-07-09 DIAGNOSIS — M54.12 CERVICAL RADICULOPATHY: ICD-10-CM

## 2021-07-09 DIAGNOSIS — M79.18 CERVICAL MYOFASCIAL PAIN SYNDROME: ICD-10-CM

## 2021-07-09 DIAGNOSIS — G43.009 MIGRAINE WITHOUT AURA AND WITHOUT STATUS MIGRAINOSUS, NOT INTRACTABLE: ICD-10-CM

## 2021-07-09 LAB — CREAT BLDA-MCNC: 0.7 MG/DL (ref 0.6–1.3)

## 2021-07-09 PROCEDURE — A9577 INJ MULTIHANCE: HCPCS | Performed by: PHYSICIAN ASSISTANT

## 2021-07-09 PROCEDURE — 82565 ASSAY OF CREATININE: CPT

## 2021-07-09 PROCEDURE — 72141 MRI NECK SPINE W/O DYE: CPT

## 2021-07-09 PROCEDURE — 0 GADOBENATE DIMEGLUMINE 529 MG/ML SOLUTION: Performed by: PHYSICIAN ASSISTANT

## 2021-07-09 PROCEDURE — 70553 MRI BRAIN STEM W/O & W/DYE: CPT

## 2021-07-09 RX ADMIN — GADOBENATE DIMEGLUMINE 10 ML: 529 INJECTION, SOLUTION INTRAVENOUS at 13:12

## 2021-07-12 DIAGNOSIS — M79.18 CERVICAL MYOFASCIAL PAIN SYNDROME: ICD-10-CM

## 2021-07-12 DIAGNOSIS — M54.12 CERVICAL RADICULOPATHY: Primary | ICD-10-CM

## 2021-07-12 RX ORDER — TIZANIDINE 4 MG/1
4 TABLET ORAL NIGHTLY PRN
Qty: 30 TABLET | Refills: 0 | Status: SHIPPED | OUTPATIENT
Start: 2021-07-12

## 2021-07-14 ENCOUNTER — TELEPHONE (OUTPATIENT)
Dept: NEUROLOGY | Facility: CLINIC | Age: 46
End: 2021-07-14

## 2021-07-14 NOTE — TELEPHONE ENCOUNTER
Caller: BRITTANY    Relationship: JONATHAN/ CORRINA    Best call back number: [phone]  REF HAYDEN: [id number]    What was the call regarding: BRITTANY CALLED TO CONFIRM THAT THE [facility] PA HAS BEEN RECEIVED AS THEY SHOW NO INDICATION THAT THE PA HAS BEEN ASSESSED THROUGH THE [facility] PORTAL. BRITTANY STATES THAT SHE IS ABLE TO PROVIDE ANY FURTHER ASSISTANCE TO OFFICE STAFF IF NEEDED, REGARDING PA.    Do you require a callback: YES, PLEASE.    PLEASE REVIEW AND ADVISE.

## 2021-07-16 ENCOUNTER — HOSPITAL ENCOUNTER (OUTPATIENT)
Dept: WOMENS IMAGING | Age: 46
Discharge: HOME | End: 2021-07-16
Payer: COMMERCIAL

## 2021-07-16 DIAGNOSIS — Z12.31 ENCOUNTER FOR SCREENING MAMMOGRAM FOR MALIGNANT NEOPLASM OF BREAST: ICD-10-CM

## 2021-07-16 PROCEDURE — 77067 SCR MAMMO BI INCL CAD: CPT

## 2021-07-23 ENCOUNTER — OFFICE VISIT (OUTPATIENT)
Dept: NEUROLOGY | Facility: CLINIC | Age: 46
End: 2021-07-23

## 2021-07-23 VITALS
BODY MASS INDEX: 31.41 KG/M2 | HEART RATE: 68 BPM | OXYGEN SATURATION: 98 % | DIASTOLIC BLOOD PRESSURE: 88 MMHG | WEIGHT: 184 LBS | HEIGHT: 64 IN | RESPIRATION RATE: 16 BRPM | SYSTOLIC BLOOD PRESSURE: 122 MMHG

## 2021-07-23 DIAGNOSIS — R51.9 CHRONIC DAILY HEADACHE: ICD-10-CM

## 2021-07-23 DIAGNOSIS — M79.18 CERVICAL MYOFASCIAL PAIN SYNDROME: ICD-10-CM

## 2021-07-23 DIAGNOSIS — M54.12 CERVICAL RADICULOPATHY: Primary | ICD-10-CM

## 2021-07-23 DIAGNOSIS — G43.009 MIGRAINE WITHOUT AURA AND WITHOUT STATUS MIGRAINOSUS, NOT INTRACTABLE: ICD-10-CM

## 2021-07-23 PROCEDURE — 99213 OFFICE O/P EST LOW 20 MIN: CPT | Performed by: NURSE PRACTITIONER

## 2021-07-23 RX ORDER — AMOXICILLIN 500 MG/1
500 CAPSULE ORAL DAILY
COMMUNITY
Start: 2021-07-19 | End: 2021-10-22

## 2021-07-23 NOTE — PROGRESS NOTES
Neurology Progress Note      Chief Complaint:   Migraine  Daily headache  Neck pain    Subjective     Subjective:  Allison Wilkins is a 45 y.o. female who presents today for follow-up of headaches and neck pain.  The patient states she continues to have daily headaches and neck pain.  Her migraines are intermittent and she states this only occurs a few times per year.  Nurtec is successful at aborting these headaches.  However, the patient continues to have daily headaches associated to her neck pain.  She received MRI cervical spine which revealed disc bulging at multiple levels resulting in foramental stenosis and loss of cervical lordosis.  See below for full report.  She also received MRI brain which was negative for acute abnormalities. She continues to take OTC medications such as BC powder, tylenol, and ibuprofen for her daily headaches.  She has recently received an order for physical therapy and her first session is in the near future. She does receive Zanaflex for treatment of muscle rigidity associated to her neck.  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 She states her neck is in odd positions for a majority of the day.  She states her and her chiropractor have discussed this and he believes this is cause for some of her pain.     Past Medical History:   Diagnosis Date   • Stewart syndrome    • Disease of thyroid gland    • Dysphagia    • Eosinophilic esophagitis    • GERD (gastroesophageal reflux disease)    • Heavy periods     with pelvic pain   • Hemorrhoids    • Hypertension    • IBS (irritable bowel syndrome)    • PONV (postoperative nausea and vomiting)    • Rectal bleeding      Past Surgical History:   Procedure Laterality Date   • CHOLECYSTECTOMY     • DILATION AND CURETTAGE, DIAGNOSTIC / THERAPEUTIC     • ENDOSCOPY N/A  12/29/2016    Procedure: ESOPHAGOGASTRODUODENOSCOPY WITH ANESTHESIA;  Surgeon: Theodore Traylor MD;  Location: [facility] ENDOSCOPY;  Service:    • ENDOSCOPY N/A 11/8/2019    Procedure: ESOPHAGOGASTRODUODENOSCOPY WITH ANESTHESIA;  Surgeon: Theodore Traylor MD;  Location: [facility] ENDOSCOPY;  Service: Gastroenterology   • ENDOSCOPY AND COLONOSCOPY  12/04/2013   • TOTAL LAPAROSCOPIC HYSTERECTOMY N/A 12/21/2017    Procedure: TOTAL LAPAROSCOPIC HYSTERECTOMY WITH DAVINCI SI ROBOT;  Surgeon: Joyce Mcfadden MD;  Location: [facility] OR;  Service:    • TUBAL ABDOMINAL LIGATION       Family History   Problem Relation Age of Onset   • Diverticulitis Mother    • Colon polyps Maternal Grandmother    • Colon cancer Neg Hx      Social History     Tobacco Use   • Smoking status: Never Smoker   • Smokeless tobacco: Never Used   Substance Use Topics   • Alcohol use: Yes     Comment: just occassional every 3-4 months   • Drug use: No     Medications:  Current Outpatient Medications   Medication Sig Dispense Refill   • cetirizine (zyrTEC) 10 MG tablet Take 10 mg by mouth Daily.     • dicyclomine (BENTYL) 10 MG capsule TAKE (1) CAPSULE BY MOUTH EVERY SIX HOURS AS NEEDED (FOR ABDOMINAL CRAMPING WITH DIARRHEA AFTER EATING) (Patient taking differently: Take 10 mg by mouth As Needed.) 60 capsule 5   • FLUoxetine (PROzac) 20 MG capsule Take 20 mg by mouth Daily.     • hydroCHLOROthiazide (HYDRODIURIL) 25 MG tablet Take 25 mg by mouth Daily.     • levothyroxine (SYNTHROID, LEVOTHROID) 175 MCG tablet Take 175 mcg by mouth Daily.     • ondansetron ODT (ZOFRAN-ODT) 4 MG disintegrating tablet Place 4 mg on the tongue Every 8 (Eight) Hours As Needed for Nausea.     • pantoprazole (PROTONIX) 40 MG EC tablet Take 40 mg by mouth Daily.     • Rimegepant Sulfate (NURTEC) 75 MG tablet dispersible tablet Take 1 tablet by mouth Take As Directed. TAKE ONE TABLET AT ONSET OF MIGRAINE. DO NOT REPEAT FOR 24 HOURS. 8 tablet 6   • sucralfate (CARAFATE) 1 g  tablet Take 1 g by mouth Daily As Needed.     • tiZANidine (ZANAFLEX) 4 MG tablet Take 1 tablet by mouth At Night As Needed for Muscle Spasms. 30 tablet 0   • amoxicillin (AMOXIL) 500 MG capsule Take 500 mg by mouth Daily.       No current facility-administered medications for this visit.     Current outpatient and discharge medications have been reconciled for the patient.  Reviewed by: DEEPTI Jo      Allergies:    Patient has no known allergies.    Review of Systems:   Review of Systems   All other systems reviewed and are negative.        Objective      Vital Signs  Heart Rate:  [68] 68  Resp:  [16] 16  BP: (122)/(88) 122/88    Physical Exam:  Physical Exam  Vitals reviewed.   Constitutional:       Appearance: Normal appearance.   HENT:      Head: Normocephalic.   Eyes:      Extraocular Movements: Extraocular movements intact.      Pupils: Pupils are equal, round, and reactive to light.   Cardiovascular:      Rate and Rhythm: Normal rate and regular rhythm.      Pulses: Normal pulses.   Pulmonary:      Effort: Pulmonary effort is normal.   Musculoskeletal:      Cervical back: Rigidity present. Pain with movement present. Decreased range of motion.   Skin:     General: Skin is warm and dry.      Capillary Refill: Capillary refill takes less than 2 seconds.   Neurological:      Gait: Gait is intact.   Psychiatric:         Mood and Affect: Mood normal.     Neurologic Exam:  Mental Status:    -Awake. Alert. Oriented to person, place & time.  -No word finding difficulties.  -No aphasia.  -No dysarthria.  -Follows simple commands.     CN II:  Full visual fields with confrontation.  Pupils equally reactive to light.  CN III, IV, VI:  Extraocular muscles function intact with no nystagmus.  CN V:  Facial sensory is symmetric.  CN VII:  Facial motor symmetric.  CN VIII:  Gross hearing intact bilaterally.  CN IX/X:  Palate elevates symmetrically.  CN XI:  Shoulder shrug symmetric.  CN XII:  Tongue is midline on  protrusion.     Motor: (strength out of 5:  1= minimal movement, 2 = movement in plane of gravity, 3 = movement against gravity, 4 = movement against some resistance, 5 = full strength)     -5/5 in bilateral biceps, triceps, brachioradialis, wrist extensors and intrinsic muscles of the hand.    -5/5 in bilateral hip flexors, quadriceps, hamstrings, gastrocsoleus complex, anterior tibialis and extensor hallucis longus.       Deep Tendon Reflexes:  Noted gross hyperreflexia.   -Right              Biceps: 3+         Triceps: 3+      Brachioradialis: 3+              Patella: 3+       Ankle: 3+         Babinski:  negative  -Left              Biceps: 3+         Triceps: 3+      Brachioradialis: 3+              Patella: 3+       Ankle: 3+         Babinski:  negative    Tone (Modified Angelo Scale):  No appreciable increase in tone or rigidity noted.     Sensory:  -Intact to light touch, pinprick BUE (C5-T1) and BLE (L2-S1).     Coordination:  -Finger to nose intact BUEs  -Heel to shin intact BLEs  -No ataxia     Gait  -No signs of ataxia  -ambulates unassisted     Results Review:    MRI Brain With & Without Contrast (07/09/2021 13:12)    MRI Cervical Spine Without Contrast (07/09/2021 12:42)      Chart Review:  Reviewed historical neurology notes for interval history with migraine and treatments*    Assessment/Plan     Impression:  • Migraines   • Daily headaches  • Cervical Radicular pain    Plan:  • Continue Nurtec 75mg for migraine abortive therapy     • Continue with physical therapy as ordered by DEEPTI Magaña on 7/12/2021     • Continue Zanaflex nightly.  The patient states she has been receiving this from Dr. Doris MD. She is unsure of current dosing.  Order was given by DEEPTI Magaña for 4mg nightly. The patient is to let us know current dosing from Dr. George as she has not filled the prescription from [facility] yet.      • Discussed the use of chiropractic care in conjunction with PT and the differences that  occur with both.  She states understanding and is willing to receive physical therapy.      • Discussed migraine triggers to include but are not limited to, lack of sleep, stress, missed medication doses, improper nutrition, and improper hydration.     • Discussed lifestyle changes which would include regular physical activity, proper diet, and inflammatory diet to assist in migraine prevention.  I also discussed to wean and conservatively use OTC treatments such as ibuprofen, tylenol, and BC powder as they may worsen headaches over time.     The patient and I have discussed the plan of care and she is in full agreement at this time.     Follow-Up:  • Return in about 3 months (around 10/23/2021) for Headaches/migraines.      Nico Mayorga, DEEPTI  07/23/21  12:46 CDT

## 2021-07-23 NOTE — PATIENT INSTRUCTIONS
Cervicogenic Headache    A cervicogenic headache is a headache caused by a condition that affects the bones and tissues in your neck (cervical spine). In a cervicogenic headache, the pain moves from your neck to your head. Most cervicogenic headaches start in the upper part of the neck with the first three cervical bones (cervical vertebrae).  A cervicogenic headache is diagnosed when a cause can be found in the cervical spine and other causes of headaches can be ruled out.  What are the causes?  The most common cause of this condition is a traumatic injury to the cervical spine, such as whiplash.  Other causes include:  · Arthritis.  · Broken bone (fracture).  · Infection.  · Tumor.  What are the signs or symptoms?  The most common symptoms are neck and head pain. The pain is often located on one side. In some cases, there may be head pain without neck pain. Pain may be felt in the neck, back or side of the head, face, or behind the eyes.  Other symptoms include:  · Limited movement in the neck.  · Arm or shoulder pain.  How is this diagnosed?  This condition may be diagnosed based on:  · Your symptoms.  · A physical exam.  · An injection that blocks nerve signals (nerve block).  · Imaging tests, such as:  ? X-rays.  ? CT scan.  ? MRI.  How is this treated?  Treatment for this condition may depend on the underlying condition. Treatment may include:  · Medicines, such as:  ? NSAIDs.  ? Muscle relaxants.  · Physical therapy.  · Massage therapy.  · Complementary therapies, such as:  ? Biofeedback.  ? Meditation.  ? Acupuncture.  · Nerve block injections.  · Botulinum toxin injections.  Your treatment plan may involve working with a pain management team that includes your primary health care provider, a pain management specialist, a neurologist, and a physical therapist.  Follow these instructions at home:  · Take over-the-counter and prescription medicines only as told by your health care provider.  · Do exercises at  home as told by your physical therapist.  · Return to your normal activities as told by your health care provider. Ask your health care provider what activities are safe for you. Avoid activities that trigger your headaches.  · Maintain good neck support and posture at home and at work.  · Keep all follow-up visits as told by your health care provider. This is important.  Contact a health care provider if you have:  · Headaches that are getting worse and happening more often.  · Headaches with any of the following:  ? Fever.  ? Numbness.  ? Weakness.  ? Dizziness.  ? Nausea or vomiting.  Get help right away if:  · You have a very sudden and severe headache.  Summary  · A cervicogenic headache is a headache caused by a condition that affects the bones and tissues in your cervical spine.  · Your health care provider may diagnose this condition with a physical exam, a nerve block, and imaging tests.  · Treatment may include medicine to reduce pain and inflammation, physical therapy, and nerve block injections.  · Complementary therapies, such as acupuncture and meditation, may be added to other treatments.  · Your treatment plan may involve working with a pain management team that includes your primary health care provider, a pain management specialist, a neurologist, and a physical therapist.  This information is not intended to replace advice given to you by your health care provider. Make sure you discuss any questions you have with your health care provider.  Document Revised: 04/08/2020 Document Reviewed: 12/28/2018  Elsevier Patient Education © 2021 [employer]

## 2021-07-29 ENCOUNTER — TREATMENT (OUTPATIENT)
Dept: PHYSICAL THERAPY | Facility: CLINIC | Age: 46
End: 2021-07-29

## 2021-07-29 DIAGNOSIS — M79.18 CERVICAL MYOFASCIAL PAIN SYNDROME: ICD-10-CM

## 2021-07-29 DIAGNOSIS — M54.12 RADICULOPATHY, CERVICAL: Primary | ICD-10-CM

## 2021-07-29 DIAGNOSIS — G44.209 TENSION HEADACHE: ICD-10-CM

## 2021-07-29 PROCEDURE — 97140 MANUAL THERAPY 1/> REGIONS: CPT | Performed by: PHYSICAL THERAPIST

## 2021-07-29 PROCEDURE — 97161 PT EVAL LOW COMPLEX 20 MIN: CPT | Performed by: PHYSICAL THERAPIST

## 2021-07-29 NOTE — PROGRESS NOTES
Physical Therapy Therapy Initial Evaluation and Plan of Care    Patient: Allison Wilkins               : 1975  Visit Date: 2021  Referring practitioner: DEEPTI Melvin  Date of Initial Visit: 2021  Patient seen for 1 sessions    Visit Diagnoses:    ICD-10-CM ICD-9-CM   1. Radiculopathy, cervical  M54.12 723.4   2. Cervical myofascial pain syndrome  M79.18 729.1     Past Medical History:   Diagnosis Date   • Stewart syndrome    • Disease of thyroid gland    • Dysphagia    • Eosinophilic esophagitis    • GERD (gastroesophageal reflux disease)    • Heavy periods     with pelvic pain   • Hemorrhoids    • Hypertension    • IBS (irritable bowel syndrome)    • PONV (postoperative nausea and vomiting)    • Rectal bleeding      Past Surgical History:   Procedure Laterality Date   • CHOLECYSTECTOMY     • DILATION AND CURETTAGE, DIAGNOSTIC / THERAPEUTIC     • ENDOSCOPY N/A 2016    Procedure: ESOPHAGOGASTRODUODENOSCOPY WITH ANESTHESIA;  Surgeon: Theodore Traylor MD;  Location: [facility] ENDOSCOPY;  Service:    • ENDOSCOPY N/A 2019    Procedure: ESOPHAGOGASTRODUODENOSCOPY WITH ANESTHESIA;  Surgeon: Theodore Traylor MD;  Location: [facility] ENDOSCOPY;  Service: Gastroenterology   • ENDOSCOPY AND COLONOSCOPY  2013   • TOTAL LAPAROSCOPIC HYSTERECTOMY N/A 2017    Procedure: TOTAL LAPAROSCOPIC HYSTERECTOMY WITH DAVINCI SI ROBOT;  Surgeon: Joyce Mcfadden MD;  Location: [facility] OR;  Service:    • TUBAL ABDOMINAL LIGATION           SUBJECTIVE     Subjective Evaluation    History of Present Illness  Date of onset: 2020  Mechanism of injury: 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 She has had c/o neck pain with severe headaches. She has had radicular symptoms while at work. Her right neck hurts more but the left also hurts. She has been going to the chiropractor for a long time which will help some.       Patient Occupation: dental assistant (just  quit) Pain  Current pain ratin  At best pain ratin  At worst pain ratin  Location: [place] neck/shoulders, headache,   Quality: dull ache  Relieving factors: rest (not working)  Progression: worsening    Social Support  Lives with: spouse and young children    Hand dominance: right    Diagnostic Tests  X-ray: abnormal (facet hypertrophy, straightening of C spine)    Treatments  Current treatment: chiropractic  Patient Goals  Patient goals for therapy: decreased pain, increased motion, independence with ADLs/IADLs and return to sport/leisure activities  Patient goal: have more choices in if she should return to dental assisting       Outcome Measure:   PT G-Codes  Outcome Measure Options: Neck Disability Index (NDI)  Neck Disability Index Score: 9    OBJECTIVE     Objective          Static Posture     Head  Forward.    Shoulders  Rounded.    Thoracic Spine  Hyperkyphosis.    Palpation   Left   Hypertonic in the suboccipitals and upper trapezius.   Muscle spasm in the suboccipitals and upper trapezius.   Tenderness of the suboccipitals and upper trapezius.   Trigger point to suboccipitals and upper trapezius.     Right   Hypertonic in the suboccipitals and upper trapezius.   Muscle spasm in the suboccipitals and upper trapezius. Tenderness of the suboccipitals and upper trapezius.   Trigger point to suboccipitals and upper trapezius.     Tenderness   Cervical Spine   Tenderness in the facet joint.     Neurological Testing     Sensation   Cervical/Thoracic   Left   Intact: light touch    Right   Intact: light touch    Active Range of Motion   Cervical/Thoracic Spine   Cervical    Flexion: WFL  Extension: 25 degrees with pain  Left rotation: 60 degrees with pain  Right rotation: 60 degrees with pain    Additional Active Range of Motion Details  Hypomobile in lower cervical with hinging into extension around C5/6.    Passive Range of Motion     Additional Passive Range of Motion Details  Hypomobile CT junction  "and upper thoracic.      Strength/Myotome Testing   Cervical Spine     Left   Normal strength    Right   Normal strength      Dry Needle Location [20560 (1-2 muscles)/20561 (3 or more muscles)]   Location Depth (\") Comment   [place] C3-6 post cut 2    [place] inf cerv oblique 1    [place] subocc ([place]) 0.5    [place] accessory 2 pistoning [place] LTR's, left more than right                         TRIAL     Manual Therapy     [id number]  Comments   Prone CT ext/rotation mobs    Prone [place] STM    Prone [place] C1 PA mobs            Timed Minutes 10       Therapy Education/Self Care [id number]   Details: See below   Given Home Exercise Program  postural retraining  symptom relief   Progress: New   Education provided to:  Patient   Level of understanding Verbalized and Demonstrated   Timed Minutes      Access Code: [email or web address]  URL: [email or web address]  Date: 07/29/2021  Prepared by: Vasu James    Exercises  Doorway Pec Stretch at 90 Degrees Abduction - 2 x daily - 7 x weekly - 2 sets - 10 reps  Seated Passive Cervical Retraction - 2 x daily - 7 x weekly - 2 sets - 10 reps  Shoulder External Rotation and Scapular Retraction - 2 x daily - 7 x weekly - 2 sets - 10 reps      Total Timed Treatment:     0   mins  Total Time of Visit:            70   mins    ASSESSMENT/PLAN   GOALS:  Goals                                                    Progress Note due by 8/28/21                                                                Recert due by 10/27/21   STG by: 3 weeks Comments Date Status   Improve mobility through thoracic and CT junction       Decreased muscle guarding  throughout neck and shoulder girdle       Reports no radicular symptoms for a week               LTG by: 6 weeks       Reports no exacerbation of headaches for a weeks       Improve cervical rotation [place] to WNL with increased pain      Report no neck/shoulder pain except occasional minor twinges no more than 1-2/10      Understands improved ergonomics for work and HEP for " flexibility and posture        Improve NDI score to 5% or less                     Assessment & Plan     Assessment  Impairments: abnormal muscle firing, abnormal muscle tone, abnormal or restricted ROM, activity intolerance, impaired physical strength, lacks appropriate home exercise program and pain with function  Assessment details: Her symptoms are consistent with the cervical strain that would be expected from her positioning her head forward and rotated as a dental assistant for that long. Her cervical straightening and the stiffness of the hyperkyphosis of her CT junction would make her hyperextend her upper neck creating the headaches. Today, I did a trial of dry needling and this abolished her headache and loosened her neck so we may pursue this. I think she can progress well with PT.   Prognosis: good  Functional Limitations: uncomfortable because of pain, sitting and unable to perform repetitive tasks  Plan  Therapy options: will be seen for skilled physical therapy services  Planned modality interventions: dry needling, low level laser therapy, TENS and traction  Planned therapy interventions: manual therapy, neuromuscular re-education, spinal/joint mobilization, home exercise program, body mechanics training, stretching, therapeutic activities, strengthening, soft tissue mobilization and postural training  Frequency: 2x week  Duration in visits: 12  Treatment plan discussed with: patient  Plan details: Focus early on pain relief with soft tissue work and other modalities, including potentially dry needling. Manual therapy used for mobilizations of the spine and upper thoracic and flexibility through the upper girdle. Progress with stability for posture and the shoulder girdle and progress HEP for the same.         PT SIGNATURE: Vasu James, PT       Initial Certification  Certification Period: 7/29/2021 through 10/27/2021  I certify that the therapy services are furnished while this patient is  under my care.  The services outlined above are required by this patient, and will be reviewed every 90 days.     PHYSICIAN:     Dali Rankin APRN______________________________________DATE: _________     Please sign and return via fax to [phone].   Thank you so much for letting us work with Allison. I appreciate your letting us work with your patients. If you have any questions or concerns, please don't hesitate to contact me.

## 2021-08-02 ENCOUNTER — TREATMENT (OUTPATIENT)
Dept: PHYSICAL THERAPY | Facility: CLINIC | Age: 46
End: 2021-08-02

## 2021-08-02 DIAGNOSIS — G44.209 TENSION HEADACHE: ICD-10-CM

## 2021-08-02 DIAGNOSIS — M79.18 CERVICAL MYOFASCIAL PAIN SYNDROME: ICD-10-CM

## 2021-08-02 DIAGNOSIS — M54.12 RADICULOPATHY, CERVICAL: Primary | ICD-10-CM

## 2021-08-02 PROCEDURE — 97032 APPL MODALITY 1+ESTIM EA 15: CPT | Performed by: PHYSICAL THERAPIST

## 2021-08-02 PROCEDURE — 97140 MANUAL THERAPY 1/> REGIONS: CPT | Performed by: PHYSICAL THERAPIST

## 2021-08-02 NOTE — PROGRESS NOTES
Physical Therapy Treatment Note    Patient: Allison Wilkins                                                                                     Visit Date: 2021  :     1975    Referring practitioner:    DEEPTI Melvin  Date of Initial Visit:          Type: THERAPY  Noted: 2021    Patient seen for 2 sessions    Visit Diagnoses:    ICD-10-CM ICD-9-CM   1. Radiculopathy, cervical  M54.12 723.4   2. Cervical myofascial pain syndrome  M79.18 729.1   3. Tension headache  G44.209 307.81     SUBJECTIVE     Subjective No changes or complaints since her initial evaluation. Today is her first day not working. She has gotten to a point where she no longer can perform her job d/t her neck pain and the position she has to hold her neck/head and therefore had to quit. Her daughter is a senior in  this year and her son is also in school, so she anticipates she will remain busy. She reports good results with DN, but her muscles were pretty sore following.     PAIN: 0/10 > 0/10     OBJECTIVE     Objective      Manual Therapy     01465  Comments   STM with massage cream, prone B UT and cervical paraspinals   Thoracic PA's, prone VERY hypomobile   Cervical T/D with intermittent side bends    Passive cervical rotation         Timed Minutes 34     Modalities Comments   Estim/cold laser combo, prone, DCI mode B UT and cervical paraspinals    tx completed as of today: 1   Minutes 10     Therapy Education/Self Care 26110   Details: Cold laser, POC   Given Home Exercise Program  [employer] HEP (access code [email or web address])  symptom relief   Progress: Reinforced   Education provided to:  Patient   Level of understanding Verbalized   Timed Minutes      Access Code: [email or web address]  Date: 2021  Prepared by: Vasu James     Exercises  Doorway Pec Stretch at 90 Degrees Abduction - 2 x daily - 7 x weekly - 2 sets - 10 reps  Seated Passive Cervical Retraction - 2 x daily - 7 x weekly - 2 sets - 10 reps  Shoulder External  Rotation and Scapular Retraction - 2 x daily - 7 x weekly - 2 sets - 10 reps    Total Timed Treatment:     44   mins  Total Time of Visit:             45   mins         ASSESSMENT/PLAN     GOALS  Goals                                                    Progress Note due by 8/28/21                                                                      Recert due by 10/27/21   STG by: 3 weeks Comments Date Last Assessed Status   Improve mobility through thoracic and CT junction     ongoing   Decreased muscle guarding  throughout neck and shoulder girdle  Very guarded throughout 8/2/2021 ongoing   Reports no radicular symptoms for a week     ongoing   LTG by: 6 weeks         Reports no exacerbation of headaches for a week She had a small HA this AM 8/2/2021 ongoing   Improve cervical rotation [place] to WNL with increased pain     ongoing   Report no neck/shoulder pain except occasional minor twinges no more than 1-2/10  0/10 today 8/2/2021 ongoing   Understands improved ergonomics for work and HEP for flexibility and posture     ongoing    Improve NDI score to 5% or less  9/50 (18%) on 7/29/2021 (initial eval)  7/29/2021 ongoing     Assessment/Plan     ASSESSMENT: No goals have been met at this time; however, this was her first session since her initial evaluation. Pt presents today reporting no pain, but did have a HA this AM. She also presented with some significant guarding, especially with cervical paraspinals, and was very hypomobile throughout her thoracic spine. She demonstrated full PROM today, thus indicating her ROM limitations are more so d/t muscle guarding and habits adopted d/t weakness vs truly limited ROM.     PLAN: Assess long term reaction to tx today and proceed accordingly. Continue to address hypomobility and soft tissue restrictions. Consider introducing light postural strengthening as well as chin tucks/unweighted or gravity-eliminated cervical rotation ROM. If she is interested, consider scheduling  an apt for DN. Reinforce her current HEP and consider bolstering as appropriate.     Signature: Katiana Birch, PTA

## 2021-08-03 ENCOUNTER — TELEPHONE (OUTPATIENT)
Dept: NEUROLOGY | Facility: CLINIC | Age: 46
End: 2021-08-03

## 2021-08-03 NOTE — TELEPHONE ENCOUNTER
Spoke with Ms. Cooper and she is doing well with her PT and discussed with her about the medication prescribed by DR. George. She understands and is very happy with the PT.

## 2021-08-05 ENCOUNTER — TREATMENT (OUTPATIENT)
Dept: PHYSICAL THERAPY | Facility: CLINIC | Age: 46
End: 2021-08-05

## 2021-08-05 DIAGNOSIS — M54.12 RADICULOPATHY, CERVICAL: Primary | ICD-10-CM

## 2021-08-05 DIAGNOSIS — M79.18 CERVICAL MYOFASCIAL PAIN SYNDROME: ICD-10-CM

## 2021-08-05 DIAGNOSIS — G44.209 TENSION HEADACHE: ICD-10-CM

## 2021-08-05 PROCEDURE — 97110 THERAPEUTIC EXERCISES: CPT | Performed by: PHYSICAL THERAPIST

## 2021-08-05 PROCEDURE — 97140 MANUAL THERAPY 1/> REGIONS: CPT | Performed by: PHYSICAL THERAPIST

## 2021-08-05 NOTE — PROGRESS NOTES
"Physical Therapy Treatment Note    Patient: Allison Wilkins                                                                                     Visit Date: 2021  :     1975    Referring practitioner:    DEEPTI Melvin  Date of Initial Visit:          Type: THERAPY  Noted: 2021    Patient seen for 3 sessions    Visit Diagnoses:    ICD-10-CM ICD-9-CM   1. Radiculopathy, cervical  M54.12 723.4   2. Cervical myofascial pain syndrome  M79.18 729.1   3. Tension headache  G44.209 307.81     SUBJECTIVE     Subjective She felt better following her last session. She had a HA this AM, but thinks this is contributed to stress surrounding her kid's starting school.     PAIN: 2/10 > 0-1/10     OBJECTIVE     Objective      Manual Therapy     99272  Comments   STM with massage cream, prone B UT cervical paraspinals    IASTM with blue ridge roller, prone  Thoracic paraspinals       Timed Minutes 15      Therapeutic Exercises    94923 Comments   B UE phasic against YTB  2 x 10    Horizontal abduction with YTB  2 x 10   HL \"I's\" with YTB  2 x 10   Ball presses with 45 cm physioball 2 x 10        Timed Minutes 30     Therapy Education/Self Care 71018   Details: Chin tucks, quality of movement for max benefit and reduced risk of spasm   Given Home Exercise Program  [employer] HEP (access code [email or web address])  symptom relief   Progress: Reinforced   Education provided to:  Patient   Level of understanding Verbalized   Timed Minutes      Access Code: [email or web address]  Date: 2021  Prepared by: Vasu James     Exercises  Doorway Pec Stretch at 90 Degrees Abduction - 2 x daily - 7 x weekly - 2 sets - 10 reps  Seated Passive Cervical Retraction - 2 x daily - 7 x weekly - 2 sets - 10 reps  Shoulder External Rotation and Scapular Retraction - 2 x daily - 7 x weekly - 2 sets - 10 reps    Total Timed Treatment:     45   mins  Total Time of Visit:             45   mins         ASSESSMENT/PLAN     GOALS  Goals                    " "                                Progress Note due by 8/28/21                                                                      Recert due by 10/27/21   STG by: 3 weeks Comments Date Last Assessed Status   Improve mobility through thoracic and CT junction     ongoing   Decreased muscle guarding  throughout neck and shoulder girdle  Very guarded throughout 8/2/2021 ongoing   Reports no radicular symptoms for a week She has not had any finger pain or radicular symptoms since April. She will occasionally have \"discomfort down her shoulder blades\" but improves following tx with chiro 8/5/2021 met   LTG by: 6 weeks         Reports no exacerbation of headaches for a week She had a small HA this AM 8/2/2021 ongoing   Improve cervical rotation [place] to WNL with increased pain     ongoing   Report no neck/shoulder pain except occasional minor twinges no more than 1-2/10  0/10 today 8/2/2021 ongoing   Understands improved ergonomics for work and HEP for flexibility and posture Reinforced today and introduced chin tucks 8/5/2021 ongoing    Improve NDI score to 5% or less  9/50 (18%) on 7/29/2021 (initial eval)  7/29/2021 ongoing     Assessment/Plan     ASSESSMENT: She reports good results following her last session, but does have increased pain today; however, she feels this is primarily d/t stress from her kids starting school today. We introduced several postural exercises today with light resistance, to which she tolerated well. She performed B UE phasic especially well; however, throughout her exercises, she required frequent verbal cues d/t speed of movement. She also met one of her goals today.     PLAN: Reinforce light postural strengthening as well as chin tucks/unweighted or gravity-eliminated cervical rotation ROM. Reinforce her current HEP and consider bolstering to reflect chin tucks as appropriate.     Signature: Katiana Birch, PTA      "

## 2021-08-30 ENCOUNTER — TREATMENT (OUTPATIENT)
Dept: PHYSICAL THERAPY | Facility: CLINIC | Age: 46
End: 2021-08-30

## 2021-08-30 DIAGNOSIS — M54.12 RADICULOPATHY, CERVICAL: Primary | ICD-10-CM

## 2021-08-30 DIAGNOSIS — G44.209 TENSION HEADACHE: ICD-10-CM

## 2021-08-30 DIAGNOSIS — M79.18 CERVICAL MYOFASCIAL PAIN SYNDROME: ICD-10-CM

## 2021-08-30 PROCEDURE — 97140 MANUAL THERAPY 1/> REGIONS: CPT | Performed by: PHYSICAL THERAPIST

## 2021-08-30 NOTE — PROGRESS NOTES
"Physical Therapy Treatment Note and 30 Day Progress Note    Patient: Allison Wilkins                                                                                     Visit Date: 2021  :     1975    Referring practitioner:    DEEPTI Melvin  Date of Initial Visit:          Type: THERAPY  Noted: 2021    Patient seen for 4 sessions    Visit Diagnoses:    ICD-10-CM ICD-9-CM   1. Radiculopathy, cervical  M54.12 723.4   2. Cervical myofascial pain syndrome  M79.18 729.1   3. Tension headache  G44.209 307.81     SUBJECTIVE     Subjective She had a really bad migraine last week where she was throwing up and thought she was going to have to go to the hospital, but is unsure if this is d/t being unable to go to the chiropractor. Her  had to come home from work because she was on the floor in the bathroom, \"I thought I was dying\". She feels improved, but limited by emotional stressors and COVID.     PAIN: 0-1/10 > 0/10     OBJECTIVE     Objective      Manual Therapy     53952  Comments   STM with massage cream, prone B UT, LS,  cervical paraspinals    IASTM with blue ridge roller, prone  Thoracic paraspinals   Thoracic PA's in prone Gr 3   Cervical T/D with intermittent suboccipital release    Cervical passive SB stretch    Cervical PA's and side glides  Hypomobile throughout       Timed Minutes 40     Therapeutic Exercises    87582 Comments   Cervical rotation AROM  R: 62 deg and L: 43 deg AROM  - cues to prevent compensation with SB and extension       Timed Minutes 2     Therapy Education/Self Care 85598   Details: Reviewed POC   Given Home Exercise Program  [employer] HEP (access code [email or web address])  symptom relief   Progress: Reinforced   Education provided to:  Patient   Level of understanding Verbalized   Timed Minutes      Access Code: [email or web address]  Date: 2021  Prepared by: Vasu James     Exercises  Doorway Pec Stretch at 90 Degrees Abduction - 2 x daily - 7 x weekly - 2 sets - 10 " "reps  Seated Passive Cervical Retraction - 2 x daily - 7 x weekly - 2 sets - 10 reps  Shoulder External Rotation and Scapular Retraction - 2 x daily - 7 x weekly - 2 sets - 10 reps    Total Timed Treatment:     45   mins  Total Time of Visit:             45   mins         ASSESSMENT/PLAN     GOALS  Goals                                                    Progress Note due by 9/29/21                                                                      Recert due by 10/27/21   STG by: 3 weeks Comments Date Last Assessed Status   Improve mobility through thoracic and CT junction  improved throughout thoracic spine and cervical spine. Sees chiro tomorrow 8/30/2021 ongoing   Decreased muscle guarding  throughout neck and shoulder girdle Significant guarding throughout cervical paraspinals/UT, appears to be improving 8/30/2021 ongoing   Reports no radicular symptoms for a week She has not had any finger pain or radicular symptoms since April. She will occasionally have \"discomfort down her shoulder blades\" but improves following tx with chiro 8/5/2021 met   LTG by: 6 weeks         Reports no exacerbation of headaches for a week Reports really bad migraine last week where she was throwing up and thought she was going to have to go to the hospital, but is unsure if this is d/t being unable to go to the chiropractor 8/30/2021 ongoing   Improve cervical rotation [place] to WNL with increased pain  R: 62 deg and L: 43 deg AROM  8/30/2021 ongoing   Report no neck/shoulder pain except occasional minor twinges no more than 1-2/10 Last flare up last Wed-Thurs with pain being 10/10. 0-1/10 today.  8/30/2021 ongoing   Understands improved ergonomics for work and HEP for flexibility and posture She was compliant 2x/day prior to dx of COVID 8/30/2021 ongoing    Improve NDI score to 5% or less  9/50 (18%) on 7/29/2021 (initial eval)  16/50 (32%) on 8/30/2021 8/30/2021 ongoing     Assessment/Plan     ASSESSMENT: Patient presents today " following a brief hiatus d/t the pandemic. As of this date, she has met one of her seven goals and is progressing well towards her remaining six goals. She reports recent flare-up (last week) which required her  to come home from work to care for her. Her cervical AROM was a little more restricted today vs initial evaluation (43 deg vs 60 deg), but she feels stiffer today as she has not recently been to the chiropractor (apt scheduled for tomorrow). Pt reports feeling improvements with PT, but has regressed slightly d/t inability to attend PT, which is supported by the data today. Therefore, it is imperative that pt continue to attend skilled PT 2x/week for the following 4 weeks to continue progressing her core and postural strength, reinforcing and educating on body mechanics, postural awareness, and neutral head position, and addressing her spinal, joint, and muscle restrictions to improve her quality of life and independence with ADLs.     PLAN: Progress light postural and core strengthening as tolerated, bolstering HEP to reflect as appropriate. If she feels mobility improves following chiro > PT, consider focusing more on her postural strength and guarding as needed; however, continue if needed. Continue to reinforce good body mechanics and postural awareness. Consider educating and assessing her car seat positioning, and address as needed.    Signature: Katiana Birch, PTA

## 2021-08-30 NOTE — PROGRESS NOTES
Progress Note Addendum      Patient: Allison Wilkins           : 1975  Visit Date: 2021  Referring practitioner: DEEPTI Melvin  Date of Initial Visit: Type: THERAPY  Noted: 2021  Patient seen for 4 sessions  Visit Diagnoses:    ICD-10-CM ICD-9-CM   1. Radiculopathy, cervical  M54.12 723.4   2. Cervical myofascial pain syndrome  M79.18 729.1   3. Tension headache  G44.209 307.81       PT G-Codes  Outcome Measure Options: Neck Disability Index (NDI)  Neck Disability Index Score: 16  Clinical Progress: improved  Home Program Compliance: Yes  Progress toward previous goals: Partially Met  Prognosis to achieve goals: good    Objective     Assessment & Plan     Assessment  Impairments: abnormal muscle firing, abnormal muscle tone, abnormal or restricted ROM, activity intolerance, impaired physical strength, lacks appropriate home exercise program and pain with function  Prognosis: good  Functional Limitations: uncomfortable because of pain, sitting and unable to perform repetitive tasks  Plan  Therapy options: will be seen for skilled physical therapy services  Planned modality interventions: dry needling, low level laser therapy, TENS and traction  Planned therapy interventions: manual therapy, neuromuscular re-education, spinal/joint mobilization, home exercise program, body mechanics training, stretching, therapeutic activities, strengthening, soft tissue mobilization and postural training  Frequency: 2x week  Duration in visits: 8  Treatment plan discussed with: SEAN        I have reviewed the progress note information provided by Katiana Birch PTA, and I concur with the findings.    Vasu James, PT  Physical Therapist

## 2021-09-08 ENCOUNTER — TREATMENT (OUTPATIENT)
Dept: PHYSICAL THERAPY | Facility: CLINIC | Age: 46
End: 2021-09-08

## 2021-09-08 DIAGNOSIS — G44.209 TENSION HEADACHE: ICD-10-CM

## 2021-09-08 DIAGNOSIS — M79.18 CERVICAL MYOFASCIAL PAIN SYNDROME: ICD-10-CM

## 2021-09-08 DIAGNOSIS — M54.12 RADICULOPATHY, CERVICAL: Primary | ICD-10-CM

## 2021-09-08 PROCEDURE — 97110 THERAPEUTIC EXERCISES: CPT | Performed by: PHYSICAL THERAPIST

## 2021-09-08 PROCEDURE — 97140 MANUAL THERAPY 1/> REGIONS: CPT | Performed by: PHYSICAL THERAPIST

## 2021-09-08 NOTE — PROGRESS NOTES
"Physical Therapy Treatment Note    Patient: Allison Wilkins                                                                                     Visit Date: 2021  :     1975    Referring practitioner:    DEEPTI Melvin  Date of Initial Visit:          Type: THERAPY  Noted: 2021    Patient seen for 5 sessions    Visit Diagnoses:    ICD-10-CM ICD-9-CM   1. Radiculopathy, cervical  M54.12 723.4   2. Cervical myofascial pain syndrome  M79.18 729.1   3. Tension headache  G44.209 307.81     SUBJECTIVE     Subjective She had a HA last night and this AM. Reports chiro says he can tell she is improved.     PAIN: 3-4/10 > 1-2/10     OBJECTIVE     Objective      Manual Therapy     19163  Comments   STM  And DMR with massage cream B UT, LS (R > L),  cervical paraspinals    Cervical T/D with suboccipital release    Cervical passive SB stretch WNL       Timed Minutes 25     Therapeutic Exercises    38540 Comments   Chin tucks in seated and supine Continues to need reinforcement to avoid cervical protrusion and other compensations   Supine horizontal abduction with YTB  2 x 10 -- added to HEP   HL \"I's\" with YTB  2 x 10    Standing rows at shuttle press with YTB  2 x 10 -- added to HEP       Timed Minutes 20     Therapy Education/Self Care 28835   Details: See below   Given Home Exercise Program  [employer] HEP (access code [email or web address])  postural retraining   Progress: New and Reinforced   Education provided to:  Patient   Level of understanding Verbalized and Demonstrated   Timed Minutes      Access Code: [email or web address]  Date: 2021  Prepared by: Katiana Birch    Exercises  Doorway Pec Stretch at 90 Degrees Abduction - 2 x daily - 7 x weekly - 2 sets - 10 reps  Seated Passive Cervical Retraction - 2 x daily - 7 x weekly - 2 sets - 10 reps  Shoulder External Rotation and Scapular Retraction - 2 x daily - 7 x weekly - 2 sets - 10 reps  Supine Shoulder Horizontal Abduction with Resistance - 1 x daily - 7 x " "weekly - 2 sets - 10 reps  Standing Row with Anchored Resistance - 1 x daily - 7 x weekly - 2 sets - 10 reps    Total Timed Treatment:     45   mins  Total Time of Visit:             43   mins         ASSESSMENT/PLAN     GOALS  Goals                                                    Progress Note due by 9/29/21                                                                      Recert due by 10/27/21   STG by: 3 weeks Comments Date Last Assessed Status   Improve mobility through thoracic and CT junction  improved throughout thoracic spine and cervical spine. Sees chiro tomorrow 8/30/2021 ongoing   Decreased muscle guarding  throughout neck and shoulder girdle Guarded throughout suboccipitals and cervical paraspinals but lui R LS.  9/8/2021 ongoing   Reports no radicular symptoms for a week She has not had any finger pain or radicular symptoms since April. She will occasionally have \"discomfort down her shoulder blades\" but improves following tx with chiro 8/5/2021 met   LTG by: 6 weeks         Reports no exacerbation of headaches for a week Reports really bad migraine last week where she was throwing up and thought she was going to have to go to the hospital, but is unsure if this is d/t being unable to go to the chiropractor 8/30/2021 ongoing   Improve cervical rotation [place] to WNL with increased pain  R: 62 deg and L: 43 deg AROM  8/30/2021 ongoing   Report no neck/shoulder pain except occasional minor twinges no more than 1-2/10 Last flare up last Wed-Thurs with pain being 10/10. 0-1/10 today.  8/30/2021 ongoing   Understands improved ergonomics for work and HEP for flexibility and posture She was compliant 2x/day prior to dx of COVID 8/30/2021 ongoing    Improve NDI score to 5% or less  9/50 (18%) on 7/29/2021 (initial eval)  16/50 (32%) on 8/30/2021 8/30/2021 ongoing     Assessment/Plan     ASSESSMENT: Patient presents today following another night with a HA, which persist today; however, it did improve " following session. We discussed DN for this as she had noticed improvements in the frequency of HA prior to dx of COVID. She demonstrated guarding throughout her cervical paraspinals and suboccipitals, but was lui guarded in R levator scapulae, which improved following manual therapies today.    PLAN: Continue to address mobility and guarding as needed. Progress postural strengthening with light resistance as able, bolstering her HEP to reflect as appropriate.     Signature: Katiana Birch, PTA

## 2021-09-10 ENCOUNTER — TREATMENT (OUTPATIENT)
Dept: PHYSICAL THERAPY | Facility: CLINIC | Age: 46
End: 2021-09-10

## 2021-09-10 DIAGNOSIS — G44.209 TENSION HEADACHE: ICD-10-CM

## 2021-09-10 DIAGNOSIS — M54.12 RADICULOPATHY, CERVICAL: Primary | ICD-10-CM

## 2021-09-10 DIAGNOSIS — M79.18 CERVICAL MYOFASCIAL PAIN SYNDROME: ICD-10-CM

## 2021-09-10 PROCEDURE — 97140 MANUAL THERAPY 1/> REGIONS: CPT | Performed by: PHYSICAL THERAPIST

## 2021-09-10 NOTE — PROGRESS NOTES
Physical Therapy Treatment Note    Patient: Allison Wilkins                                                                                     Visit Date: 9/10/2021  :     1975    Referring practitioner:    DEEPTI Melvin  Date of Initial Visit:          Type: THERAPY  Noted: 2021    Patient seen for 6 sessions    Visit Diagnoses:    ICD-10-CM ICD-9-CM   1. Radiculopathy, cervical  M54.12 723.4   2. Cervical myofascial pain syndrome  M79.18 729.1   3. Tension headache  G44.209 307.81     SUBJECTIVE     Subjective She had a HA last night and this AM. Reports chiro says he can tell she is improved.     PAIN: 3-4/10 > 1-2/10     OBJECTIVE     Objective      Manual Therapy     91173  Comments   Prone [place] UT TrP release    Prone CT ext/rot mob Gr 3 sustained   Prone [place] C1 PA Gr 3 sustained   Prone mid cerv midline PA Gr 3 repetitive   Supine mid to upper facet Closing mobs   Supine left C1 right SG mob sustained   subocc release and sustained man cervical traction            Timed Minutes 45       Therapy Education/Self Care 46239   Details: See below   Given Home Exercise Program  [employer] HEP (access code [email or web address])  postural retraining   Progress: New and Reinforced   Education provided to:  Patient   Level of understanding Verbalized and Demonstrated   Timed Minutes      Access Code: [email or web address]  Date: 2021  Prepared by: Katiana Birch    Exercises  Doorway Pec Stretch at 90 Degrees Abduction - 2 x daily - 7 x weekly - 2 sets - 10 reps  Seated Passive Cervical Retraction - 2 x daily - 7 x weekly - 2 sets - 10 reps  Shoulder External Rotation and Scapular Retraction - 2 x daily - 7 x weekly - 2 sets - 10 reps  Supine Shoulder Horizontal Abduction with Resistance - 1 x daily - 7 x weekly - 2 sets - 10 reps  Standing Row with Anchored Resistance - 1 x daily - 7 x weekly - 2 sets - 10 reps    Total Timed Treatment:     45   mins  Total Time of Visit:             43   mins        "  ASSESSMENT/PLAN     GOALS  Goals                                                    Progress Note due by 9/29/21                                                                      Recert due by 10/27/21   STG by: 3 weeks Comments Date Last Assessed Status   Improve mobility through thoracic and CT junction  improved throughout thoracic spine and cervical spine. Sees chiro tomorrow 8/30/2021 ongoing   Decreased muscle guarding  throughout neck and shoulder girdle Guarded throughout suboccipitals and cervical paraspinals but lui R LS.  9/10/2021 ongoing   Reports no radicular symptoms for a week She has not had any finger pain or radicular symptoms since April. She will occasionally have \"discomfort down her shoulder blades\" but improves following tx with chiro 8/5/2021 met   LTG by: 6 weeks         Reports no exacerbation of headaches for a week Reports really bad migraine last week where she was throwing up and thought she was going to have to go to the hospital, but is unsure if this is d/t being unable to go to the chiropractor 8/30/2021 ongoing   Improve cervical rotation [place] to WNL with increased pain  R: 62 deg and L: 43 deg AROM  8/30/2021 ongoing   Report no neck/shoulder pain except occasional minor twinges no more than 1-2/10 Last flare up last Wed-Thurs with pain being 10/10. 0-1/10 today.  8/30/2021 ongoing   Understands improved ergonomics for work and HEP for flexibility and posture She was compliant 2x/day prior to dx of COVID 8/30/2021 ongoing    Improve NDI score to 5% or less  9/50 (18%) on 7/29/2021 (initial eval)  16/50 (32%) on 8/30/2021 8/30/2021 ongoing     Assessment/Plan     ASSESSMENT: her upper cervical is still malaligned. I worked on trying to mobilize through her upper neck and C1. It's been like this for a long time so I'm sure it will take time to loosen.     PLAN: Cont to focus on manual therapy to loosen CT area and upper cervical. Work on PA's to mid cervical to improve the " lordosis here to help take some of the strain off her upper neck.     Signature: Vasu James, PT

## 2021-09-13 ENCOUNTER — TREATMENT (OUTPATIENT)
Dept: PHYSICAL THERAPY | Facility: CLINIC | Age: 46
End: 2021-09-13

## 2021-09-13 DIAGNOSIS — M54.12 RADICULOPATHY, CERVICAL: Primary | ICD-10-CM

## 2021-09-13 DIAGNOSIS — G44.209 TENSION HEADACHE: ICD-10-CM

## 2021-09-13 DIAGNOSIS — M79.18 CERVICAL MYOFASCIAL PAIN SYNDROME: ICD-10-CM

## 2021-09-13 PROCEDURE — 97110 THERAPEUTIC EXERCISES: CPT | Performed by: PHYSICAL THERAPIST

## 2021-09-13 PROCEDURE — 97140 MANUAL THERAPY 1/> REGIONS: CPT | Performed by: PHYSICAL THERAPIST

## 2021-09-13 NOTE — PROGRESS NOTES
"Physical Therapy Treatment Note    Patient: Allison Wilkins                                                                                     Visit Date: 2021  :     1975    Referring practitioner:    DEEPTI Melvin  Date of Initial Visit:          Type: THERAPY  Noted: 2021    Patient seen for 7 sessions    Visit Diagnoses:    ICD-10-CM ICD-9-CM   1. Radiculopathy, cervical  M54.12 723.4   2. Cervical myofascial pain syndrome  M79.18 729.1   3. Tension headache  G44.209 307.81     SUBJECTIVE     Subjective She reports no new changes or complaints. She says her HA is \"more of a nuisance\" this AM. 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    PAIN: 2-3/10 > 0/10     OBJECTIVE     Objective      Manual Therapy     [id number]  Comments   STM with massage cream, prone B UT, cervical paraspinals, LS   Cervical T/D in HL with intermittent sustained suboccipital release Gr 3    Cervical PA's and side glides Gr 2-3       Timed Minutes 35     Therapeutic Exercises    13375 Comments   B UE phasic with YTB  2 x 10   B SA punches with 1 lb DB 2 x 10 ea -- cont reinforcement needed       Timed Minutes 9     Therapy Education/Self Care 00921   Details:    Given Home Exercise Program  [employer] HEP (access code [email or web address])  postural retraining   Progress: Reinforced   Education provided to:  Patient   Level of understanding Verbalized and Demonstrated   Timed Minutes      Access Code: [email or web address]  Date: 2021  Prepared by: Katiana Neal  Doorway Pec Stretch at 90 Degrees Abduction - 2 x daily - 7 x weekly - 2 sets - 10 reps  Seated Passive Cervical Retraction - 2 x daily - 7 x weekly - 2 sets - 10 reps  Shoulder External Rotation and Scapular Retraction - 2 x daily - 7 x weekly - 2 sets - 10 reps  Supine Shoulder Horizontal Abduction with Resistance - 1 x daily - 7 x weekly - 2 sets - 10 reps  Standing Row with Anchored Resistance - 1 x daily - 7 x weekly - 2 sets - 10 reps    Total " "Timed Treatment:     44   mins  Total Time of Visit:             45   mins         ASSESSMENT/PLAN     GOALS  Goals                                                    Progress Note due by 9/29/21                                                                      Recert due by 10/27/21   STG by: 3 weeks Comments Date Last Assessed Status   Improve mobility through thoracic and CT junction Continues to be guarded throughout 9/13/2021 ongoing   Decreased muscle guarding  throughout neck and shoulder girdle Guarded throughout suboccipitals and cervical paraspinals but lui R LS.  9/10/2021 ongoing   Reports no radicular symptoms for a week She has not had any finger pain or radicular symptoms since April. She will occasionally have \"discomfort down her shoulder blades\" but improves following tx with chiro 8/5/2021 met   LTG by: 6 weeks         Reports no exacerbation of headaches for a week Reports really bad migraine last week where she was throwing up and thought she was going to have to go to the hospital, but is unsure if this is d/t being unable to go to the chiropractor 8/30/2021 ongoing   Improve cervical rotation [place] to WNL with increased pain  R: 62 deg and L: 43 deg AROM  8/30/2021 ongoing   Report no neck/shoulder pain except occasional minor twinges no more than 1-2/10 Last flare up last Wed-Thurs with pain being 10/10. 0-1/10 today.  8/30/2021 ongoing   Understands improved ergonomics for work and HEP for flexibility and posture She was compliant 2x/day prior to dx of COVID 8/30/2021 ongoing    Improve NDI score to 5% or less  9/50 (18%) on 7/29/2021 (initial eval)  16/50 (32%) on 8/30/2021 8/30/2021 ongoing     Assessment/Plan     ASSESSMENT: We focused on her cervical mobility, which continues to be limited and introduced strengthening for her serratus anterior. She demonstrated difficulty with this (R > L), but was like exacerbated by difficulty with procedure vs weakness; however, she did demonstrate " weakness on R as well. Pain absolved by EOS, per pt report.     PLAN: Cont to focus on manual therapy to loosen cervical spine with mobilizations and STM. Continue to progress postural strengthening and consider SA punches in standing vs supine. Progress resistance to RTB if appropriate.      Signature: Katiana Birch, PTA

## 2021-09-15 ENCOUNTER — TELEPHONE (OUTPATIENT)
Dept: NEUROLOGY | Facility: CLINIC | Age: 46
End: 2021-09-15

## 2021-09-15 ENCOUNTER — TREATMENT (OUTPATIENT)
Dept: PHYSICAL THERAPY | Facility: CLINIC | Age: 46
End: 2021-09-15

## 2021-09-15 DIAGNOSIS — M54.12 RADICULOPATHY, CERVICAL: Primary | ICD-10-CM

## 2021-09-15 DIAGNOSIS — G44.209 TENSION HEADACHE: ICD-10-CM

## 2021-09-15 DIAGNOSIS — M79.18 CERVICAL MYOFASCIAL PAIN SYNDROME: ICD-10-CM

## 2021-09-15 PROCEDURE — 97140 MANUAL THERAPY 1/> REGIONS: CPT | Performed by: PHYSICAL THERAPIST

## 2021-09-15 PROCEDURE — 97110 THERAPEUTIC EXERCISES: CPT | Performed by: PHYSICAL THERAPIST

## 2021-09-15 NOTE — PROGRESS NOTES
Physical Therapy Treatment Note    Patient: Allison Wilkins                                                                                     Visit Date: 9/15/2021  :     1975    Referring practitioner:    DEEPTI Melvin  Date of Initial Visit:          Type: THERAPY  Noted: 2021    Patient seen for 8 sessions    Visit Diagnoses:    ICD-10-CM ICD-9-CM   1. Radiculopathy, cervical  M54.12 723.4   2. Cervical myofascial pain syndrome  M79.18 729.1   3. Tension headache  G44.209 307.81     SUBJECTIVE     Subjective She had a migraine at 1:30 this morning and took an amitrex and went back to sleep. Biofreeze on her neck and peppermint oil helped. She had a chiropractor adjustment. She just has a nagging pain in her neck (R > L) and a HA right now.     PAIN: 3-4/10 > 0-1/10     OBJECTIVE     Objective      Manual Therapy     [id number]  Comments   STM with massage cream, prone B UT, cervical paraspinals, LS   Prone CT and lower cervical PA mobs            Timed Minutes 30     Therapeutic Exercises    27954 Comments   Supine towel roll pec stretch     B SL ER 2x10   B standing SA punches w/ back against wall 2x10   B ER isometric walkouts w/ yellow can-do 2x10           Timed Minutes 15     Therapy Education/Self Care 53881   Details:    Given Home Exercise Program  [employer] HEP (access code [email or web address])  postural retraining   Progress: Reinforced   Education provided to:  Patient   Level of understanding Verbalized and Demonstrated   Timed Minutes      Access Code: [email or web address]  Date: 2021  Prepared by: Katiana Birch    Exercises  Doorway Pec Stretch at 90 Degrees Abduction - 2 x daily - 7 x weekly - 2 sets - 10 reps  Seated Passive Cervical Retraction - 2 x daily - 7 x weekly - 2 sets - 10 reps  Shoulder External Rotation and Scapular Retraction - 2 x daily - 7 x weekly - 2 sets - 10 reps  Supine Shoulder Horizontal Abduction with Resistance - 1 x daily - 7 x weekly - 2 sets - 10 reps  Standing Row  "with Anchored Resistance - 1 x daily - 7 x weekly - 2 sets - 10 reps    Total Timed Treatment:     45   mins  Total Time of Visit:             45   mins         ASSESSMENT/PLAN     GOALS  Goals                                                    Progress Note due by 9/29/21                                                                      Recert due by 10/27/21   STG by: 3 weeks Comments Date Last Assessed Status   Improve mobility through thoracic and CT junction Continues to be guarded throughout 9/13/2021 ongoing   Decreased muscle guarding  throughout neck and shoulder girdle Guarded throughout suboccipitals and cervical paraspinals but lui R LS.  9/10/2021 ongoing   Reports no radicular symptoms for a week She has not had any finger pain or radicular symptoms since April. She will occasionally have \"discomfort down her shoulder blades\" but improves following tx with chiro 8/5/2021 met   LTG by: 6 weeks         Reports no exacerbation of headaches for a week Reports really bad migraine last week where she was throwing up and thought she was going to have to go to the hospital, but is unsure if this is d/t being unable to go to the chiropractor 8/30/2021 ongoing   Improve cervical rotation [place] to WNL with increased pain  R: 62 deg and L: 43 deg AROM  8/30/2021 ongoing   Report no neck/shoulder pain except occasional minor twinges no more than 1-2/10 Last flare up last Wed-Thurs with pain being 10/10. 0-1/10 today.  8/30/2021 ongoing   Understands improved ergonomics for work and HEP for flexibility and posture She was compliant 2x/day prior to dx of COVID 8/30/2021 ongoing    Improve NDI score to 5% or less  9/50 (18%) on 7/29/2021 (initial eval)  16/50 (32%) on 8/30/2021 8/30/2021 ongoing     Assessment/Plan     ASSESSMENT: She continues to present w/ guarding surrounding her cervical musculature and is restricted in her proximal spine. We worked to gently progress postural strengthening as well today. She " required increased tactile cueing to combat UT activation and promote correct form for exercises.     PLAN: Consider prone exercises next visit. Continue to prioritize mobility of her proximal spine and postural strengthening.     Signature: Nancy Goldstein, PTA

## 2021-09-15 NOTE — TELEPHONE ENCOUNTER
Provider: TRESSA PEREZ    Caller: LIN    Relationship to Patient: PHARMACY    Phone Number: [phone]    REF# [id number]    Reason for Call: PHARMACY IS CALLING DUE TO PRIOR AUTH BEING DENIED AND IS ASKING IF [facility]  SHE SAID THE PRIOR AUTH CAN BE RE-INITIATED MANUALLY IF NEEDED.

## 2021-09-20 ENCOUNTER — TREATMENT (OUTPATIENT)
Dept: PHYSICAL THERAPY | Facility: CLINIC | Age: 46
End: 2021-09-20

## 2021-09-20 DIAGNOSIS — M79.18 CERVICAL MYOFASCIAL PAIN SYNDROME: ICD-10-CM

## 2021-09-20 DIAGNOSIS — G44.209 TENSION HEADACHE: ICD-10-CM

## 2021-09-20 DIAGNOSIS — M54.12 RADICULOPATHY, CERVICAL: Primary | ICD-10-CM

## 2021-09-20 PROCEDURE — 97140 MANUAL THERAPY 1/> REGIONS: CPT | Performed by: PHYSICAL THERAPIST

## 2021-09-20 PROCEDURE — 97110 THERAPEUTIC EXERCISES: CPT | Performed by: PHYSICAL THERAPIST

## 2021-09-20 NOTE — PROGRESS NOTES
"Physical Therapy Treatment Note    Patient: Allison Wilkins                                                                                     Visit Date: 2021  :     1975    Referring practitioner:    DEEPTI Melvin  Date of Initial Visit:          Type: THERAPY  Noted: 2021    Patient seen for 9 sessions    Visit Diagnoses:    ICD-10-CM ICD-9-CM   1. Radiculopathy, cervical  M54.12 723.4   2. Cervical myofascial pain syndrome  M79.18 729.1   3. Tension headache  G44.209 307.81     SUBJECTIVE     Subjective She reports she \"tweaked her back\" this weekend on the R. She turned to the side in her bed trying to get comfortable, and twisted it too far. She reports if it doesn't get better, she will seek out chiropractic care. She has a HA on the R as well.     PAIN: 3/10 > 1-2/10     OBJECTIVE     Objective      Manual Therapy     [id number]  Comments   STM with massage cream, prone B UT (L > R) -- very guarded, TP on L       Timed Minutes 10     Therapeutic Exercises    52893 Comments   Assessed pelvic alignment in supine and standing R anterior winging   B UE phasic against YTB  2 x 10    MET for R anterior winging Improved but min rot on R remained post-MET       Timed Minutes 15     Therapy Education/Self Care 79742   Details: Pelvic alignment, head righting reaction   Given Home Exercise Program  [employer] HEP (access code [email or web address])  postural retraining   Progress: Reinforced   Education provided to:  Patient   Level of understanding Verbalized   Timed Minutes      Access Code: [email or web address]  Date: 2021  Prepared by: Katiana Neal  Doorway Pec Stretch at 90 Degrees Abduction - 2 x daily - 7 x weekly - 2 sets - 10 reps  Seated Passive Cervical Retraction - 2 x daily - 7 x weekly - 2 sets - 10 reps  Shoulder External Rotation and Scapular Retraction - 2 x daily - 7 x weekly - 2 sets - 10 reps  Supine Shoulder Horizontal Abduction with Resistance - 1 x daily - 7 x weekly - 2 sets " "- 10 reps  Standing Row with Anchored Resistance - 1 x daily - 7 x weekly - 2 sets - 10 reps    Total Timed Treatment:     25   mins  Total Time of Visit:             45   mins         ASSESSMENT/PLAN     GOALS  Goals                                                    Progress Note due by 9/29/21                                                                      Recert due by 10/27/21   STG by: 3 weeks Comments Date Last Assessed Status   Improve mobility through thoracic and CT junction Continues to be guarded throughout 9/13/2021 ongoing   Decreased muscle guarding  throughout neck and shoulder girdle Guarded throughout suboccipitals and cervical paraspinals but lui R LS.  9/10/2021 ongoing   Reports no radicular symptoms for a week She has not had any finger pain or radicular symptoms since April. She will occasionally have \"discomfort down her shoulder blades\" but improves following tx with chiro 8/5/2021 met   LTG by: 6 weeks         Reports no exacerbation of headaches for a week HA thi AM on R 9/20/2021 ongoing   Improve cervical rotation [place] to WNL with increased pain  R: 62 deg and L: 43 deg AROM  8/30/2021 ongoing   Report no neck/shoulder pain except occasional minor twinges no more than 1-2/10 Last flare up last Wed-Thurs with pain being 10/10. 0-1/10 today.  8/30/2021 ongoing   Understands improved ergonomics for work and HEP for flexibility and posture She was compliant 2x/day prior to dx of COVID 8/30/2021 ongoing    Improve NDI score to 5% or less  9/50 (18%) on 7/29/2021 (initial eval)  16/50 (32%) on 8/30/2021 8/30/2021 ongoing     Assessment/Plan     ASSESSMENT: Treatment time was truncated d/t pt phone call with unemployment/new job today as this was the only time available for them to call. Due to head righting reaction, pelvic alignment was assessed in supine and standing to r/o functional scoliosis and revealed a R anteriorly shifted iliac crest. Reported good result following and reports " she may follow-up with her chiropractor.     PLAN: Consider prone exercises next visit and continue to focus on mobility of her proximal spine and postural strengthening. Consider progressing strengthening to RTB if appropriate.     Signature: Katiana Birch PTA

## 2021-09-21 ENCOUNTER — TELEPHONE (OUTPATIENT)
Dept: NEUROLOGY | Facility: CLINIC | Age: 46
End: 2021-09-21

## 2021-09-22 ENCOUNTER — TREATMENT (OUTPATIENT)
Dept: PHYSICAL THERAPY | Facility: CLINIC | Age: 46
End: 2021-09-22

## 2021-09-22 DIAGNOSIS — M79.18 CERVICAL MYOFASCIAL PAIN SYNDROME: ICD-10-CM

## 2021-09-22 DIAGNOSIS — M54.12 RADICULOPATHY, CERVICAL: Primary | ICD-10-CM

## 2021-09-22 DIAGNOSIS — G44.209 TENSION HEADACHE: ICD-10-CM

## 2021-09-22 PROCEDURE — 97110 THERAPEUTIC EXERCISES: CPT | Performed by: PHYSICAL THERAPIST

## 2021-09-22 PROCEDURE — 97140 MANUAL THERAPY 1/> REGIONS: CPT | Performed by: PHYSICAL THERAPIST

## 2021-09-22 NOTE — PROGRESS NOTES
Physical Therapy Treatment Note    Patient: Allison Wilkins                                                                                     Visit Date: 2021  :     1975    Referring practitioner:    No ref. provider found  Date of Initial Visit:          Type: THERAPY  Noted: 2021    Patient seen for 10 sessions    Visit Diagnoses:    ICD-10-CM ICD-9-CM   1. Radiculopathy, cervical  M54.12 723.4   2. Cervical myofascial pain syndrome  M79.18 729.1   3. Tension headache  G44.209 307.81     SUBJECTIVE     Subjective Her hip feels better today than at last session. Her neck feels okay. She had a little head ache this morning but it's gone. Her headaches primarily occur in the morning and resolve as the day passes. Her R neck is always sore. She had to take medicine around 1:30 am as precaution.     PAIN: 1-2/10 > 0/10     OBJECTIVE     Objective      Manual Therapy     [id number]  Comments   STM with massage cream to B UT/LS and suboccipitals    Prone throracic extension mobilizations        Timed Minutes 20     Therapeutic Exercises    96327 Comments   Prone scapular retractions  2x10   Prone Ts 2x10   HL horizontal abd against red TB  2x10   HL Ys against yellow TB 2x10   B SL openbooks against yellow TB 2x10   HL PPT 2x10   Seated UE phasic against yellow TB, tongue depressors under arms to promote good form 2x10           Timed Minutes 25     Therapy Education/Self Care 79590   Details:    Given Home Exercise Program  [employer] HEP (access code [email or web address])  postural retraining   Progress: Reinforced   Education provided to:  Patient   Level of understanding Verbalized   Timed Minutes      Access Code: [email or web address]  Date: 2021  Prepared by: Katiana Birch    Exercises  Doorway Pec Stretch at 90 Degrees Abduction - 2 x daily - 7 x weekly - 2 sets - 10 reps  Seated Passive Cervical Retraction - 2 x daily - 7 x weekly - 2 sets - 10 reps  Shoulder External Rotation and Scapular Retraction - 2 x daily  "- 7 x weekly - 2 sets - 10 reps  Supine Shoulder Horizontal Abduction with Resistance - 1 x daily - 7 x weekly - 2 sets - 10 reps  Standing Row with Anchored Resistance - 1 x daily - 7 x weekly - 2 sets - 10 reps    Total Timed Treatment:     45   mins  Total Time of Visit:             45   mins         ASSESSMENT/PLAN     GOALS  Goals                                                    Progress Note due by 9/29/21                                                                      Recert due by 10/27/21   STG by: 3 weeks Comments Date Last Assessed Status   Improve mobility through thoracic and CT junction Continues to be guarded throughout 9/13/2021 ongoing   Decreased muscle guarding  throughout neck and shoulder girdle Guarded throughout suboccipitals and cervical paraspinals but lui R LS.  9/10/2021 ongoing   Reports no radicular symptoms for a week She has not had any finger pain or radicular symptoms since April. She will occasionally have \"discomfort down her shoulder blades\" but improves following tx with chiro 8/5/2021 met   LTG by: 6 weeks         Reports no exacerbation of headaches for a week HA thi AM on R 9/20/2021 ongoing   Improve cervical rotation [place] to WNL with increased pain  R: 62 deg and L: 43 deg AROM  8/30/2021 ongoing   Report no neck/shoulder pain except occasional minor twinges no more than 1-2/10 Last flare up last Wed-Thurs with pain being 10/10. 0-1/10 today.  8/30/2021 ongoing   Understands improved ergonomics for work and HEP for flexibility and posture She was compliant 2x/day prior to dx of COVID 8/30/2021 ongoing    Improve NDI score to 5% or less  9/50 (18%) on 7/29/2021 (initial eval)  16/50 (32%) on 8/30/2021 8/30/2021 ongoing     Assessment/Plan     ASSESSMENT: She had very little pain upon entry today. We were able to progress postural strengthening and she did well. Her thoracic spine and CT remain hypomobile and she did have some guarding over her L scapula. Gentle core " strengthening was introduced today to promote overall improved spinal alignment.     PLAN:  Assess response to today's session and continue to core and postural strengthening.     Signature: Nancy Goldstein PTA

## 2021-09-27 ENCOUNTER — TELEPHONE (OUTPATIENT)
Dept: NEUROLOGY | Facility: CLINIC | Age: 46
End: 2021-09-27

## 2021-09-27 ENCOUNTER — TREATMENT (OUTPATIENT)
Dept: PHYSICAL THERAPY | Facility: CLINIC | Age: 46
End: 2021-09-27

## 2021-09-27 DIAGNOSIS — M79.18 CERVICAL MYOFASCIAL PAIN SYNDROME: ICD-10-CM

## 2021-09-27 DIAGNOSIS — G44.209 TENSION HEADACHE: ICD-10-CM

## 2021-09-27 DIAGNOSIS — M54.12 RADICULOPATHY, CERVICAL: Primary | ICD-10-CM

## 2021-09-27 PROCEDURE — 97140 MANUAL THERAPY 1/> REGIONS: CPT | Performed by: PHYSICAL THERAPIST

## 2021-09-27 PROCEDURE — 97535 SELF CARE MNGMENT TRAINING: CPT | Performed by: PHYSICAL THERAPIST

## 2021-09-27 NOTE — PROGRESS NOTES
"Physical Therapy Treatment Note and 30 Day Progress Note    Patient: Allison Wilkins                                                                                     Visit Date: 2021  :     1975    Referring practitioner:    DEEPTI Melvin  Date of Initial Visit:          Type: THERAPY  Noted: 2021    Patient seen for 11 sessions    Visit Diagnoses:    ICD-10-CM ICD-9-CM   1. Radiculopathy, cervical  M54.12 723.4   2. Cervical myofascial pain syndrome  M79.18 729.1   3. Tension headache  G44.209 307.81     SUBJECTIVE     Subjective She took medicine this AM and she is doing well overall. She reports her neurologists office called her to ask how she was doing. She's had problems getting her Rx filled d/t issues with pharmacy. She reports she has a \"touch of a HA\" every morning and overall feels intensity is unchanged but frequency has increased. She is unsure if this is d/t COVID. She reports they're \"just kind of a nuisance. They're just kind of annoying.\" She feels she has improved some and feels good when she leaves but is worse again when she comes back to PT. She would like to get to where HA are 1-3x/week.    PAIN: 0-1/10 > 1-2/10     OBJECTIVE     Objective      Manual Therapy     99830  Comments   STM with massage cream to B UT/LS and suboccipitals Prone, significantly guarded throughout B UT (R > L), TP x2 noted in R UT   Prone throracic extension mobilizations Prone, hypomobile throughout, tenderness reported   Cervical T/D, also with intermittent suboccipital release    Cervical PA's and side glides  Hypomobile throughout       Timed Minutes 27     Therapeutic Exercises    37206 Comments   Cervical rotation AROM  R: 58 deg and L: 48 deg       Timed Minutes 2     Therapy Education/Self Care 67045   Details: Discussed symptoms and presentation and progression with PT, filled out NDI, HEP   Given Home Exercise Program  [employer] HEP (access code [email or web address])  postural retraining " "  Progress: Reinforced   Education provided to:  Patient   Level of understanding Verbalized   Timed Minutes 12     Access Code: [email or web address]  Date: 09/08/2021  Prepared by: Katiana Birch    Exercises  Doorway Pec Stretch at 90 Degrees Abduction - 2 x daily - 7 x weekly - 2 sets - 10 reps  Seated Passive Cervical Retraction - 2 x daily - 7 x weekly - 2 sets - 10 reps  Shoulder External Rotation and Scapular Retraction - 2 x daily - 7 x weekly - 2 sets - 10 reps  Supine Shoulder Horizontal Abduction with Resistance - 1 x daily - 7 x weekly - 2 sets - 10 reps  Standing Row with Anchored Resistance - 1 x daily - 7 x weekly - 2 sets - 10 reps    Total Timed Treatment:     41   mins  Total Time of Visit:             42   mins         ASSESSMENT/PLAN     GOALS  Goals                                              Progress Note due by 10/27/21                                                                  Recert due by 10/27/21   STG by: 3 weeks Comments Date Status   Improve mobility through thoracic and CT junction Very hypomobile this date, reporting tenderness throughout CT junction/t-spine 9/27/21 ongoing   Decreased muscle guarding  throughout neck and shoulder girdle Very guarded throughout, lui B UT 9/27/21 ongoing   Reports no radicular symptoms for a week She has not had any finger pain or radicular symptoms since April. She will occasionally have \"discomfort down her shoulder blades\" but improves following tx with chiro 8/5/21 met   LTG by: 6 weeks         Reports no exacerbation of headaches for a week Reports HA daily, frequency inc, intensity unchanged 9/27/21 ongoing   Improve cervical rotation [place] to WNL with increased pain  R: 62 deg and L: 43 deg AROM - 8/30/21   R: 58 deg and L: 48 deg AROM - 9/27/21 9/27/21 ongoing   Report no neck/shoulder pain except occasional minor twinges no more than 1-2/10 Pain is 5/10 at worst 9/27/21 ongoing   Understands improved ergonomics for work and HEP for flexibility and " posture She reports daily compliance 9/27/21 ongoing    Improve NDI score to 5% or less  9/50 (18%) on 7/29/2021 (initial eval)  16/50 (32%) on 8/30/2021   9/50 (18%) on 9/27/2021 9/27/21 ongoing     Assessment/Plan     ASSESSMENT: Patient has met one of her goals and reports feeling slightly improved with HA frequency. Her NDI score has reduced by nearly 50% since last assessment. She continues to c/o HA and muscle guarding, but is unsure if recent COVID Dx and personal stressors play a roll in this. Since beginning PT, her postural awareness appears to be improving and we have recently been able to progress resistance with exercises. She has an upcoming apt with her neurologist, which will hopefully provide more answers. She would benefit from continued skilled PT to improve her muscle guarding/soft tissue restriction, pain, postural awareness, and postural strength to improve her overall quality of life 2x/week for 4 weeks.     PLAN:  Continue to progress core and postural strengthening, encouraging increased cervical AROM. Continue to monitor symptoms, especially regarding HA. Address cervicothoracic mobility and muscle guarding as needed. Continue to progoress and bolster her HEP as appropriate.     Signature: Katiana Birch, PTA

## 2021-09-27 NOTE — TELEPHONE ENCOUNTER
Patient said she just got a call then the phone cut off. I see nothing in chart.     Patient states she needs to speak with paula nurse anyway- I adv she will have to call her back.     PLEASE CALL PATIENT REGARDING HER NURTEC RX.

## 2021-09-27 NOTE — PROGRESS NOTES
Progress Note Addendum      Patient: Allison Wilkins           : 1975  Visit Date: 2021  Referring practitioner: DEEPTI Melvin  Date of Initial Visit: Type: THERAPY  Noted: 2021  Patient seen for 11 sessions  Visit Diagnoses:    ICD-10-CM ICD-9-CM   1. Radiculopathy, cervical  M54.12 723.4   2. Cervical myofascial pain syndrome  M79.18 729.1   3. Tension headache  G44.209 307.81       PT G-Codes  Outcome Measure Options: Neck Disability Index (NDI)  Neck Disability Index Score: 9  Clinical Progress: unchanged  Home Program Compliance: Yes  Progress toward previous goals: Partially Met  Prognosis to achieve goals: good    Objective     Assessment & Plan     Assessment  Impairments: abnormal muscle firing, abnormal muscle tone, abnormal or restricted ROM, activity intolerance, impaired physical strength, lacks appropriate home exercise program and pain with function  Prognosis: good  Functional Limitations: uncomfortable because of pain, sitting and unable to perform repetitive tasks  Plan  Therapy options: will be seen for skilled physical therapy services  Planned modality interventions: dry needling, low level laser therapy, TENS and traction  Planned therapy interventions: manual therapy, neuromuscular re-education, spinal/joint mobilization, home exercise program, body mechanics training, stretching, therapeutic activities, strengthening, soft tissue mobilization and postural training  Frequency: 2x week  Duration in weeks: 4  Treatment plan discussed with: SEAN        I have reviewed the progress note information provided by Katiana Birch PTA, and I concur with the findings.    Vasu James, PT  Physical Therapist

## 2021-09-29 ENCOUNTER — TREATMENT (OUTPATIENT)
Dept: PHYSICAL THERAPY | Facility: CLINIC | Age: 46
End: 2021-09-29

## 2021-09-29 DIAGNOSIS — M79.18 CERVICAL MYOFASCIAL PAIN SYNDROME: ICD-10-CM

## 2021-09-29 DIAGNOSIS — M54.12 RADICULOPATHY, CERVICAL: Primary | ICD-10-CM

## 2021-09-29 DIAGNOSIS — G44.209 TENSION HEADACHE: ICD-10-CM

## 2021-09-29 PROCEDURE — 97110 THERAPEUTIC EXERCISES: CPT | Performed by: PHYSICAL THERAPIST

## 2021-09-29 PROCEDURE — 97140 MANUAL THERAPY 1/> REGIONS: CPT | Performed by: PHYSICAL THERAPIST

## 2021-09-29 NOTE — PROGRESS NOTES
"Physical Therapy Treatment Note    Patient: Allison Wilkins                                                                                     Visit Date: 2021  :     1975    Referring practitioner:    DEEPTI Melvin  Date of Initial Visit:          Type: THERAPY  Noted: 2021    Patient seen for 12 sessions    Visit Diagnoses:    ICD-10-CM ICD-9-CM   1. Radiculopathy, cervical  M54.12 723.4   2. Cervical myofascial pain syndrome  M79.18 729.1   3. Tension headache  G44.209 307.81     SUBJECTIVE     Subjective Has a \"little bit of a HA\" in the back of her head. Pain is not \"horrible\", but she has not had medicine yet this AM    PAIN: 1-2/10 > 0/10     OBJECTIVE     Objective      Manual Therapy     12967  Comments   STM with massage cream to B UT/LS and suboccipitals prone   Prone throracic extension mobilizations prone   IASTM with pink tool to B UT/LS, suboccipitals prone           Timed Minutes 18     Therapeutic Exercises    47200 Comments   B UE SciFit (resistance level 3.0, seat #7) 5 min   D2 PNF pattern with RTB bilaterally  2 x 10 ea   Wall push ups 2 x 10    Seated with RTB anchored at floor, B UE fully extended  2 x 10    B UE unweighted cervical rotation AROM  2 min           Timed Minutes 27     Therapy Education/Self Care 02984   Details: Posture with driving    Given Home Exercise Program  [employer] HEP (access code [email or web address])  postural retraining   Progress: Reinforced   Education provided to:  Patient   Level of understanding Verbalized   Timed Minutes      Access Code: [email or web address]  Date: 2021  Prepared by: Katiana Birch    Exercises  Doorway Pec Stretch at 90 Degrees Abduction - 2 x daily - 7 x weekly - 2 sets - 10 reps  Seated Passive Cervical Retraction - 2 x daily - 7 x weekly - 2 sets - 10 reps  Shoulder External Rotation and Scapular Retraction - 2 x daily - 7 x weekly - 2 sets - 10 reps  Supine Shoulder Horizontal Abduction with Resistance - 1 x daily - 7 x " "weekly - 2 sets - 10 reps  Standing Row with Anchored Resistance - 1 x daily - 7 x weekly - 2 sets - 10 reps    Total Timed Treatment:     41   mins  Total Time of Visit:             42   mins         ASSESSMENT/PLAN     GOALS  Goals                                              Progress Note due by 10/27/21                                                                  Recert due by 10/27/21   STG by: 3 weeks Comments Date Status   Improve mobility through thoracic and CT junction Very hypomobile this date, reporting tenderness throughout CT junction/t-spine 9/27/21 ongoing   Decreased muscle guarding  throughout neck and shoulder girdle Significant guarding throughout, improved following manual, lui IASTM. 9/29/21 ongoing   Reports no radicular symptoms for a week She has not had any finger pain or radicular symptoms since April. She will occasionally have \"discomfort down her shoulder blades\" but improves following tx with chiro 8/5/21 met   LTG by: 6 weeks         Reports no exacerbation of headaches for a week Reports HA daily, frequency inc, intensity unchanged 9/27/21 ongoing   Improve cervical rotation [place] to WNL with increased pain  R: 62 deg and L: 43 deg AROM - 8/30/21   R: 58 deg and L: 48 deg AROM - 9/27/21 9/27/21 ongoing   Report no neck/shoulder pain except occasional minor twinges no more than 1-2/10 Pain is 5/10 at worst 9/27/21 ongoing   Understands improved ergonomics for work and HEP for flexibility and posture She reports daily compliance 9/27/21 ongoing    Improve NDI score to 5% or less  9/50 (18%) on 7/29/2021 (initial eval)  16/50 (32%) on 8/30/2021   9/50 (18%) on 9/27/2021 9/27/21 ongoing     Assessment/Plan     ASSESSMENT: Progressed with postural strengthening and cervical ROM today. Still very guarded throughout B UT, improved following manual (especially IASTM).    PLAN:  Assess response to long distance driving and proceed accordingly. Continue with postural and core strengthening " as tolerated and manual therapies for pain and guarding as needed.     Signature: Katiana Birch, SEAN

## 2021-10-11 ENCOUNTER — TREATMENT (OUTPATIENT)
Dept: PHYSICAL THERAPY | Facility: CLINIC | Age: 46
End: 2021-10-11

## 2021-10-11 DIAGNOSIS — M54.12 RADICULOPATHY, CERVICAL: Primary | ICD-10-CM

## 2021-10-11 DIAGNOSIS — M79.18 CERVICAL MYOFASCIAL PAIN SYNDROME: ICD-10-CM

## 2021-10-11 DIAGNOSIS — G44.209 TENSION HEADACHE: ICD-10-CM

## 2021-10-11 PROCEDURE — 97140 MANUAL THERAPY 1/> REGIONS: CPT | Performed by: PHYSICAL THERAPIST

## 2021-10-11 PROCEDURE — 97110 THERAPEUTIC EXERCISES: CPT | Performed by: PHYSICAL THERAPIST

## 2021-10-11 NOTE — PROGRESS NOTES
"Physical Therapy Treatment Note    Patient: Allison Wilkins                                                                                     Visit Date: 10/11/2021  :     1975    Referring practitioner:    DEEPTI Melvin  Date of Initial Visit:          Type: THERAPY  Noted: 2021    Patient seen for 13 sessions    Visit Diagnoses:    ICD-10-CM ICD-9-CM   1. Radiculopathy, cervical  M54.12 723.4   2. Cervical myofascial pain syndrome  M79.18 729.1   3. Tension headache  G44.209 307.81     SUBJECTIVE     Subjective She has no pain right now. She had a little HA this AM around 1:30 but was \"stressing herself out\". She didn't have to take any of her HA medicine last week on vacation. Only had to occasionally take Ibuprofen, reports it's \"probably been about two months since I've had to take any migraine medicine.\"    PAIN: 0/10 > 0/10     OBJECTIVE     Objective      Manual Therapy     34249  Comments   STM with massage cream to B UT/LS and suboccipitals        Timed Minutes 8     Therapeutic Exercises    09324 Comments   Standing rows with GTB 2 x 10 -- progressed from RTB   B UE phasic against RTB  3 x 10    Wall push ups 2 x 10    Supine horizontal abduction with RTB  2 x 10   B UE unweighted cervical rotation AROM  2 min   HL \"Y's\" against RTB 2 x 10    Wall slides with RTB  2 x 10    Reviewed and reinforced HEP Provided [email or web address] code and sent link for driss       Timed Minutes 35     Therapy Education/Self Care 32644   Details: See below   Given Home Exercise Program  [employer] HEP (access code [email or web address])  postural retraining   Progress: Reinforced   Education provided to:  Patient   Level of understanding Verbalized   Timed Minutes      Access Code: [email or web address]  Date: 10/11/2021  Prepared by: Katiana Birch    Exercises  Doorway Pec Stretch at 90 Degrees Abduction - 2 x daily - 7 x weekly - 2 sets - 10 reps  Seated Passive Cervical Retraction - 2 x daily - 7 x weekly - 2 sets - 10 " "reps  Shoulder External Rotation and Scapular Retraction - 2 x daily - 7 x weekly - 2 sets - 10 reps  Supine Shoulder Horizontal Abduction with Resistance - 1 x daily - 7 x weekly - 2 sets - 10 reps  Standing Row with Anchored Resistance - 1 x daily - 7 x weekly - 2 sets - 10 reps  HOOKLYING \"Y'S\" - 1-2 x daily - 7 x weekly - 2 sets - 10 reps    Total Timed Treatment:     43   mins  Total Time of Visit:             43   mins         ASSESSMENT/PLAN     GOALS  Goals                                              Progress Note due by 10/27/21                                                                  Recert due by 10/27/21   STG by: 3 weeks Comments Date Status   Improve mobility through thoracic and CT junction Very hypomobile this date, reporting tenderness throughout CT junction/t-spine 9/27/21 ongoing   Decreased muscle guarding  throughout neck and shoulder girdle Guarding much improved 10/11/21 met   Reports no radicular symptoms for a week She has not had any finger pain or radicular symptoms since April. She will occasionally have \"discomfort down her shoulder blades\" but improves following tx with chiro 8/5/21 met   LTG by: 6 weeks         Reports no exacerbation of headaches for a week Very mild HA this AM d/t stress, none now. Reports no HA for past week.  10/11/21 met   Improve cervical rotation [place] to WNL with increased pain  R: 62 deg and L: 43 deg AROM - 8/30/21   R: 58 deg and L: 48 deg AROM - 9/27/21 9/27/21 ongoing   Report no neck/shoulder pain except occasional minor twinges no more than 1-2/10 At worst, pain has been 2/10 but 0-1/10 on average 10/11/21 met   Understands improved ergonomics for work and HEP for flexibility and posture Reviewed and reinforced today. Compliant even on vacation, reports benefit. Bolstered today.  10/11/21 met    Improve NDI score to 5% or less  9/50 (18%) on 7/29/2021 (initial eval)  16/50 (32%) on 8/30/2021   9/50 (18%) on 9/27/2021   4/50 (8%) on 10/11/2021 " 10/11/21 ongoing     Assessment/Plan     ASSESSMENT: Patient returns to clinic following one week hiatus d/t vacation reporting significantly improved symptoms, with only very occasional minor HA, primarily related to stress (per pt). She has no pain today and reports that at worst, pain is 1-2/10. She has met five of her eight goals and overall is doing very well.     PLAN:  Place POC on hold and continue DN PRN.     Signature: Katiana Birch, PTA

## 2021-10-22 ENCOUNTER — OFFICE VISIT (OUTPATIENT)
Dept: NEUROLOGY | Facility: CLINIC | Age: 46
End: 2021-10-22

## 2021-10-22 VITALS
HEIGHT: 64 IN | DIASTOLIC BLOOD PRESSURE: 78 MMHG | SYSTOLIC BLOOD PRESSURE: 132 MMHG | OXYGEN SATURATION: 100 % | BODY MASS INDEX: 31.24 KG/M2 | WEIGHT: 183 LBS | HEART RATE: 75 BPM | RESPIRATION RATE: 17 BRPM

## 2021-10-22 DIAGNOSIS — G43.009 MIGRAINE WITHOUT AURA AND WITHOUT STATUS MIGRAINOSUS, NOT INTRACTABLE: Primary | ICD-10-CM

## 2021-10-22 DIAGNOSIS — M79.18 CERVICAL MYOFASCIAL PAIN SYNDROME: ICD-10-CM

## 2021-10-22 DIAGNOSIS — M54.12 CERVICAL RADICULOPATHY: ICD-10-CM

## 2021-10-22 PROCEDURE — 99213 OFFICE O/P EST LOW 20 MIN: CPT | Performed by: NURSE PRACTITIONER

## 2021-10-22 RX ORDER — SUMATRIPTAN 50 MG/1
50 TABLET, FILM COATED ORAL AS NEEDED
COMMUNITY
Start: 2021-09-23 | End: 2022-12-07

## 2021-10-22 RX ORDER — NYSTATIN AND TRIAMCINOLONE ACETONIDE 100000; 1 [USP'U]/G; MG/G
OINTMENT TOPICAL AS NEEDED
COMMUNITY
Start: 2021-07-28

## 2021-10-22 NOTE — PROGRESS NOTES
Neurology Progress Note      Chief Complaint:    Headaches  Cervical pain    Subjective     Subjective:  Allison Wilkins is a 46 y.o. female who presents today for follow-up of headaches. She was last seen by me on 7/23/2021 where she was experiencing primarily cervicogenic headaches and occasional migraine. She had also been seen by Amauri Diaz PA-C in the past where she had been prescribed Nurtec for abortive treatment of migraine. She presents today stating she has had some increase in headache burden since her last visit. However this is in the setting of increased stress, physical therapy being on hold, missed chiropractic doses, and return to work. She states that since her last visit she is only having one headache which she would deem as migrainous. She was able to take Nurtec and this did successfully abort her migraine after about an hour. She reports that as she was going to physical therapy she would note improvement of headaches after sessions. However, she has had Covid in that timeframe and reports changes to her regular routine. She states that since she has not been going to physical therapy for chiropractic treatments she has noticed an increase in her daily headaches. She does state that they are much less severe in nature than they previously were. She does state that she is able to try over-the-counter ibuprofen and this will help relieve her headache. She states that her headaches are primarily in the morning. I did ask her about possibility of obstructive sleep apnea and, despite snoring, she otherwise has no risk factors or symptoms of sleep apnea.    Past Medical History:   Diagnosis Date   • Stewart syndrome    • Disease of thyroid gland    • Dysphagia    • Eosinophilic esophagitis    • GERD (gastroesophageal reflux disease)    • Heavy periods     with pelvic pain   • Hemorrhoids    • Hypertension    • IBS (irritable bowel syndrome)    • PONV (postoperative nausea and vomiting)    •  Rectal bleeding      Past Surgical History:   Procedure Laterality Date   • CHOLECYSTECTOMY     • DILATION AND CURETTAGE, DIAGNOSTIC / THERAPEUTIC     • ENDOSCOPY N/A 12/29/2016    Procedure: ESOPHAGOGASTRODUODENOSCOPY WITH ANESTHESIA;  Surgeon: Theodore Traylor MD;  Location: [facility] ENDOSCOPY;  Service:    • ENDOSCOPY N/A 11/8/2019    Procedure: ESOPHAGOGASTRODUODENOSCOPY WITH ANESTHESIA;  Surgeon: Theodore Traylor MD;  Location: [facility] ENDOSCOPY;  Service: Gastroenterology   • ENDOSCOPY AND COLONOSCOPY  12/04/2013   • TOTAL LAPAROSCOPIC HYSTERECTOMY N/A 12/21/2017    Procedure: TOTAL LAPAROSCOPIC HYSTERECTOMY WITH DAVINCI SI ROBOT;  Surgeon: Joyce Mcfadden MD;  Location: [facility] OR;  Service:    • TUBAL ABDOMINAL LIGATION       Family History   Problem Relation Age of Onset   • Diverticulitis Mother    • Colon polyps Maternal Grandmother    • Colon cancer Neg Hx      Social History     Tobacco Use   • Smoking status: Never Smoker   • Smokeless tobacco: Never Used   Substance Use Topics   • Alcohol use: Yes     Comment: just occassional every 3-4 months   • Drug use: No     Medications:  Current Outpatient Medications   Medication Sig Dispense Refill   • cetirizine (zyrTEC) 10 MG tablet Take 10 mg by mouth Daily.     • dicyclomine (BENTYL) 10 MG capsule TAKE (1) CAPSULE BY MOUTH EVERY SIX HOURS AS NEEDED (FOR ABDOMINAL CRAMPING WITH DIARRHEA AFTER EATING) (Patient taking differently: Take 10 mg by mouth As Needed.) 60 capsule 5   • FLUoxetine (PROzac) 20 MG capsule Take 20 mg by mouth Daily.     • hydroCHLOROthiazide (HYDRODIURIL) 25 MG tablet Take 25 mg by mouth Daily.     • levothyroxine (SYNTHROID, LEVOTHROID) 175 MCG tablet Take 175 mcg by mouth Daily.     • nystatin-triamcinolone (MYCOLOG) 309989-3.1 UNIT/GM-% ointment      • ondansetron ODT (ZOFRAN-ODT) 4 MG disintegrating tablet Place 4 mg on the tongue Every 8 (Eight) Hours As Needed for Nausea.     • pantoprazole (PROTONIX) 40 MG EC tablet Take 40  mg by mouth Daily.     • Rimegepant Sulfate (NURTEC) 75 MG tablet dispersible tablet Take 1 tablet by mouth Take As Directed. TAKE ONE TABLET AT ONSET OF MIGRAINE. DO NOT REPEAT FOR 24 HOURS. 8 tablet 6   • sucralfate (CARAFATE) 1 g tablet Take 1 g by mouth Daily As Needed.     • SUMAtriptan (IMITREX) 50 MG tablet Take 50 mg by mouth As Needed.     • tiZANidine (ZANAFLEX) 4 MG tablet Take 1 tablet by mouth At Night As Needed for Muscle Spasms. 30 tablet 0     No current facility-administered medications for this visit.     Current outpatient and discharge medications have been reconciled for the patient.  Reviewed by: DEEPTI Jo      Allergies:    Patient has no known allergies.    Review of Systems:   Review of Systems   Musculoskeletal: Positive for neck pain.   Neurological: Positive for headache. Negative for weakness and memory problem.   All other systems reviewed and are negative.        Objective      Vital Signs  Heart Rate:  [75] 75  Resp:  [17] 17  BP: (132)/(78) 132/78    Physical Exam:  Physical Exam  Vitals reviewed.   Constitutional:       Appearance: Normal appearance.   HENT:      Head: Normocephalic.   Eyes:      Extraocular Movements: Extraocular movements intact.      Pupils: Pupils are equal, round, and reactive to light.   Cardiovascular:      Rate and Rhythm: Normal rate and regular rhythm.      Pulses: Normal pulses.   Pulmonary:      Effort: Pulmonary effort is normal.   Musculoskeletal:         General: Normal range of motion.      Cervical back: Normal range of motion and neck supple.   Skin:     General: Skin is warm and dry.      Capillary Refill: Capillary refill takes less than 2 seconds.   Neurological:      Gait: Gait is intact.   Psychiatric:         Mood and Affect: Mood normal.     Neurologic Exam:  Mental Status:    -Awake. Alert. Oriented to person, place & time.  -No word finding difficulties.  -No aphasia.  -No dysarthria.  -Follows simple commands.     CN II:  Full  visual fields with confrontation.  Pupils equally reactive to light.  CN III, IV, VI:  Extraocular muscles function intact with no nystagmus.  CN V:  Facial sensory is symmetric.  CN VII:  Facial motor symmetric.  CN VIII:  Gross hearing intact bilaterally.  CN IX/X:  Palate elevates symmetrically.  CN XI:  Shoulder shrug symmetric.  CN XII:  Tongue is midline on protrusion.     Motor: (strength out of 5:  1= minimal movement, 2 = movement in plane of gravity, 3 = movement against gravity, 4 = movement against some resistance, 5 = full strength)     -5/5 in bilateral biceps, triceps, brachioradialis, wrist extensors and intrinsic muscles of the hand.    -5/5 in bilateral hip flexors, quadriceps, hamstrings, gastrocsoleus complex, anterior tibialis and extensor hallucis longus.       Deep Tendon Reflexes:  -Right              Biceps: 2+         Triceps: 2+      Brachioradialis: 2+              Patella: 2+       Ankle: 2+         Babinski:  negative  -Left              Biceps: 2+         Triceps: 2+      Brachioradialis: 2+              Patella: 2+       Ankle: 2+         Babinski:  negative    Tone (Modified Angelo Scale):  No appreciable increase in tone or rigidity noted.     Sensory:  -Intact to light touch, pinprick BUE (C5-T1) and BLE (L2-S1).     Coordination:  -Finger to nose intact BUEs  -Heel to shin intact BLEs  -No ataxia     Gait  -No signs of ataxia  -ambulates unassisted    Cervical Spine Exam:  RANGE OF MOTION: The patient is unable to flex, extend, rotate, and side bend without pain or difficulty.  There is decreased range of motion.    PALPATION: Tender to palpation midline and through upper cervical musculature and shoulders.    Assessment/Plan     Impression:  • Migraine headaches  • Cervicogenic headaches  • Cervical myofascial pain    Plan:  • Continue Nurtec 75 mg as abortive agent for migraine.     • I do recommend that she continue to see physical therapy. She states that treatments have  been put on hold since she has had Covid. However, I have advised her to contact them to restart treatments. She did notice improvement post treatments with her headaches.    • She states that she notices improvement with massage therapy and chiropractic care. I advised her to return to regular treatment of these if they're successful in helping her headaches. She has been inconsistent with these treatments since her family has had Covid.    • She reports significant stress recently which does, in fact, increased tension in her neck and shoulder structures. As a result, she notes increased headaches. I have advised her of stress relieving techniques and note that this may be helpful and useful for the prevention of headaches.    • She does have known cervical degenerative disease from prior MRI of her cervical spine and I did discuss with her the possibility of neurosurgical evaluation or pain management evaluation. At this time she would like to continue with physical therapy and restart all of her previous treatments to see if this will help.    • She is to let me know of any changes or concerns in the meantime. She expresses understanding    The patient and I have discussed the plan of care and she is in full agreement at this time.     Follow-Up:  • Return in about 2 months (around 12/22/2021) for Headaches.      Nico Mayorga, DEEPTI  10/22/21  12:13 CDT

## 2021-11-04 ENCOUNTER — TREATMENT (OUTPATIENT)
Dept: PHYSICAL THERAPY | Facility: CLINIC | Age: 46
End: 2021-11-04

## 2021-11-04 DIAGNOSIS — M79.18 CERVICAL MYOFASCIAL PAIN SYNDROME: ICD-10-CM

## 2021-11-04 DIAGNOSIS — M54.12 RADICULOPATHY, CERVICAL: Primary | ICD-10-CM

## 2021-11-04 DIAGNOSIS — G44.209 TENSION HEADACHE: ICD-10-CM

## 2021-11-04 PROCEDURE — 97535 SELF CARE MNGMENT TRAINING: CPT | Performed by: PHYSICAL THERAPIST

## 2021-11-04 PROCEDURE — 97140 MANUAL THERAPY 1/> REGIONS: CPT | Performed by: PHYSICAL THERAPIST

## 2021-11-04 PROCEDURE — 97014 ELECTRIC STIMULATION THERAPY: CPT | Performed by: PHYSICAL THERAPIST

## 2021-11-04 NOTE — PROGRESS NOTES
30 Day Progress Note and 90 Day Recertification Addendum      Patient: Allison Wilkins           : 1975  Visit Date: 2021  Referring practitioner: DEEPTI Melvin  Date of Initial Visit: Type: THERAPY  Noted: 2021  Patient seen for 14 sessions  Visit Diagnoses:    ICD-10-CM ICD-9-CM   1. Radiculopathy, cervical  M54.12 723.4   2. Tension headache  G44.209 307.81   3. Cervical myofascial pain syndrome  M79.18 729.1       PT G-Codes  Outcome Measure Options: Neck Disability Index (NDI)  Neck Disability Index Score: 11  Clinical Progress: worse  Home Program Compliance: Yes  Progress toward previous goals: Partially Met  Prognosis to achieve goals: fair    Objective     Assessment & Plan     Assessment  Impairments: abnormal muscle firing, abnormal muscle tone, abnormal or restricted ROM, activity intolerance, impaired physical strength, lacks appropriate home exercise program and pain with function  Prognosis: good  Functional Limitations: uncomfortable because of pain, sitting and unable to perform repetitive tasks  Plan  Therapy options: will be seen for skilled physical therapy services  Planned modality interventions: dry needling, low level laser therapy, TENS and traction  Planned therapy interventions: manual therapy, neuromuscular re-education, spinal/joint mobilization, home exercise program, body mechanics training, stretching, therapeutic activities, strengthening, soft tissue mobilization and postural training  Frequency: 2x week  Duration in weeks: 4  Treatment plan discussed with: SEAN        I have reviewed the progress note information provided by Katiana Birch PTA, and I concur with their findings.    SIGNATURE: Vasu James PT, License #: [id number]  Electronically Signed on 2021      90 Day Recertification  Certification Period: 2021 through 2022  Based upon review of the patient's progress and continued therapy plan, it is my medical opinion that Allison  Franky should continue physical therapy treatment at [facility]     PHYSICIAN: Dali Rankin, DEEPTI______________________________________________DATE: ___________________     Please sign and return via fax to [phone].   Thank you so much for letting us work with Allison. I appreciate your letting us work with your patients. If you have any questions or concerns, please don't hesitate to contact me.

## 2021-11-04 NOTE — PROGRESS NOTES
Physical Therapy Treatment Note, 30 Day Progress Note and 90 Day Recertification Note    Patient: Allison Wilkins                                                                                     Visit Date: 2021  :     1975    Referring practitioner:    DEEPTI Melvin  Date of Initial Visit:          Type: THERAPY  Noted: 2021    Patient seen for 14 sessions    Visit Diagnoses:    ICD-10-CM ICD-9-CM   1. Radiculopathy, cervical  M54.12 723.4   2. Tension headache  G44.209 307.81   3. Cervical myofascial pain syndrome  M79.18 729.1     SUBJECTIVE     Subjective She filled in at the dentist's office for 2.5 days and this flared her pain. She went to the chiropractor on Monday and this helped. She went to see Nico Mayorga and he wanted her to keep coming to PT. Since working, she has had to take more of her migraine medicine and increased frequency of HA. She feels she has regressed some since returning to work.     PAIN: 2/10 (with Ibuprofen) > 1/10     OBJECTIVE     Objective      Manual Therapy     72610  Comments   STM with massage cream to B UT/LS and suboccipitals Prone -- very guarded   Thoracic and cervical PA's Gr 2-3   Cervical manual T/D with suboccipital release sustained   Cervical side glides Gr 2   Cervical ROM following manual therapies See goals table       Timed Minutes 30     Modalities Comments   TENS (pre-mod), max amp 9, prone B UT -- applied moist heat to neck throughout       Minutes 25     Therapy Education/Self Care [id number]   Details: Symptoms, current presentation, reaction from RTW and subsequent regression, NDI, POC moving forward, TENS, DN and massage therapy PRN   Given Home Exercise Program  Jessica HEP (access code [email or web address])  postural retraining   Progress: Reinforced   Education provided to:  Patient   Level of understanding Verbalized   Timed Minutes 17     Access Code: [email or web address]  Date: 10/11/2021  Prepared by: Katiana Gaspar  "Stretch at 90 Degrees Abduction - 2 x daily - 7 x weekly - 2 sets - 10 reps  Seated Passive Cervical Retraction - 2 x daily - 7 x weekly - 2 sets - 10 reps  Shoulder External Rotation and Scapular Retraction - 2 x daily - 7 x weekly - 2 sets - 10 reps  Supine Shoulder Horizontal Abduction with Resistance - 1 x daily - 7 x weekly - 2 sets - 10 reps  Standing Row with Anchored Resistance - 1 x daily - 7 x weekly - 2 sets - 10 reps  HOOKLYING \"Y'S\" - 1-2 x daily - 7 x weekly - 2 sets - 10 reps    Total Timed Treatment:     47   mins  Total Time of Visit:             72   mins         ASSESSMENT/PLAN     GOALS  Goals                                              Progress Note due by 11/3/21                                                                  Recert due by 2/2/22   STG by: 3 weeks Comments Date Status   Improve mobility through thoracic and CT junction Hypomobile throughout 11/4/21 ongoing   Decreased muscle guarding  throughout neck and shoulder girdle Very signficant guarding this AM, lui throughout cervical paraspinals and B UT 11/4/21 Previously met, ongoing   Reports no radicular symptoms for a week She has not had any finger pain or radicular symptoms since April. She will occasionally have \"discomfort down her shoulder blades\" but improves following tx with chiro 8/5/21 met   LTG by: 6 weeks         Reports no exacerbation of headaches for a week Reports about 5 HA this past week 11/4/21 Previously met, ongoing   Improve cervical rotation [place] to WNL with increased pain  R: 62 deg and L: 43 deg AROM - 8/30/21   R: 58 deg and L: 48 deg AROM - 9/27/21   R: 59 deg and L: 49 deg AROM - 11/4/21 11/4/21 met   Report no neck/shoulder pain except occasional minor twinges no more than 1-2/10 At worst, pain is 6-7/10 and is 5/10 on avg 11/4/21 Previously met, ongoing   Understands improved ergonomics for work and HEP for flexibility and posture Continues to be compliant daily.  11/4/21 met    Improve NDI score " to 5% or less  9/50 (18%) on 7/29/2021 (initial eval)  16/50 (32%) on 8/30/2021   9/50 (18%) on 9/27/2021   4/50 (8%) on 10/11/2021  11/50 (22%) on 11/4/21 11/4/21 regressing     Assessment/Plan     ASSESSMENT: Patient returns to the clinic after three weeks hiatus d/t personal conflicts with schedule. 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 She has also had an apt with her neurologists, reporting he wants her to continue PT. Previously, she was making good progress towards her goals and had met five of her eight goals, but has now met two of her goals. Following tx today, pt demonstrated cervical rotation AROM which has improved since last assessment. She would benefit from continued skilled PT to address her guarding and hypomobility and progress postural and core strength to return to PLOF as noted last session (10/11/2021). Will continue address deficits and provide pt with home modalities and skills to manage pain independently.     PLAN:  Follow-up with response to TENS and consider home TENS and traction unit. Continue PT 2x/week for 4 weeks and then re-assess progress. Following assessment, if appropriate, consider placing POC on hold and pt to continue independent pain mgmt with home TENS, DN PRN, and massage therapy biweekly while also regularly complying with comprehensive HEP. In the meantime, over the next four weeks, continue to reinforce and progress HEP as able, also reinforcing postural awareness and progressing postural and core strength. If able to obtain job as , review proper workplace ergonomics.     Signature: Katiana Birch PTA, License #: [id number]    Electronically Signed on 11/4/2021

## 2021-11-19 ENCOUNTER — TELEPHONE (OUTPATIENT)
Dept: PHYSICAL THERAPY | Facility: CLINIC | Age: 46
End: 2021-11-19

## 2021-11-19 RX ORDER — DICYCLOMINE HYDROCHLORIDE 10 MG/1
CAPSULE ORAL
Qty: 60 CAPSULE | Refills: 0 | OUTPATIENT
Start: 2021-11-19

## 2021-11-24 RX ORDER — DICYCLOMINE HYDROCHLORIDE 10 MG/1
CAPSULE ORAL
Qty: 60 CAPSULE | Refills: 0 | OUTPATIENT
Start: 2021-11-24

## 2021-12-06 RX ORDER — DICYCLOMINE HYDROCHLORIDE 10 MG/1
CAPSULE ORAL
Qty: 60 CAPSULE | Refills: 0 | OUTPATIENT
Start: 2021-12-06

## 2021-12-10 RX ORDER — DICYCLOMINE HYDROCHLORIDE 10 MG/1
10 CAPSULE ORAL EVERY 6 HOURS PRN
Qty: 60 CAPSULE | Refills: 0 | Status: SHIPPED | OUTPATIENT
Start: 2021-12-10 | End: 2021-12-23 | Stop reason: SDUPTHER

## 2021-12-22 ENCOUNTER — OFFICE VISIT (OUTPATIENT)
Dept: NEUROLOGY | Facility: CLINIC | Age: 46
End: 2021-12-22

## 2021-12-22 VITALS
OXYGEN SATURATION: 100 % | SYSTOLIC BLOOD PRESSURE: 120 MMHG | HEART RATE: 76 BPM | WEIGHT: 183 LBS | DIASTOLIC BLOOD PRESSURE: 82 MMHG | HEIGHT: 64 IN | RESPIRATION RATE: 17 BRPM | BODY MASS INDEX: 31.24 KG/M2

## 2021-12-22 DIAGNOSIS — G44.86 CERVICOGENIC HEADACHE: ICD-10-CM

## 2021-12-22 DIAGNOSIS — G43.009 MIGRAINE WITHOUT AURA AND WITHOUT STATUS MIGRAINOSUS, NOT INTRACTABLE: Primary | ICD-10-CM

## 2021-12-22 DIAGNOSIS — M79.18 CERVICAL MYOFASCIAL PAIN SYNDROME: ICD-10-CM

## 2021-12-22 PROCEDURE — 99213 OFFICE O/P EST LOW 20 MIN: CPT | Performed by: NURSE PRACTITIONER

## 2021-12-22 NOTE — PROGRESS NOTES
Neurology Progress Note      Chief Complaint:    Migraine  Cervicalgia  Occipital headaches    Subjective     Subjective:  Allison Wilkins is a 46 y.o. female who presents today for migraine, cervicalgia, and occipital headaches.  She continues to have minimal migrainous headaches and reports only 1-2 per month which is successfully aborted with nurtec.  She also presents with continued occipital headaches which are primarily located at the base of her skull.  These do sometimes progress to a frontal headache. She continues to take Zanaflex nightly as well with no improvement. She notes use of OTC medications which help at times.  At our last visit she declined returning to PT as she felt that chiropractic care and massage therapy was helping her symptoms.  She continues to endorse some improvement but she continues to have neck pain and decreased ROM. She does not have any tenderness over her occipital nerves to indicate occipital neuralgia.  She has had MRI of the cervical spine which has shown degenerative changes. I feel that she should go to PT for this at this time.  She notes that she is under some stressful situations right now as she is trying to work 3 jobs at the moment.  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  She is not much worse overall, but she does not note much improvement either.      Past Medical History:   Diagnosis Date   • Stewart syndrome    • Disease of thyroid gland    • Dysphagia    • Eosinophilic esophagitis    • GERD (gastroesophageal reflux disease)    • Heavy periods     with pelvic pain   • Hemorrhoids    • Hypertension    • IBS (irritable bowel syndrome)    • PONV (postoperative nausea and vomiting)    • Rectal bleeding      Past Surgical History:   Procedure Laterality Date   • CHOLECYSTECTOMY     • DILATION AND CURETTAGE, DIAGNOSTIC / THERAPEUTIC     • ENDOSCOPY N/A 12/29/2016    Procedure:  ESOPHAGOGASTRODUODENOSCOPY WITH ANESTHESIA;  Surgeon: Theodore Traylor MD;  Location: [facility] ENDOSCOPY;  Service:    • ENDOSCOPY N/A 11/8/2019    Procedure: ESOPHAGOGASTRODUODENOSCOPY WITH ANESTHESIA;  Surgeon: Theodore Traylor MD;  Location: [facility] ENDOSCOPY;  Service: Gastroenterology   • ENDOSCOPY AND COLONOSCOPY  12/04/2013   • TOTAL LAPAROSCOPIC HYSTERECTOMY N/A 12/21/2017    Procedure: TOTAL LAPAROSCOPIC HYSTERECTOMY WITH DAVINCI SI ROBOT;  Surgeon: Joyce Mcfadden MD;  Location: [facility] OR;  Service:    • TUBAL ABDOMINAL LIGATION       Family History   Problem Relation Age of Onset   • Diverticulitis Mother    • Colon polyps Maternal Grandmother    • Colon cancer Neg Hx      Social History     Tobacco Use   • Smoking status: Never Smoker   • Smokeless tobacco: Never Used   Substance Use Topics   • Alcohol use: Yes     Comment: just occassional every 3-4 months   • Drug use: No     Medications:  Current Outpatient Medications   Medication Sig Dispense Refill   • cetirizine (zyrTEC) 10 MG tablet Take 10 mg by mouth Daily.     • dicyclomine (BENTYL) 10 MG capsule Take 1 capsule by mouth Every 6 (Six) Hours As Needed (prn). 60 capsule 0   • FLUoxetine (PROzac) 20 MG capsule Take 20 mg by mouth Daily.     • hydroCHLOROthiazide (HYDRODIURIL) 25 MG tablet Take 25 mg by mouth Daily.     • levothyroxine (SYNTHROID, LEVOTHROID) 175 MCG tablet Take 175 mcg by mouth Daily.     • nystatin-triamcinolone (MYCOLOG) 208784-8.1 UNIT/GM-% ointment      • ondansetron ODT (ZOFRAN-ODT) 4 MG disintegrating tablet Place 4 mg on the tongue Every 8 (Eight) Hours As Needed for Nausea.     • pantoprazole (PROTONIX) 40 MG EC tablet Take 40 mg by mouth Daily.     • Rimegepant Sulfate (NURTEC) 75 MG tablet dispersible tablet Take 1 tablet by mouth Take As Directed. TAKE ONE TABLET AT ONSET OF MIGRAINE. DO NOT REPEAT FOR 24 HOURS. 8 tablet 6   • sucralfate (CARAFATE) 1 g tablet Take 1 g by mouth Daily As Needed.     • SUMAtriptan  (IMITREX) 50 MG tablet Take 50 mg by mouth As Needed.     • tiZANidine (ZANAFLEX) 4 MG tablet Take 1 tablet by mouth At Night As Needed for Muscle Spasms. 30 tablet 0     No current facility-administered medications for this visit.     Current outpatient and discharge medications have been reconciled for the patient.  Reviewed by: DEEPTI Jo      Allergies:    Patient has no known allergies.    Review of Systems:   Review of Systems   Musculoskeletal: Positive for neck pain and neck stiffness.   Neurological: Positive for headache.   All other systems reviewed and are negative.    Objective      Vital Signs  Heart Rate:  [76] 76  Resp:  [17] 17  BP: (120)/(82) 120/82    Physical Exam:  Physical Exam  Constitutional:       Appearance: Normal appearance.   HENT:      Head: Normocephalic.   Eyes:      Extraocular Movements: Extraocular movements intact.      Pupils: Pupils are equal, round, and reactive to light.   Cardiovascular:      Rate and Rhythm: Normal rate and regular rhythm.      Pulses: Normal pulses.   Pulmonary:      Effort: Pulmonary effort is normal.   Musculoskeletal:         General: Normal range of motion.      Cervical back: Normal range of motion and neck supple.   Skin:     General: Skin is warm and dry.      Capillary Refill: Capillary refill takes less than 2 seconds.   Neurological:      Gait: Gait is intact.   Psychiatric:         Mood and Affect: Mood normal.       Neurologic Exam:  Mental Status:    -Awake. Alert. Oriented to person, place & time.  -No word finding difficulties.  -No aphasia.  -No dysarthria.  -Follows simple commands.     CN II:  Full visual fields with confrontation.  Pupils equally reactive to light.  CN III, IV, VI:  Extraocular muscles function intact with no nystagmus.  CN V:  Facial sensory is symmetric.  CN VII:  Facial motor symmetric.  CN VIII:  Gross hearing intact bilaterally.  CN IX/X:  Palate elevates symmetrically.  CN XI:  Shoulder shrug  symmetric.  CN XII:  Tongue is midline on protrusion.     Motor: (strength out of 5:  1= minimal movement, 2 = movement in plane of gravity, 3 = movement against gravity, 4 = movement against some resistance, 5 = full strength)     -5/5 in bilateral biceps, triceps, brachioradialis, wrist extensors and intrinsic muscles of the hand.    -5/5 in bilateral hip flexors, quadriceps, hamstrings, gastrocsoleus complex, anterior tibialis and extensor hallucis longus.       Deep Tendon Reflexes:  -Right              Biceps: 2+         Triceps: 2+      Brachioradialis: 2+              Patella: 2+       Ankle: 2+         Babinski:  negative  -Left              Biceps: 2+         Triceps: 2+      Brachioradialis: 2+              Patella: 2+       Ankle: 2+         Babinski:  negative    Tone (Modified Angelo Scale):  No appreciable increase in tone or rigidity noted.     Sensory:  -Intact to light touch, pinprick BUE (C5-T1) and BLE (L2-S1).     Coordination:  -Finger to nose intact BUEs  -Heel to shin intact BLEs  -No ataxia     Gait  -No signs of ataxia  -ambulates unassisted      Cervical Spine Exam:  RANGE OF MOTION: The patient is unable to flex, extend, rotate, and side bend without pain or difficulty.  There is decreased range of motion.    PALPATION: Tender to palpation midline and through upper cervical musculature.  STRENGTH: 5/5 bilateral trapezius, deltoid, triceps, biceps, wrist extensors/flexors, finger opposition.  SENSATION: Light touch and pinprick intact C5-T1 bilaterally.  REFLEXES: DTRs are 2+ bilaterally at biceps, triceps, and brachioradialis.  Aleman's and palmomental are negative bilaterally.  SPECIAL TESTS:  Tinel's & Phalen's are negative. Spurling's is negative bilaterally.    Assessment/Plan     Impression:  • Migraine  • Cervical myofascial Pain  • Cervicogenic Headaches    Plan:  • Continue Nurtec 75mg as needed for migraine.     • I have placed order for PT for her neck.  I do feel that this is  going to be the most beneficial to her at this time.  I did have an explanation with her about this and the importance of working with PT for her neck.     • She is OK to continue massage therapy as she can tolerate this.      • Continue Zanaflex 4mg nightly.      • Rrgular physical activity and a balanced diet recommended.     • Stress reduction may also be beneficial to her overall condition. Yoga, deep breathing, and other relaxing techniques would be beneficial.     The patient and I have discussed the plan of care and she is in full agreement at this time.     Follow-Up:  • Return in about 3 months (around 3/22/2022) for Headaches.      Nico Mayorga, DEEPTI  12/22/21  11:44 CST

## 2021-12-23 ENCOUNTER — OFFICE VISIT (OUTPATIENT)
Dept: GASTROENTEROLOGY | Facility: CLINIC | Age: 46
End: 2021-12-23

## 2021-12-23 VITALS
HEIGHT: 64 IN | DIASTOLIC BLOOD PRESSURE: 80 MMHG | OXYGEN SATURATION: 98 % | WEIGHT: 182 LBS | TEMPERATURE: 97.1 F | BODY MASS INDEX: 31.07 KG/M2 | SYSTOLIC BLOOD PRESSURE: 130 MMHG | HEART RATE: 64 BPM

## 2021-12-23 DIAGNOSIS — Z83.71 FAMILY HX COLONIC POLYPS: Primary | ICD-10-CM

## 2021-12-23 DIAGNOSIS — K21.9 GASTROESOPHAGEAL REFLUX DISEASE, UNSPECIFIED WHETHER ESOPHAGITIS PRESENT: ICD-10-CM

## 2021-12-23 DIAGNOSIS — K58.9 IRRITABLE BOWEL SYNDROME WITHOUT DIARRHEA: ICD-10-CM

## 2021-12-23 PROCEDURE — 99214 OFFICE O/P EST MOD 30 MIN: CPT | Performed by: NURSE PRACTITIONER

## 2021-12-23 RX ORDER — DICYCLOMINE HYDROCHLORIDE 10 MG/1
10 CAPSULE ORAL EVERY 6 HOURS PRN
Qty: 60 CAPSULE | Refills: 2 | Status: SHIPPED | OUTPATIENT
Start: 2021-12-23

## 2021-12-23 NOTE — PROGRESS NOTES
[facility] OFF[facility]    12/23/2021    Allison Wilkins   1975    Primary Physician: Rodney George MD    Chief Complaint   Patient presents with   • Irritable Bowel Syndrome   gerd.       HISTORY OF PRESENT ILLNESS:     Allison Wilkins is a 46 y.o. female presents with IBS. This is stable on bentyl. She is on bentyl with good control. She needs refill. She has bm every other day. No weight loss. No n/v or fever.       GERD  This is stable. She is on protonix daily with good control. She gests refill from pcp.  No dysphagia. No n/v.         Last egd 11/2019.  Last colonoscopy 2013.     Father had colon polyps.   Cousin had colon cancer.       Past Medical History:   Diagnosis Date   • Stewart syndrome    • Disease of thyroid gland    • Eosinophilic esophagitis    • GERD (gastroesophageal reflux disease)    • Heavy periods     with pelvic pain   • Hemorrhoids    • Hypertension    • IBS (irritable bowel syndrome)    • PONV (postoperative nausea and vomiting)        Past Surgical History:   Procedure Laterality Date   • CHOLECYSTECTOMY     • DILATION AND CURETTAGE, DIAGNOSTIC / THERAPEUTIC     • ENDOSCOPY N/A 12/29/2016    Procedure: ESOPHAGOGASTRODUODENOSCOPY WITH ANESTHESIA;  Surgeon: Theodore Traylor MD;  Location: [facility] ENDOSCOPY;  Service:    • ENDOSCOPY N/A 11/8/2019    Procedure: ESOPHAGOGASTRODUODENOSCOPY WITH ANESTHESIA;  Surgeon: Theodore Traylor MD;  Location: [facility] ENDOSCOPY;  Service: Gastroenterology   • ENDOSCOPY AND COLONOSCOPY  12/04/2013   • TOTAL LAPAROSCOPIC HYSTERECTOMY N/A 12/21/2017    Procedure: TOTAL LAPAROSCOPIC HYSTERECTOMY WITH DAVINCI SI ROBOT;  Surgeon: Joyce Mcfadden MD;  Location: [facility] OR;  Service:    • TUBAL ABDOMINAL LIGATION         Outpatient Medications Marked as Taking for the 12/23/21 encounter (Office Visit) with Tashia Bahena APRN   Medication Sig Dispense Refill   • cetirizine (zyrTEC) 10 MG tablet Take 10 mg by mouth  Daily.     • dicyclomine (BENTYL) 10 MG capsule Take 1 capsule by mouth Every 6 (Six) Hours As Needed (prn). 60 capsule 2   • FLUoxetine (PROzac) 20 MG capsule Take 20 mg by mouth Daily.     • hydroCHLOROthiazide (HYDRODIURIL) 25 MG tablet Take 25 mg by mouth Daily.     • levothyroxine (SYNTHROID, LEVOTHROID) 175 MCG tablet Take 175 mcg by mouth Daily.     • nystatin-triamcinolone (MYCOLOG) 569352-6.1 UNIT/GM-% ointment As Needed.     • ondansetron ODT (ZOFRAN-ODT) 4 MG disintegrating tablet Place 4 mg on the tongue Every 8 (Eight) Hours As Needed for Nausea.     • pantoprazole (PROTONIX) 40 MG EC tablet Take 40 mg by mouth Daily.     • Rimegepant Sulfate (NURTEC) 75 MG tablet dispersible tablet Take 1 tablet by mouth Take As Directed. TAKE ONE TABLET AT ONSET OF MIGRAINE. DO NOT REPEAT FOR 24 HOURS. 8 tablet 6   • sucralfate (CARAFATE) 1 g tablet Take 1 g by mouth Daily As Needed.     • SUMAtriptan (IMITREX) 50 MG tablet Take 50 mg by mouth As Needed.     • tiZANidine (ZANAFLEX) 4 MG tablet Take 1 tablet by mouth At Night As Needed for Muscle Spasms. 30 tablet 0   • [DISCONTINUED] dicyclomine (BENTYL) 10 MG capsule Take 1 capsule by mouth Every 6 (Six) Hours As Needed (prn). 60 capsule 0       No Known Allergies    Social History     Socioeconomic History   • Marital status:    Tobacco Use   • Smoking status: Never Smoker   • Smokeless tobacco: Never Used   Substance and Sexual Activity   • Alcohol use: Yes     Comment: just occassional every 3-4 months   • Drug use: No   • Sexual activity: Defer       Family History   Problem Relation Age of Onset   • Diverticulitis Mother    • Colon polyps Maternal Grandmother    • Colon cancer Neg Hx        Review of Systems   Constitutional: Negative for chills, fever and unexpected weight change.   Respiratory: Negative for cough, shortness of breath and wheezing.    Cardiovascular: Negative for chest pain and palpitations.   Gastrointestinal: Negative for abdominal  "distention, abdominal pain, anal bleeding, blood in stool, constipation, diarrhea, nausea and vomiting.        Vitals:    12/23/21 0929   BP: 130/80   Pulse: 64   Temp: 97.1 °F (36.2 °C)   SpO2: 98%   Weight: 82.6 kg (182 lb)   Height: 162.6 cm (64\")      Body mass index is 31.24 kg/m².    Physical Exam  Vitals reviewed.   Constitutional:       General: She is not in acute distress.  Cardiovascular:      Rate and Rhythm: Normal rate and regular rhythm.      Heart sounds: Normal heart sounds.   Pulmonary:      Effort: Pulmonary effort is normal.      Breath sounds: Normal breath sounds.   Abdominal:      General: Bowel sounds are normal. There is no distension.      Palpations: Abdomen is soft.      Tenderness: There is no abdominal tenderness.   Skin:     General: Skin is warm and dry.   Neurological:      Mental Status: She is alert.         Results for orders placed or performed during the hospital encounter of 07/09/21   POC Creatinine    Specimen: Blood   Result Value Ref Range    Creatinine 0.70 0.60 - 1.30 mg/dL           ASSESSMENT AND PLAN    Assessment/Plan     Diagnoses and all orders for this visit:    1. Family hx colonic polyps (Primary)    2. Irritable bowel syndrome without diarrhea    3. Gastroesophageal reflux disease, unspecified whether esophagitis present    Other orders  -     dicyclomine (BENTYL) 10 MG capsule; Take 1 capsule by mouth Every 6 (Six) Hours As Needed (prn).  Dispense: 60 capsule; Refill: 2            In regards to family history of colon polyps  I would recommend colonoscopy now. She is agreeable however she wants to wait until after the first of the year. She will contact us when ready.           In regards to IBS, this is stable. She takes bentyl prn that does help. I will refill bentyl. Ov 1 year.      In regards to gerd, this is stable. I would recommend continue protonix daily. She gets refill from her pcp.            Colonoscopy   All risks, benefits, alternatives, and " indications of colonoscopy procedure have been discussed with the patient. Risks to include perforation of the colon requiring possible surgery or colostomy, risk of bleeding from biopsies or removal of colon tissue, possibility of missing a colon polyp or cancer, or adverse drug reaction.  Benefits to include the diagnosis and management of disease of the colon and rectum. Alternatives to include barium enema, radiographic evaluation, lab testing or no intervention. Pt verbalizes understanding and agrees.                      DEEPTI Pacheco

## 2022-01-07 ENCOUNTER — TREATMENT (OUTPATIENT)
Dept: PHYSICAL THERAPY | Facility: CLINIC | Age: 47
End: 2022-01-07

## 2022-01-07 DIAGNOSIS — G44.209 TENSION HEADACHE: ICD-10-CM

## 2022-01-07 DIAGNOSIS — M54.2 CERVICAL PAIN: Primary | ICD-10-CM

## 2022-01-07 PROCEDURE — 97140 MANUAL THERAPY 1/> REGIONS: CPT

## 2022-01-07 PROCEDURE — 97161 PT EVAL LOW COMPLEX 20 MIN: CPT

## 2022-01-07 NOTE — PROGRESS NOTES
Physical Therapy Initial Evaluation and Plan of Care    Patient: Allison Wilkins               : 1975  Visit Date: 2022  Referring practitioner: DEEPTI Jo  Date of Initial Visit: 2022  Patient seen for 1 sessions    Visit Diagnoses:    ICD-10-CM ICD-9-CM   1. Cervical pain  M54.2 723.1   2. Tension headache  G44.209 307.81     Past Medical History:   Diagnosis Date   • Stewart syndrome    • Disease of thyroid gland    • Eosinophilic esophagitis    • GERD (gastroesophageal reflux disease)    • Heavy periods     with pelvic pain   • Hemorrhoids    • Hypertension    • IBS (irritable bowel syndrome)    • PONV (postoperative nausea and vomiting)      Past Surgical History:   Procedure Laterality Date   • CHOLECYSTECTOMY     • DILATION AND CURETTAGE, DIAGNOSTIC / THERAPEUTIC     • ENDOSCOPY N/A 2016    Procedure: ESOPHAGOGASTRODUODENOSCOPY WITH ANESTHESIA;  Surgeon: Theodore Traylor MD;  Location: [facility] ENDOSCOPY;  Service:    • ENDOSCOPY N/A 2019    Procedure: ESOPHAGOGASTRODUODENOSCOPY WITH ANESTHESIA;  Surgeon: Theodore Traylor MD;  Location: [facility] ENDOSCOPY;  Service: Gastroenterology   • ENDOSCOPY AND COLONOSCOPY  2013   • TOTAL LAPAROSCOPIC HYSTERECTOMY N/A 2017    Procedure: TOTAL LAPAROSCOPIC HYSTERECTOMY WITH DAVINCI SI ROBOT;  Surgeon: Joyce Mcfadden MD;  Location: [facility] OR;  Service:    • TUBAL ABDOMINAL LIGATION           SUBJECTIVE     Subjective Evaluation    History of Present Illness    Subjective comment: Allison participated in PT summer 2021 for neck pain and headaches, she returns for same problem. 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 Following returning to work, her HAs increased and ROM decreased. She has continued to with some stretches and exercises since previous POC. History of concussion 2021, she was not knocked unconscious. She feels  her balance has worsened, sensation of random lateral LOB.    Patient Occupation: [employer]  Pain  Current pain rating: 3  At best pain ratin  At worst pain ratin (worse first thing in AM and before going to bed in PM)  Location: B neck   Quality: dull ache and sharp (Denies N/T)  Relieving factors: heat and medications (TENS)  Exacerbated by: Rotating neck     Social Support  Lives in: one-story house  Lives with: spouse, adult children and young children    Hand dominance: right    Treatments  Previous treatment: physical therapy  Current treatment: chiropractic  Patient Goals  Patient goals for therapy: decreased pain         Outcome Measure:   PT G-Codes  Outcome Measure Options: Neck Disability Index (NDI)  Neck Disability Index Score: 10    OBJECTIVE     Objective          Static Posture     Head  Forward.    Shoulders  Rounded.    Palpation   Left   Hypertonic in the cervical paraspinals and upper trapezius.   Tenderness of the cervical paraspinals and upper trapezius.     Right   Hypertonic in the cervical paraspinals and upper trapezius. Tenderness of the cervical paraspinals and upper trapezius.     Tenderness   Cervical Spine   Tenderness in the spinous process, left transverse process and right transverse process.     Active Range of Motion   Cervical/Thoracic Spine   Cervical    Left rotation: 43 degrees   Right rotation: 48 degrees     Passive Range of Motion     Additional Passive Range of Motion Details  Cervical unilateral and central PA tenderness and hypomobility, most notable C4-6    Strength/Myotome Testing     Left Shoulder     Planes of Motion   Flexion: 5   Abduction: 5     Isolated Muscles   Lower trapezius: 3+   Middle trapezius: 4-     Right Shoulder     Planes of Motion   Flexion: 5   Abduction: 5     Isolated Muscles   Lower trapezius: 3+   Middle trapezius: 4-     Left Elbow   Flexion: 5  Extension: 5    Right Elbow   Flexion: 5  Extension: 5    Left Wrist/Hand   Wrist  extension: 5  Wrist flexion: 5    Right Wrist/Hand   Wrist extension: 5  Wrist flexion: 5    Tests   Cervical     Left   Negative cervical distraction and Spurling's sign.     Right   Positive Spurling's sign.   Negative cervical distraction.         Therapy Education/Self Care 93351   Details: Educated patient on anatomy of spine, physical therapy plan of care and postural activities (scap retraction, chin tuck)    Given postural retraining   Progress: New   Education provided to:  Patient   Level of understanding Verbalized and Demonstrated   Timed Minutes      Manual Therapy     79255  Comments   Prone thoracic extension PA Grade 2-3, sustained   Supine suboccipital release tender   Supine cervical distraction with lateral flexion     B cervical upglides at C5-6 Oscillating    B UT STM, supine     Timed Minutes 30          Total Timed Treatment:     30   mins  Total Time of Visit:            70   mins    ASSESSMENT/PLAN     GOALS:  Goals                                          Progress Note due by 2/7/2022                                                      Recert due by 4/6/2022   STG by: 3 weeks Comments Date Status   Patient will demonstrate >60 deg B cervical rotation with minimal increase in pain       Patient will demonstrate improved thoracic mobility       Patient will demonstrate ability to perform 10 consecutive chin tucks for improved stabilization       Patient will require no more than 1-2 cues for upright posture during exercises       LTG by: 6 weeks       Patient will demonstrate independence with comprehensive HEP      Patient will demonstrate 4+/5 B postural strength       Patient will report average pain being <2/10 for 1 week       Patient will score <5 on NDI                    Assessment & Plan     Assessment  Impairments: abnormal muscle firing, abnormal muscle tone, abnormal or restricted ROM, activity intolerance, impaired physical strength, lacks appropriate home exercise program and  pain with function  Functional Limitations: uncomfortable because of pain and sitting  Assessment details: Ms. Wilkins presented to physical therapy today with a chief complaint of ongoing neck and headache pain. She was seen previously by PT and responded well. Physical examination revealed decreased cervical ROM, cervical/thoracic joint hypomobility, postural weakness and decreased awareness of posture. Special testing was positive for pain on the R cervical spine, however no radicular symptoms present at this time. She has worked as a dental hygienist for 28 years, therefore her postural awareness has been poor due to the nature of her work. She is transitioning jobs currently in hopes that it will be less strain on her body. She did report history of concussion last year, therefore we will continue to assess to see if she might be dealing with long haul post concussion syndrome. She will benefit from skilled therapy services to address her deficits and improve her functional mobility. Thank you for this referral.   Prognosis: good    Plan  Therapy options: will be seen for skilled therapy services  Planned modality interventions: dry needling, low level laser therapy, TENS and traction  Planned therapy interventions: manual therapy, neuromuscular re-education, spinal/joint mobilization, home exercise program, body mechanics training, stretching, therapeutic activities, strengthening, soft tissue mobilization, postural training and balance/weight-bearing training  Frequency: 2x week  Duration in visits: 12  Duration in weeks: 6  Treatment plan discussed with: patient  Plan details: Consider addressing static/dynamic balance to see if there might be post concussion involvement. Focus early on address cervical/thoracic pain and joint mobility with manual therapy. Patient is interested in dry needling. Progress with postural strengthening and bolster HEP.         SIGNATURE: Tammy Avery, PT DPT, License #:  [id number]  Electronically Signed on 1/7/2022    Initial Certification  Certification Period: 1/7/2022 through 4/6/2022  I certify that the therapy services are furnished while this patient is under my care.  The services outlined above are required by this patient, and will be reviewed every 90 days.     PHYSICIAN:     Nico Mayorga, DEEPTI______________________________________DATE: _________     Please sign and return via fax to [phone].   Thank you so much for letting us work with Allison. I appreciate your letting us work with your patients. If you have any questions or concerns, please don't hesitate to contact me.          [address] [address]  [phone]

## 2022-01-19 ENCOUNTER — TREATMENT (OUTPATIENT)
Dept: PHYSICAL THERAPY | Facility: CLINIC | Age: 47
End: 2022-01-19

## 2022-01-19 DIAGNOSIS — G44.209 TENSION HEADACHE: ICD-10-CM

## 2022-01-19 DIAGNOSIS — M54.2 CERVICAL PAIN: Primary | ICD-10-CM

## 2022-01-19 PROCEDURE — 97140 MANUAL THERAPY 1/> REGIONS: CPT

## 2022-01-19 PROCEDURE — 97112 NEUROMUSCULAR REEDUCATION: CPT

## 2022-01-19 NOTE — PROGRESS NOTES
Physical Therapy Treatment Note    Patient: Allison Wilkins                                                                     Visit Date: 2022  :     1975    Referring practitioner:    DEEPTI Jo  Date of Initial Visit:          Type: THERAPY  Noted: 2022    Patient seen for 2 sessions    Visit Diagnoses:    ICD-10-CM ICD-9-CM   1. Cervical pain  M54.2 723.1   2. Tension headache  G44.209 307.81     SUBJECTIVE     Subjective Today she can tell her neck is tight. She hasn't had a head ache today but has had one the past two days waking up. She is more aware of her upper traps. She uses Bio[employer]ze. She purchased a TENS unit but it feels more shocking and would like for us to look at it. She went to the chiropractor yesterday. She tripped over her dog gait last Thursday. One leg was already over and when she went to step over with the other LE, her robe got stuck and she knocked the gait down and landed on her knees. Her knees are sore.     PAIN: 2-3/10 B UT      OBJECTIVE     Objective      Neuromuscular Reeducation     87193 Comments   NBOS for max time 30 sec+   NBOS EC for max time 30 sec+    B tandem stance for max time 30 sec+   B SLS for max time 30 sec+   SWENSON balance assessment perfect score    Neurocom Assessment: Limits of stability perfect           Timed Minutes 25     Manual Therapy     56612  Comments   STM w/ massage cream to B UT/LS and suboccipitals     Prone thoracic and CT extension mobilizations     Manual cervical distraction w/ bilateral side bends, sustained    PROM B cervical rotation    Suboccipital release    PROM B AA cervical rotation in flexion        Timed Minutes 30     Therapy Education/Self Care 47108   Details:    Given    Progress: Reinforced   Education provided to:  Patient   Level of understanding Verbalized   Timed Minutes        Total Timed Treatment:     55   mins  Total Time of  Visit:             60   mins         ASSESSMENT/PLAN     GOALS  Goals                                          Progress Note due by 2/7/2022                                                      Recert due by 4/6/2022   STG by: 3 weeks Comments Date Status   Patient will demonstrate >60 deg B cervical rotation with minimal increase in pain          Patient will demonstrate improved thoracic mobility   Very tight over her CT  1/19/22     Patient will demonstrate ability to perform 10 consecutive chin tucks for improved stabilization          Patient will require no more than 1-2 cues for upright posture during exercises          LTG by: 6 weeks          Patient will demonstrate independence with comprehensive HEP         Patient will demonstrate 4+/5 B postural strength          Patient will report average pain being <2/10 for 1 week          Patient will score <5 on NDI           Assessment/Plan     ASSESSMENT: Today was Allison's first session following her initial evaluation therefore no goals have been assessed at this time. It was recommended to assess her balance today to see if there could be any post-concussion involvement, therefore, the first portion of treatment was centered around her balance and WSing abilities. She performed very well with all activities and had no LOB. She had occasional Sway and hip strategy on the neurocom, however she was able to self-correct without assistance. We then focused on decreasing her soft tissue restrictions and increasing her cervical and thoracic mobility. Her CT was very hypomobile and her cervical musculature was heavily guarded.     PLAN: Prioritize decreasing her forward head posture. Consider addressing possible post-concussion involvement with VOR tasks and busy visual stimuli.     SIGNATURE: Nancy Goldstein PTA, License #: [id number]  Electronically Signed on 1/19/2022        [address] [place] [address]. [address]  [phone]

## 2022-01-21 ENCOUNTER — TREATMENT (OUTPATIENT)
Dept: PHYSICAL THERAPY | Facility: CLINIC | Age: 47
End: 2022-01-21

## 2022-01-21 DIAGNOSIS — M54.2 CERVICAL PAIN: Primary | ICD-10-CM

## 2022-01-21 DIAGNOSIS — G44.209 TENSION HEADACHE: ICD-10-CM

## 2022-01-21 PROCEDURE — 97112 NEUROMUSCULAR REEDUCATION: CPT

## 2022-01-21 NOTE — PROGRESS NOTES
"                                                                Physical Therapy Treatment Note    Patient: Allison Wilkins                                                                     Visit Date: 2022  :     1975    Referring practitioner:    DEEPTI Jo  Date of Initial Visit:          Type: THERAPY  Noted: 2022    Patient seen for 3 sessions    Visit Diagnoses:    ICD-10-CM ICD-9-CM   1. Cervical pain  M54.2 723.1   2. Tension headache  G44.209 307.81     SUBJECTIVE     Subjective She thinks she may have pinched her neck or something when bending over to wipe her dog's paws. She thinks this is better now. HA have always existed but intensity and frequency increased after the fall. She reports sometimes she'll stand from sitting or lying down and it will make her feel dizzy. Sometimes when side stepping, she'll feel a little \"off center\" and will lose her balance, she won't fall. When she fell, she hit the R side of her head. She reports difficulties with both long and short term memory, has to make herself notes more now than she ever did to remember things. She also reports Nico (her neurologist) is puzzled by her presentation as she is not presenting as a pt with chronic migraines.     PAIN: -2/10 > 1/10     OBJECTIVE     Objective videos link: [email or web address]     Neuromuscular Reeducation     96365 Comments   Rhomberg stance while watching vertical stripes video 30 sec -- inc blink rate   B tandem stance while watching horizontal and vertical moving stripes video    B SLS with \"snow\" video  Balance and stability okay, blink rate slight inc   B SLS with checkered video Inc blink rate and dizziness reported, denied nausea or inc HA   A/P and M/L, maintaining midline on wobble board while watching driving video Instability to R, slight inc in blink rate   Wobble board M/L watching 3D checkerboard ([employer] video) LOB to R only " "-- [email or web address]/[email or web address]   Side stepping with quick change in direction  No flared symptoms   Attempted find the bell cancellation on BITS Unable to find matthews with peripheral vision    Assessed peripheral vision        Timed Minutes 38     Therapy Education/Self Care 36098   Details:    Given    Progress: Reinforced   Education provided to:  Patient   Level of understanding Verbalized   Timed Minutes        Total Timed Treatment:     38   mins  Total Time of Visit:             39   mins         ASSESSMENT/PLAN     GOALS  Goals                                     Progress Note due by 2/7/2022                                                      Recert due by 4/6/2022   STG by: 3 weeks Comments Date Status   Patient will demonstrate >60 deg B cervical rotation with minimal increase in pain      ongoing   Patient will demonstrate improved thoracic mobility   Very tight over her CT 1/19/22 ongoing   Patient will demonstrate ability to perform 10 consecutive chin tucks for improved stabilization      ongoing   Patient will require no more than 1-2 cues for upright posture during exercises      ongoing   LTG by: 6 weeks          Patient will demonstrate independence with comprehensive HEP     ongoing   Patient will demonstrate 4+/5 B postural strength      ongoing   Patient will report average pain being <2/10 for 1 week   pain has been better recently with pain, is more sore today than anything.  1/21/22 ongoing   Patient will score <5 on NDI  10 on 1/7/22 1/7/22 ongoing     Assessment/Plan     ASSESSMENT: Allison demonstrated increased blink rate with change in direction and reports occasionally feeling \"off center\" when stepping laterally to R. Her DOTY frequency and intensity has increased and reports memory deficits since the fall. She demonstrated deficits laterally to R vs L and during peripheral vision screening today, pt demonstrated complete lack of peripheral vision in all quadrants. 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    PLAN: Continue to challenge her balance with sudden change in motion and address muscle guarding as needed.     SIGNATURE: Katiana Birch PTA, License #: [id number]    Electronically Signed on 1/21/2022        [address] [place] [address]. [address]  [phone]

## 2022-01-28 ENCOUNTER — TREATMENT (OUTPATIENT)
Dept: PHYSICAL THERAPY | Facility: CLINIC | Age: 47
End: 2022-01-28

## 2022-01-28 DIAGNOSIS — M54.2 CERVICAL PAIN: Primary | ICD-10-CM

## 2022-01-28 DIAGNOSIS — G44.209 TENSION HEADACHE: ICD-10-CM

## 2022-01-28 PROCEDURE — 97112 NEUROMUSCULAR REEDUCATION: CPT

## 2022-01-28 PROCEDURE — 97140 MANUAL THERAPY 1/> REGIONS: CPT

## 2022-01-28 NOTE — PROGRESS NOTES
Physical Therapy Treatment Note    Patient: Allison Wilkins                                                                     Visit Date: 2022  :     1975    Referring practitioner:    DEEPTI Jo  Date of Initial Visit:          Type: THERAPY  Noted: 2022    Patient seen for 4 sessions    Visit Diagnoses:    ICD-10-CM ICD-9-CM   1. Cervical pain  M54.2 723.1   2. Tension headache  G44.209 307.81     SUBJECTIVE     Subjective She reports her shoulder is a little stiff this AM. She has caught herself scrunching her neck while trying to talk on the phone but has been able to correct it. She has had to take less Ibuprofen this AM.      PAIN: 2-3/10 > 1/10     OBJECTIVE     Objective videos link: [email or web address]     Neuromuscular Reeducation     18076 Comments   Assessed peripheral vision Unable to properly utilize peripheral vision in lower quadrant bilaterally    B tandem stance ball tosses against mirror in // bars with visual tracking on tie dye ball  2 x 10 ea -- inc blink rate   Rolling ball across tx table with visual tracking, pt seated, with yellow/black and black/white ball  1 x 10 ea -- difficulty with black / white   Quadruped cervical flex/ext 1 x 10    Contralateral dribbling with black/white ball  3 x 10 -- min difficulty    Ball tosses with black/white ball against wall with B lateral stepping with visual fixation 2 x 10 ea direction -- inc blink rate to R   Double ball toss with RWB tie dye and yellow/blk balls  2 x 10    Rolling on ground with RWB tie dye ball with visual fixation and change in head position 1 x 10        Timed Minutes 25     Manual Therapy     87690  Comments   IASTM with new edge tool, prone B UT    Cervical rotation AROM following manual R: 65 deg and L: 47 deg       Timed Minutes 13     Therapy Education/Self Care 30286   Details: Consider  contact dr about opthalmology referral for peripheral vision   Given symptom relief   Progress: Reinforced   Education provided to:  Patient   Level of understanding Verbalized   Timed Minutes      Total Timed Treatment:     43   mins  Total Time of Visit:             45   mins         ASSESSMENT/PLAN     GOALS  Goals                                     Progress Note due by 2/7/2022                                                      Recert due by 4/6/2022   STG by: 3 weeks Comments Date Status   Patient will demonstrate >60 deg B cervical rotation with minimal increase in pain   L: 43 deg and R: 48 deg on 1/7/22 (eval)   R: 65 deg and L: 47 deg on 1/28/22 1/28/22 ongoing   Patient will demonstrate improved thoracic mobility   Very tight over her CT 1/19/22 ongoing   Patient will demonstrate ability to perform 10 consecutive chin tucks for improved stabilization      ongoing   Patient will require no more than 1-2 cues for upright posture during exercises      ongoing   LTG by: 6 weeks          Patient will demonstrate independence with comprehensive HEP     ongoing   Patient will demonstrate 4+/5 B postural strength      ongoing   Patient will report average pain being <2/10 for 1 week   pain has been better recently with pain, is more sore today than anything.  1/21/22 ongoing   Patient will score <5 on NDI  10 on 1/7/22 1/7/22 ongoing     Assessment/Plan     ASSESSMENT: Reassessed peripheral vision today and while she does still demonstrate lack of peripheral vision in B lower quadrants, she demonstrated adequate peripheral vision in B upper quadrants today. I encouraged her to seek out referral for opthalmology for evaluation regarding this, especially as she continues to fall d/t inability to see obstacles on ground. Increased blink rate and response to noxious stimuli tends to be very inconsistent with change in head position and visual tracking; however, it is consistent with sudden motion or change in  position laterally to R.     PLAN: Consider challenging her balance and vision with obstacles on ground and progress with postural strengthening. Consider addressing cervical mobility if time allows.      SIGNATURE: Katiana Brich PTA, License #: [id number]    Electronically Signed on 1/28/2022        [address] [place] [address]  [phone]

## 2022-02-10 ENCOUNTER — TREATMENT (OUTPATIENT)
Dept: PHYSICAL THERAPY | Facility: CLINIC | Age: 47
End: 2022-02-10

## 2022-02-10 DIAGNOSIS — M54.2 CERVICAL PAIN: Primary | ICD-10-CM

## 2022-02-10 DIAGNOSIS — G44.209 TENSION HEADACHE: ICD-10-CM

## 2022-02-10 PROCEDURE — 97110 THERAPEUTIC EXERCISES: CPT | Performed by: PHYSICAL THERAPIST

## 2022-02-10 PROCEDURE — 97140 MANUAL THERAPY 1/> REGIONS: CPT | Performed by: PHYSICAL THERAPIST

## 2022-02-10 PROCEDURE — 97535 SELF CARE MNGMENT TRAINING: CPT | Performed by: PHYSICAL THERAPIST

## 2022-02-10 NOTE — PROGRESS NOTES
Progress Note Addendum      Patient: Allison Wilkins           : 1975  Visit Date: 2/10/2022  Referring practitioner: DEEPTI Jo  Date of Initial Visit: Type: THERAPY  Noted: 2022  Patient seen for 5 sessions  Visit Diagnoses:    ICD-10-CM ICD-9-CM   1. Cervical pain  M54.2 723.1   2. Tension headache  G44.209 307.81       PT G-Codes  Outcome Measure Options: Neck Disability Index (NDI)  Neck Disability Index Score: 12  Clinical Progress: improved  Home Program Compliance: Yes  Progress toward previous goals: Partially Met  Prognosis to achieve goals: good    Objective     Therapy Education/Self Care 42465   Details: Reviewed symptoms and POC with patient. After discussion symptoms do not seem to directly correlate with post concussive syndrome. There is peripheral vision loss present. A potential differential diagnosis includes optic nerve irritation/involvement such as a neuritis. She is going to work on getting a referral to the ophthalmology group for additional inquiry.      Given symptom relief   Progress: Reinforced   Education provided to:  Patient   Level of understanding Verbalized   Timed Minutes         Assessment/Plan    I spent 15 minutes with the patient and Katiana Birch PTA and reviewed their progress and plan of care. The patient is in agreement with this plan.     SIGNATURE: Clinton Bahena PT DPT, License #: [id number]  Electronically Signed on 2/10/2022       Respiratory Abdominal Pain, N/V/D Abdominal Pain

## 2022-02-10 NOTE — PROGRESS NOTES
Physical Therapy Treatment Note and 30 Day Progress Note    Patient: Allison Wilkins                                                                     Visit Date: 2/10/2022  :     1975    Referring practitioner:    DEEPTI Jo  Date of Initial Visit:          Type: THERAPY  Noted: 2022    Patient seen for 5 sessions    Visit Diagnoses:    ICD-10-CM ICD-9-CM   1. Cervical pain  M54.2 723.1   2. Tension headache  G44.209 307.81     SUBJECTIVE     Subjective Her neck is a little sore from having to work. She is unsure about progress because while she thinks it's helping, she's been unable to come consistently d/t work schedule. She denies any recent falls but does have occasional trips/near falls d/t lacking peripheral vision. She still has occasional occurrences of feeling off center when stepping to R, but denies dizziness. Also denies any radicular symptoms. Middle finger does hurt, but feels this is more d/t overuse.      PAIN: 3/10 > 2/10    PT G-Codes  Outcome Measure Options: Neck Disability Index (NDI)  Neck Disability Index Score: 12  OBJECTIVE     Objective videos link: [email or web address]    Manual Therapy     [id number]  Comments   IASTM with new edge tool, prone B UT    Thoracic PA's rotational mobilization  Hypomobile throughout mid thoracic, significantly hypomobile throughout CT junction and pt reported pressure was painful       Timed Minutes 23     Therapeutic Exercises    [id number] Comments   Mid trap MMT 4-/5 [place]   Lower trap MMT  3+/5 [place]   Chin tucks in sitting Several cues to d/c cervical protrusion   Cervical rotation AROM  See goals table   Assess/educ on posture No cues required when sitting, not observed during exercise today.        Timed Minutes 8     Therapy Education/Self Care [id number]   Details: Consider contact dr about opthalmology referral for peripheral vision  and potential optic nerve involvement -- see Clinton Bahena PT, DPT note for more detail.    Given symptom relief   Progress: Reinforced   Education provided to:  Patient   Level of understanding Verbalized   Timed Minutes      Total Timed Treatment:     31   mins  Total Time of Visit:             45   mins         ASSESSMENT/PLAN     GOALS  Goals                                     Progress Note due by 3/12/2022                                                      Recert due by 4/6/2022   STG by: 3 weeks Comments Date Status   Patient will demonstrate >60 deg B cervical rotation with minimal increase in pain   L: 43 deg and R: 48 deg on 1/7/22 (eval)   L: 47 deg and R: 65 deg on 1/28/22   L: 55 deg and R: 63 deg on 2/10/22 2/10/22 ongoing   Patient will demonstrate improved thoracic mobility  Moderately hypomobile, lui throughout mid thoracic  2/10/22 ongoing   Patient will demonstrate ability to perform 10 consecutive chin tucks for improved stabilization   several cues to prevent protrusion.  2/10/22 ongoing   Patient will require no more than 1-2 cues for upright posture during exercises  Good posture throughout sitting today. 2/10/22 met   LTG by: 6 weeks          Patient will demonstrate independence with comprehensive HEP She reports daily compliance without issues. 2/10/22 ongoing   Patient will demonstrate 4+/5 B postural strength   mid trap: 4-/5 and lower trap: 3+/5 2/10/22 ongoing   Patient will report average pain being <2/10 for 1 week  5/10 at worst and 2-3/10 on avg.  2/10/22 ongoing   Patient will score <5 on NDI 10 on 1/7/22 (eval)   12 on 2/10/22  2/10/22 ongoing     Assessment/Plan     ASSESSMENT: Conferred with Clinton Bahena PT, DPT today regarding pt's presentation, symptoms, and progression (see attached note for further details). 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  Despite increased HA, her muscle guarding was improved and she demonstrated increased cervical rotation AROM. Her ability to consistently attend PT has limited her progression at this time; however, she is regularly compliant and working diligently to attend sessions as regularly as possible. She would benefit from continued skilled PT to continue monitoring her symptoms and to address other deficits as previously described.     PLAN:  Consider DN to fascial musculature. Continue to address cervicothoracic mobility and cervical/UT guarding as needed. Time permitting, continue to progress postural strengthening, reinforcing chin tucks. Continue to bolster and progress HEP as appropriate. Consider scheduling more visits.     SIGNATURE: Katiana Birch PTA, License #: [id number]    Electronically Signed on 2/10/2022        [address] [place] [address]. [id number]  [phone]

## 2022-03-24 ENCOUNTER — HOSPITAL ENCOUNTER (EMERGENCY)
Age: 47
Discharge: HOME | End: 2022-03-24
Attending: EMERGENCY MEDICINE
Payer: COMMERCIAL

## 2022-03-24 ENCOUNTER — APPOINTMENT (OUTPATIENT)
Dept: GENERAL RADIOLOGY | Age: 47
End: 2022-03-24
Payer: COMMERCIAL

## 2022-03-24 VITALS
RESPIRATION RATE: 17 BRPM | OXYGEN SATURATION: 97 % | TEMPERATURE: 98.1 F | HEIGHT: 64 IN | SYSTOLIC BLOOD PRESSURE: 148 MMHG | HEART RATE: 68 BPM | WEIGHT: 187.4 LBS | DIASTOLIC BLOOD PRESSURE: 97 MMHG | BODY MASS INDEX: 31.99 KG/M2

## 2022-03-24 DIAGNOSIS — R07.9 ACUTE CHEST PAIN: Primary | ICD-10-CM

## 2022-03-24 LAB
ALBUMIN SERPL-MCNC: 4.6 G/DL (ref 3.5–5.2)
ALP BLD-CCNC: 45 U/L (ref 35–104)
ALT SERPL-CCNC: 25 U/L (ref 5–33)
ANION GAP SERPL CALCULATED.3IONS-SCNC: 11 MMOL/L (ref 7–19)
AST SERPL-CCNC: 18 U/L (ref 5–32)
BASOPHILS ABSOLUTE: 0.1 K/UL (ref 0–0.2)
BASOPHILS RELATIVE PERCENT: 1.2 % (ref 0–1)
BILIRUB SERPL-MCNC: <0.2 MG/DL (ref 0.2–1.2)
BUN BLDV-MCNC: 9 MG/DL (ref 6–20)
CALCIUM SERPL-MCNC: 9.6 MG/DL (ref 8.6–10)
CHLORIDE BLD-SCNC: 98 MMOL/L (ref 98–111)
CO2: 27 MMOL/L (ref 22–29)
CREAT SERPL-MCNC: 0.6 MG/DL (ref 0.5–0.9)
D DIMER: <0.27 UG/ML FEU (ref 0–0.48)
EOSINOPHILS ABSOLUTE: 0.3 K/UL (ref 0–0.6)
EOSINOPHILS RELATIVE PERCENT: 4.6 % (ref 0–5)
GFR AFRICAN AMERICAN: >59
GFR NON-AFRICAN AMERICAN: >60
GLUCOSE BLD-MCNC: 100 MG/DL (ref 74–109)
HCT VFR BLD CALC: 37.5 % (ref 37–47)
HEMOGLOBIN: 12.2 G/DL (ref 12–16)
IMMATURE GRANULOCYTES #: 0 K/UL
LYMPHOCYTES ABSOLUTE: 2.2 K/UL (ref 1.1–4.5)
LYMPHOCYTES RELATIVE PERCENT: 38.2 % (ref 20–40)
MCH RBC QN AUTO: 30.2 PG (ref 27–31)
MCHC RBC AUTO-ENTMCNC: 32.5 G/DL (ref 33–37)
MCV RBC AUTO: 92.8 FL (ref 81–99)
MONOCYTES ABSOLUTE: 0.4 K/UL (ref 0–0.9)
MONOCYTES RELATIVE PERCENT: 7.6 % (ref 0–10)
NEUTROPHILS ABSOLUTE: 2.7 K/UL (ref 1.5–7.5)
NEUTROPHILS RELATIVE PERCENT: 48.2 % (ref 50–65)
PDW BLD-RTO: 12.4 % (ref 11.5–14.5)
PLATELET # BLD: 206 K/UL (ref 130–400)
PMV BLD AUTO: 11.6 FL (ref 9.4–12.3)
POTASSIUM REFLEX MAGNESIUM: 3.9 MMOL/L (ref 3.5–5)
PRO-BNP: 56 PG/ML (ref 0–450)
RBC # BLD: 4.04 M/UL (ref 4.2–5.4)
SODIUM BLD-SCNC: 136 MMOL/L (ref 136–145)
TOTAL PROTEIN: 7 G/DL (ref 6.6–8.7)
TROPONIN: <0.01 NG/ML (ref 0–0.03)
WBC # BLD: 5.6 K/UL (ref 4.8–10.8)

## 2022-03-24 PROCEDURE — 93005 ELECTROCARDIOGRAM TRACING: CPT | Performed by: EMERGENCY MEDICINE

## 2022-03-24 PROCEDURE — 85025 COMPLETE CBC W/AUTO DIFF WBC: CPT

## 2022-03-24 PROCEDURE — 36415 COLL VENOUS BLD VENIPUNCTURE: CPT

## 2022-03-24 PROCEDURE — 83880 ASSAY OF NATRIURETIC PEPTIDE: CPT

## 2022-03-24 PROCEDURE — 71045 X-RAY EXAM CHEST 1 VIEW: CPT

## 2022-03-24 PROCEDURE — 85379 FIBRIN DEGRADATION QUANT: CPT

## 2022-03-24 PROCEDURE — 80053 COMPREHEN METABOLIC PANEL: CPT

## 2022-03-24 PROCEDURE — 99283 EMERGENCY DEPT VISIT LOW MDM: CPT

## 2022-03-24 PROCEDURE — 84484 ASSAY OF TROPONIN QUANT: CPT

## 2022-03-24 ASSESSMENT — ENCOUNTER SYMPTOMS
NAUSEA: 0
SORE THROAT: 0
COUGH: 0
BLOOD IN STOOL: 0
FACIAL SWELLING: 0
CHOKING: 0
VOICE CHANGE: 0
CONSTIPATION: 0
APNEA: 0
DIARRHEA: 0
SINUS PRESSURE: 0
EYE DISCHARGE: 0
VOMITING: 0
SHORTNESS OF BREATH: 1
ABDOMINAL PAIN: 0

## 2022-03-24 ASSESSMENT — HEART SCORE: ECG: 0

## 2022-03-24 NOTE — Clinical Note
Deangelo Calles was seen and treated in our emergency department on 3/24/2022. She may return to work on 03/24/2022. If you have any questions or concerns, please don't hesitate to call.       Deborah Stockton MD

## 2022-03-24 NOTE — ED PROVIDER NOTES
[facility] EMERGENCY DEPT  eMERGENCY dEPARTMENT eNCOUnter      Pt Name: Jay Chamberlain  MRN: [id number]  [place] 1975  Date of evaluation: 3/24/2022  Provider: Renata Aguirre MD    [address] [place] [address]       Chief Complaint   Patient presents with    Chest Pain     left sided, worse with inspiration, improved now         HISTORY OF PRESENT ILLNESS   (Location/Symptom, Timing/Onset,Context/Setting, Quality, Duration, Modifying Factors, Severity)  Note limiting factors. Jay Chamberlain is a 51 y.o. female who presents to the emergency department evaluation of left-sided chest pain. 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 Walking the patient back to the front of the office. When she developed severe left-sided chest pain that took her breath away. And she had a difficult time taking a breath. Her heart rate was 124. And she was hypertensive they checked her pressure in the office. She was not dizzy the pain did not radiate. There was no nausea vomiting. It has improved. She does not smoke she does not take hormonal supplements. No history of PE or DVT. No fever chills. She is not Covid vaccinated she had Covid last August.  She was not eating or drinking at the time. There is been no recent injury that she can recall. She goes once a year to have her thyroid levels checked. That is coming up in May. She did see the doctor last week for some conjunctivitis which is resolved. Review of chart shows a stress test several years ago. It was normal.    The history is provided by the patient and medical records. NursingNotes were reviewed. REVIEW OF SYSTEMS    (2-9 systems for level 4, 10 or more for level 5)     Review of Systems   Constitutional: Negative for chills and fever. HENT: Negative for congestion, drooling, facial swelling, nosebleeds, sinus pressure, sore throat and voice change. Eyes: Negative for discharge.    Respiratory: Positive for shortness of breath (Was worse with taking a deep breath.). Negative for apnea, cough and choking. Cardiovascular: Positive for chest pain. Negative for palpitations and leg swelling. Gastrointestinal: Negative for abdominal pain, blood in stool, constipation, diarrhea, nausea and vomiting. Genitourinary: Negative for dysuria and enuresis. Musculoskeletal: Negative for joint swelling. Skin: Negative for rash and wound. Neurological: Negative for seizures and syncope. Psychiatric/Behavioral: Negative for behavioral problems, hallucinations and suicidal ideas. All other systems reviewed and are negative. A complete review of systems was performed and is negative except as noted above in the HPI. PAST MEDICAL HISTORY     Past Medical History:   Diagnosis Date    Abnormal glandular Papanicolaou smear of cervix     ASCUS 1990    Acid reflux     Fibroid     Hypothyroidism     Irritable bowel syndrome     Thyroid disease     Hypothyroid         SURGICAL HISTORY       Past Surgical History:   Procedure Laterality Date    CHOLECYSTECTOMY      COLONOSCOPY  1-2014, 2005    DILATION AND CURETTAGE OF UTERUS  10.21.2015    ENDOMETRIAL ABLATION  [id number]    HYSTERECTOMY  2017    HYSTERECTOMY, VAGINAL      [facility] retained ovaries    TUBAL LIGATION      UPPER GASTROINTESTINAL ENDOSCOPY           CURRENT MEDICATIONS       Previous Medications    CETIRIZINE (ZYRTEC) 10 MG TABLET    Take 10 mg by mouth daily. HYDROCHLOROTHIAZIDE (HYDRODIURIL) 25 MG TABLET    TAKE 1 TABLET BY MOUTH DAILY IN THE MORNING    LEVOTHYROXINE (SYNTHROID) 175 MCG TABLET    Take 175 mcg by mouth daily.     ONDANSETRON (ZOFRAN ODT) 4 MG DISINTEGRATING TABLET    Take 1 tablet by mouth every 8 hours as needed for Nausea or Vomiting    PANTOPRAZOLE SODIUM (PROTONIX PO)    Take by mouth    RIMEGEPANT SULFATE 75 MG TBDP    Take 75 mg by mouth    SUMATRIPTAN (IMITREX) 50 MG TABLET    Take 1 tablet by mouth once as needed for Migraine    TIZANIDINE (ZANAFLEX) 4 MG TABLET TAKE (1) TABLET BY MOUTH EVERY SIX HOURS AS NEEDED FOR PAIN. ALLERGIES     Patient has no known allergies. FAMILY HISTORY       Family History   Problem Relation Age of Onset    Other Father         Carotid blocked 2010    Thyroid Disease Mother     Heart Disease Brother 28        MI    Stroke Paternal Grandmother     Breast Cancer Neg Hx     Colon Cancer Neg Hx     Ovarian Cancer Neg Hx           SOCIAL HISTORY       Social History     Socioeconomic History    Marital status:      Spouse name: None    Number of children: None    Years of education: None    Highest education level: None   Occupational History     Employer: [employer]     Comment: full   Tobacco Use    Smoking status: Never Smoker    Smokeless tobacco: Never Used   Vaping Use    Vaping Use: Never used   Substance and Sexual Activity    Alcohol use: Yes     Comment: occasional    Drug use: No    Sexual activity: Yes   Other Topics Concern    None   Social History Narrative    None     Social Determinants of Health     Financial Resource Strain:     Difficulty of Paying Living Expenses: Not on file   Food Insecurity:     Worried About Running Out of Food in the Last Year: Not on file    Charlene of Food in the Last Year: Not on file   Transportation Needs:     Lack of Transportation (Medical): Not on file    Lack of Transportation (Non-Medical):  Not on file   Physical Activity:     Days of Exercise per Week: Not on file    Minutes of Exercise per Session: Not on file   Stress:     Feeling of Stress : Not on file   Social Connections:     Frequency of Communication with Friends and Family: Not on file    Frequency of Social Gatherings with Friends and Family: Not on file    Attends Bahai Services: Not on file    Active Member of Clubs or Organizations: Not on file    Attends Club or Organization Meetings: Not on file    Marital Status: Not on file   Intimate Partner Violence:     Fear of Current or Ex-Partner: Not on file    Emotionally Abused: Not on file    Physically Abused: Not on file    Sexually Abused: Not on file   Housing Stability:     Unable to Pay for Housing in the Last Year: Not on file    Number of [place] in the Last Year: Not on file    Unstable Housing in the Last Year: Not on file       SCREENINGS    Christo Coma Scale  Eye Opening: Spontaneous  Best Verbal Response: Oriented  Best Motor Response: Obeys commands  Christo Coma Scale Score: 15 Heart Score for chest pain patients  History: Slightly Suspicious  ECG: Normal  Patient Age: > 45 and < 65 years  *Risk factors for Atherosclerotic disease: Obesity  Risk Factors: 1 or 2 risk factors  Troponin: < 1X normal limit  Heart Score Total: 2      PHYSICAL EXAM    (up to 7 for level 4, 8 or more for level 5)     ED Triage Vitals [03/24/22 1221]   BP Temp Temp Source Pulse Resp SpO2 Height Weight   (!) 148/97 98.1 °F (36.7 °C) Oral 68 17 97 % 5' 4\" (1.626 m) 187 lb 6.4 oz (85 kg)       Physical Exam  Vitals and nursing note reviewed. Constitutional:       General: She is not in acute distress. Appearance: She is well-developed. HENT:      Head: Normocephalic and atraumatic. Right Ear: External ear normal.      Left Ear: External ear normal.   Eyes:      General: No scleral icterus. Conjunctiva/sclera: Conjunctivae normal.      Pupils: Pupils are equal, round, and reactive to light. Cardiovascular:      Rate and Rhythm: Normal rate and regular rhythm. Pulses: Normal pulses. Heart sounds: Normal heart sounds. No murmur heard. Pulmonary:      Effort: Pulmonary effort is normal. No respiratory distress. Breath sounds: Normal breath sounds. No wheezing, rhonchi or rales. Abdominal:      General: Bowel sounds are normal.      Palpations: Abdomen is soft. Musculoskeletal:         General: No swelling. Normal range of motion. Cervical back: Normal range of motion and neck supple.       Right lower leg: No edema. Left lower leg: No edema. Comments: Patient complains of some swelling. Skin:     General: Skin is warm and dry. Coloration: Skin is not jaundiced or pale. Neurological:      General: No focal deficit present. Mental Status: She is alert and oriented to person, place, and time. Psychiatric:         Mood and Affect: Mood normal.         Behavior: Behavior normal.         DIAGNOSTIC RESULTS     EKG: All EKG's are interpreted by the Emergency Department Physician who either signs or Co-signs this chart in the absence of a cardiologist.    Sinus rhythm rate 68. KS interval 165. QTc 455. There are some artifact but I see no ST elevation to indicate ischemia. T waves may be more flat laterally. She had a normal last EKG was 12/13/2017. RADIOLOGY:   Non-plain film images such as CT, Ultrasound and MRI are read by the radiologist. Plainradiographic images are visualized and preliminarily interpreted by the emergency physician with the below findings:    I have reviewed the images and results. Interpretation per the Radiologist below, if available at the time of this note:    XR CHEST PORTABLE   Final Result   No acute cardiopulmonary process. Signed by Dr Joao Montiel            ED BEDSIDE ULTRASOUND:   Performed by ED Physician - none    LABS:  Labs Reviewed   CBC WITH AUTO DIFFERENTIAL - Abnormal; Notable for the following components:       Result Value    RBC 4.04 (*)     MCHC 32.5 (*)     Neutrophils % 48.2 (*)     Basophils % 1.2 (*)     All other components within normal limits   COMPREHENSIVE METABOLIC PANEL W/ REFLEX TO MG FOR LOW K   TROPONIN   BRAIN NATRIURETIC PEPTIDE   D-DIMER, QUANTITATIVE       All other labs were within normal range or not returned as of this dictation.     EMERGENCY DEPARTMENT COURSE and DIFFERENTIALDIAGNOSIS/MDM:   Vitals:    Vitals:    03/24/22 1221   BP: (!) 148/97   Pulse: 68   Resp: 17   Temp: 98.1 °F (36.7 °C)   TempSrc: Oral SpO2: 97%   Weight: 187 lb 6.4 oz (85 kg)   Height: 5' 4\" (1.626 m)       MDM  Number of Diagnoses or Management Options  Acute chest pain  Diagnosis management comments: 3 PM patient continues to improve no further chest pain. Work-up is negative and reassuring here. She does not want to wait for a 2-hour repeat troponin. She does not have a headache that she took any Imitrex today my questioning was for concern of possible coronary artery spasm. She did take ibuprofen and some Excedrin generic. Advised to continue to monitor for symptoms and follow-up with her clinicians if she continues to have symptoms. May simply been a thoracic wall spasm muscle spasm. Nothing else indicates further work-up at this exact moment. And she safe to discharge. CONSULTS:  None    PROCEDURES:  Unless otherwise notedbelow, none     Procedures    FINAL IMPRESSION     1.  Acute chest pain          DISPOSITION/PLAN   DISPOSITION        PATIENT REFERRED TO:  @FUP@    DISCHARGE MEDICATIONS:  New Prescriptions    No medications on file          (Please note that portions of this note were completed with a voice recognition program.  Efforts were made to edit the dictations butoccasionally words are mis-transcribed.)    Stephanie Mcpherson MD (electronically signed)  AttendingEmergency Physician          Abel Leung MD  03/24/22 [id number]

## 2022-03-24 NOTE — Clinical Note
Nikole Bae was seen and treated in our emergency department on 3/24/2022. She may return to work on 03/24/2022. If you have any questions or concerns, please don't hesitate to call.       Meme Duckworth MD

## 2022-03-25 ENCOUNTER — OFFICE VISIT (OUTPATIENT)
Dept: NEUROLOGY | Facility: CLINIC | Age: 47
End: 2022-03-25

## 2022-03-25 VITALS
OXYGEN SATURATION: 98 % | WEIGHT: 182 LBS | SYSTOLIC BLOOD PRESSURE: 136 MMHG | HEIGHT: 64 IN | BODY MASS INDEX: 31.07 KG/M2 | RESPIRATION RATE: 17 BRPM | DIASTOLIC BLOOD PRESSURE: 82 MMHG | HEART RATE: 72 BPM

## 2022-03-25 DIAGNOSIS — H53.453: ICD-10-CM

## 2022-03-25 DIAGNOSIS — G43.009 MIGRAINE WITHOUT AURA AND WITHOUT STATUS MIGRAINOSUS, NOT INTRACTABLE: ICD-10-CM

## 2022-03-25 DIAGNOSIS — G44.86 CERVICOGENIC HEADACHE: Primary | ICD-10-CM

## 2022-03-25 DIAGNOSIS — M79.18 CERVICAL MYOFASCIAL PAIN SYNDROME: ICD-10-CM

## 2022-03-25 LAB
EKG P AXIS: 58 DEGREES
EKG P-R INTERVAL: 166 MS
EKG Q-T INTERVAL: 414 MS
EKG QRS DURATION: 84 MS
EKG QTC CALCULATION (BAZETT): 426 MS
EKG T AXIS: 45 DEGREES

## 2022-03-25 PROCEDURE — 99214 OFFICE O/P EST MOD 30 MIN: CPT | Performed by: NURSE PRACTITIONER

## 2022-03-25 RX ORDER — AMITRIPTYLINE HYDROCHLORIDE 25 MG/1
25 TABLET, FILM COATED ORAL NIGHTLY
Qty: 30 TABLET | Refills: 3 | Status: SHIPPED | OUTPATIENT
Start: 2022-03-25 | End: 2022-12-07

## 2022-03-25 RX ORDER — LEVOFLOXACIN 500 MG/1
TABLET, FILM COATED ORAL
COMMUNITY
Start: 2022-01-13 | End: 2022-03-25

## 2022-03-25 RX ORDER — TOBRAMYCIN 3 MG/ML
SOLUTION/ DROPS OPHTHALMIC
COMMUNITY
Start: 2022-03-16 | End: 2022-12-07

## 2022-03-25 NOTE — PROGRESS NOTES
Neurology Progress Note      Chief Complaint:    Headaches    Subjective     Subjective:  Allison Wilkins is a 46 y.o. female who presents today for follow-up of headaches.  She is routinely followed by Dr. Rodney George MD for primary care.  She was last seen by me on 12/22/2021 where she was ordered PT and continued on Nurtec 75mg.     She presents today stating that she has completed some PT but notes that her co pay was expensive and she couldn't continue at this time.  She does continue to note headache in the base of her skull in the occipital area approximately 4 times per week.  She continues to note migrainous headaches approximately 1-2 times per month.  She reports that she has continued to take some over-the-counter headache medications such as ibuprofen and BC powder which I do think may be contributory to worsening of her headaches.  I did advise that she should stop taking these medications.  She notes that Nurtec is able to alleviate her migrainous headaches in addition to her occipital headaches.  She continues to follow with chiropractic care and does continue to have neck pain and tenderness.  She is currently taking Zanaflex 4 mg nightly to help with muscle tension.    She notes that when she was in physical therapy her physical therapist noted she had some decreased visual acuity in her peripheral vision.  She is not complained of any visual changes in her past visits and this has not been observable on her examination historically.    She notes that she went to the [facility] ER with some chest pains yesterday.  She was fully evaluated with no concerning causes.    Past Medical History:   Diagnosis Date   • Stewart syndrome    • Disease of thyroid gland    • Eosinophilic esophagitis    • GERD (gastroesophageal reflux disease)    • Heavy periods     with pelvic pain   • Hemorrhoids    • Hypertension    • IBS (irritable bowel syndrome)    • PONV (postoperative nausea and vomiting)       Past Surgical History:   Procedure Laterality Date   • CHOLECYSTECTOMY     • DILATION AND CURETTAGE, DIAGNOSTIC / THERAPEUTIC     • ENDOSCOPY N/A 12/29/2016    Procedure: ESOPHAGOGASTRODUODENOSCOPY WITH ANESTHESIA;  Surgeon: Theodore Traylor MD;  Location: [facility] ENDOSCOPY;  Service:    • ENDOSCOPY N/A 11/8/2019    Procedure: ESOPHAGOGASTRODUODENOSCOPY WITH ANESTHESIA;  Surgeon: Theodore Traylor MD;  Location: [facility] ENDOSCOPY;  Service: Gastroenterology   • ENDOSCOPY AND COLONOSCOPY  12/04/2013   • TOTAL LAPAROSCOPIC HYSTERECTOMY N/A 12/21/2017    Procedure: TOTAL LAPAROSCOPIC HYSTERECTOMY WITH DAVINCI SI ROBOT;  Surgeon: Joyce Mcfadden MD;  Location: [facility] OR;  Service:    • TUBAL ABDOMINAL LIGATION       Family History   Problem Relation Age of Onset   • Diverticulitis Mother    • Colon polyps Maternal Grandmother    • Colon cancer Neg Hx      Social History     Tobacco Use   • Smoking status: Never Smoker   • Smokeless tobacco: Never Used   Substance Use Topics   • Alcohol use: Yes     Comment: just occassional every 3-4 months   • Drug use: No     Medications:  Current Outpatient Medications   Medication Sig Dispense Refill   • cetirizine (zyrTEC) 10 MG tablet Take 10 mg by mouth Daily.     • dicyclomine (BENTYL) 10 MG capsule Take 1 capsule by mouth Every 6 (Six) Hours As Needed (prn). 60 capsule 2   • FLUoxetine (PROzac) 20 MG capsule Take 20 mg by mouth Daily.     • hydroCHLOROthiazide (HYDRODIURIL) 25 MG tablet Take 25 mg by mouth Daily.     • levothyroxine (SYNTHROID, LEVOTHROID) 175 MCG tablet Take 175 mcg by mouth Daily.     • nystatin-triamcinolone (MYCOLOG) 565815-6.1 UNIT/GM-% ointment As Needed.     • ondansetron ODT (ZOFRAN-ODT) 4 MG disintegrating tablet Place 4 mg on the tongue Every 8 (Eight) Hours As Needed for Nausea.     • pantoprazole (PROTONIX) 40 MG EC tablet Take 40 mg by mouth Daily.     • Rimegepant Sulfate (NURTEC) 75 MG tablet dispersible tablet Take 1 tablet by mouth  Take As Directed. TAKE ONE TABLET AT ONSET OF MIGRAINE. DO NOT REPEAT FOR 24 HOURS. 8 tablet 6   • sucralfate (CARAFATE) 1 g tablet Take 1 g by mouth Daily As Needed.     • SUMAtriptan (IMITREX) 50 MG tablet Take 50 mg by mouth As Needed.     • tiZANidine (ZANAFLEX) 4 MG tablet Take 1 tablet by mouth At Night As Needed for Muscle Spasms. 30 tablet 0   • tobramycin 0.3 % solution ophthalmic solution      • amitriptyline (ELAVIL) 25 MG tablet Take 1 tablet by mouth Every Night. 30 tablet 3     No current facility-administered medications for this visit.     Current outpatient and discharge medications have been reconciled for the patient.  Reviewed by: DEEPTI Jo      Allergies:    Patient has no known allergies.    Review of Systems:   Review of Systems   Eyes: Positive for photophobia and visual disturbance.   Neurological: Positive for headache. Negative for tremors and syncope.   Psychiatric/Behavioral: Positive for stress. The patient is nervous/anxious.    All other systems reviewed and are negative.    Objective      Vital Signs  Heart Rate:  [72] 72  Resp:  [17] 17  BP: (136)/(82) 136/82    Physical Exam:  Physical Exam  Constitutional:       Appearance: Normal appearance.   HENT:      Head: Normocephalic.   Eyes:      Extraocular Movements: Extraocular movements intact.      Pupils: Pupils are equal, round, and reactive to light.   Cardiovascular:      Rate and Rhythm: Normal rate and regular rhythm.      Pulses: Normal pulses.   Pulmonary:      Effort: Pulmonary effort is normal.   Musculoskeletal:      Cervical back: Neck supple. Pain with movement present. Decreased range of motion.   Skin:     General: Skin is warm and dry.      Capillary Refill: Capillary refill takes less than 2 seconds.   Neurological:      Gait: Gait is intact.   Psychiatric:         Mood and Affect: Mood normal.       Neurologic Exam:  Mental Status:    -Awake. Alert. Oriented to person, place & time.  -No word finding  difficulties.  -No aphasia.  -No dysarthria.  -Follows simple commands.     CN II:  Full visual fields with with the exception of very minor bilateral peripheral vision changes.  She does state that she has had some changes in her prescription glasses recently and this may be attributable to this symptom.  Pupils equally reactive to light.  CN III, IV, VI:  Extraocular muscles function intact with no nystagmus.  CN V:  Facial sensory is symmetric.  CN VII:  Facial motor symmetric.  CN VIII:  Gross hearing intact bilaterally.  CN IX/X:  Palate elevates symmetrically.  CN XI:  Shoulder shrug symmetric.  CN XII:  Tongue is midline on protrusion.     Motor: (strength out of 5:  1= minimal movement, 2 = movement in plane of gravity, 3 = movement against gravity, 4 = movement against some resistance, 5 = full strength)     -5/5 in bilateral biceps, triceps, brachioradialis, wrist extensors and intrinsic muscles of the hand.    -5/5 in bilateral hip flexors, quadriceps, hamstrings, gastrocsoleus complex, anterior tibialis and extensor hallucis longus.       Deep Tendon Reflexes:  -Right              Biceps: 2+         Triceps: 2+      Brachioradialis: 2+              Patella: 2+       Ankle: 2+         Babinski:  negative  -Left              Biceps: 2+         Triceps: 2+      Brachioradialis: 2+              Patella: 2+       Ankle: 2+         Babinski:  negative    Tone (Modified Angelo Scale):  No appreciable increase in tone or rigidity noted.     Sensory:  -Intact to light touch, pinprick BUE (C5-T1) and BLE (L2-S1).     Coordination:  -Finger to nose intact BUEs  -Heel to shin intact BLEs  -No ataxia     Gait  -No signs of ataxia  -ambulates unassisted       Results Review:        MRI Brain With & Without Contrast (07/09/2021 13:12)  MRI Cervical Spine Without Contrast (07/09/2021 12:42)    Chart Review:  Progress Notes by Clinton Bahena PT DPT (02/10/2022 12:30)  Progress Notes by Katiana Birch PTA (02/10/2022  12:30)    Assessment/Plan     Impression:  · Migraine  · Cervical myofascial Pain  · Cervicogenic Headaches  • Peripheral Vision Changes    Plan:  • Will start amitriptyline 25mg nightly to help with headaches.  Discussed use with Prozac and potential for serotonin syndrome.  Discussed signs and symptoms.  If she were to develop these she is to stop medication and consult with our office immediately.  She states understanding.  She is on low-dose Prozac at 20 mg daily and I feel that our risk for serotonin syndrome is low at this time.    • Continue Nurtec 75 mg as needed for migraine.    • I do recommend she continue with physical therapy as I feel this will be the most beneficial treatments for her.    • Referral to ophthalmology for visual field testing to assess for her peripheral vision.    • She has had MRI brain performed on 7/9/2021.  If we continue to have symptoms then I would consider reimaging her her brain for further evaluation.  I did discuss this with the patient today.    • Recommend regular physical activity and balanced diet.    • Continue with stress reduction in treatments with PCP for anxiety.    The patient and I have discussed the plan of care and she is in full agreement at this time.     Follow-Up:  Return in about 2 months (around 5/25/2022) for Migraine follow-up.      DEEPTI Jo  03/25/22  12:44 CDT

## 2022-05-05 ENCOUNTER — DOCUMENTATION (OUTPATIENT)
Dept: PHYSICAL THERAPY | Facility: CLINIC | Age: 47
End: 2022-05-05

## 2022-05-05 DIAGNOSIS — M54.2 CERVICAL PAIN: Primary | ICD-10-CM

## 2022-05-05 DIAGNOSIS — G44.209 TENSION HEADACHE: ICD-10-CM

## 2022-05-05 NOTE — PROGRESS NOTES
I have reviewed the notes, assessments, and/or procedures performed by Katiana Birch PTA, I concur with her/his documentation of Allison Wilkins.

## 2022-05-05 NOTE — PROGRESS NOTES
Physical Therapy Discharge Summary    Patient: Allison Wilkins                                                                                     Today's Date: 2022  :     1975    Date of Initial Visit:          No linked episodes    Patient seen for Visit count could not be calculated. Make sure you are using a visit which is associated with an episode. sessions    Visit Diagnoses:    ICD-10-CM ICD-9-CM   1. Cervical pain  M54.2 723.1   2. Tension headache  G44.209 307.81       GOALS:  Goals                                     Progress Note due by 3/12/2022                                                      Recert due by 2022   STG by: 3 weeks Comments Date Status   Patient will demonstrate >60 deg B cervical rotation with minimal increase in pain   L: 43 deg and R: 48 deg on 22 (eval)   L: 47 deg and R: 65 deg on 22   L: 55 deg and R: 63 deg on 2/10/22 2/10/22 partially met   Patient will demonstrate improved thoracic mobility  Moderately hypomobile, lui throughout mid thoracic  2/10/22 partially met   Patient will demonstrate ability to perform 10 consecutive chin tucks for improved stabilization   several cues to prevent protrusion.  2/10/22 unmet   Patient will require no more than 1-2 cues for upright posture during exercises  Good posture throughout sitting today. 2/10/22 MET   LTG by: 6 weeks          Patient will demonstrate independence with comprehensive HEP She reports daily compliance without issues. 2/10/22 MET   Patient will demonstrate 4+/5 B postural strength   mid trap: 4-/5 and lower trap: 3+/5 2/10/22 unmet   Patient will report average pain being <2/10 for 1 week  5/10 at worst and 2-3/10 on avg.  2/10/22 unmet   Patient will score <5 on NDI 10 on 22 (eval)   12 on 2/10/22  2/10/22 unmet     DISCHARGE SUMMARY   Discharge date 2022   Dates of this episode 2022 through 2/10/2022   Number of visits on this episode 5   Reason for discharge patient did not  return to therapy   Outcomes achieved Refer to the goals table for specifics on goals   Discharge plan Continue with current home exercise program as instructed   Summary of care Patient began a new job and was unable to consistently attend PT. Patient cancelled all remaining apt and has not since returned.    Discharge instruction D/C home with comprehensive HEP.

## 2022-05-09 RX ORDER — HYDROCHLOROTHIAZIDE 25 MG/1
TABLET ORAL
Qty: 30 TABLET | Refills: 0 | Status: SHIPPED | OUTPATIENT
Start: 2022-05-09 | End: 2022-06-03 | Stop reason: SDUPTHER

## 2022-05-11 ENCOUNTER — TELEPHONE (OUTPATIENT)
Dept: NEUROLOGY | Facility: CLINIC | Age: 47
End: 2022-05-11

## 2022-05-11 NOTE — TELEPHONE ENCOUNTER
Provider: CHRISTY GARCIA  Caller: PATIENT  Relationship to Patient: SELF  Pharmacy: NA  Phone Number: [phone]  Reason for Call: PATIENT STATES SHE WILL BE COMING TO  [facility] SAMPLES TODAY. I CONFIRMED WITH MARCEL IN OFFICE WE HAVE SAMPLES.   When was the patient last seen: 3-25-22

## 2022-06-03 ENCOUNTER — OFFICE VISIT (OUTPATIENT)
Dept: OBGYN CLINIC | Age: 47
End: 2022-06-03
Payer: COMMERCIAL

## 2022-06-03 VITALS
BODY MASS INDEX: 32.1 KG/M2 | WEIGHT: 187 LBS | SYSTOLIC BLOOD PRESSURE: 126 MMHG | HEART RATE: 63 BPM | DIASTOLIC BLOOD PRESSURE: 87 MMHG

## 2022-06-03 DIAGNOSIS — N39.46 MIXED STRESS AND URGE URINARY INCONTINENCE: ICD-10-CM

## 2022-06-03 DIAGNOSIS — Z12.31 ENCOUNTER FOR SCREENING MAMMOGRAM FOR MALIGNANT NEOPLASM OF BREAST: ICD-10-CM

## 2022-06-03 DIAGNOSIS — Z12.39 ENCOUNTER FOR SCREENING BREAST EXAMINATION: ICD-10-CM

## 2022-06-03 DIAGNOSIS — Z01.419 WELL WOMAN EXAM WITH ROUTINE GYNECOLOGICAL EXAM: Primary | ICD-10-CM

## 2022-06-03 PROCEDURE — 99396 PREV VISIT EST AGE 40-64: CPT | Performed by: NURSE PRACTITIONER

## 2022-06-03 RX ORDER — AMITRIPTYLINE HYDROCHLORIDE 25 MG/1
TABLET, FILM COATED ORAL
COMMUNITY
Start: 2022-03-25

## 2022-06-03 RX ORDER — HYDROCHLOROTHIAZIDE 25 MG/1
TABLET ORAL
Qty: 90 TABLET | Refills: 3 | Status: SHIPPED | OUTPATIENT
Start: 2022-06-03

## 2022-06-03 NOTE — PATIENT INSTRUCTIONS
Patient Education        Well Visit, Ages 25 to 48: Care Instructions  Overview     Well visits can help you stay healthy. Your doctor has checked your overall health and may have suggested ways to take good care of yourself. Your doctor also may have recommended tests. At home, you can help prevent illness withhealthy eating, regular exercise, and other steps. Follow-up care is a key part of your treatment and safety. Be sure to make and go to all appointments, and call your doctor if you are having problems. It's also a good idea to know your test results and keep alist of the medicines you take. How can you care for yourself at home?  Get screening tests that you and your doctor decide on. Screening helps find diseases before any symptoms appear.  Eat healthy foods. Choose fruits, vegetables, whole grains, protein, and low-fat dairy foods. Limit fat, especially saturated fat. Reduce salt in your diet.  Limit alcohol. If you are a man, have no more than 2 drinks a day or 14 drinks a week. If you are a woman, have no more than 1 drink a day or 7 drinks a week.  Get at least 30 minutes of physical activity on most days of the week. Walking is a good choice. You also may want to do other activities, such as running, swimming, cycling, or playing tennis or team sports. Discuss any changes in your exercise program with your doctor.  Reach and stay at a healthy weight. This will lower your risk for many problems, such as obesity, diabetes, heart disease, and high blood pressure.  Do not smoke or allow others to smoke around you. If you need help quitting, talk to your doctor about stop-smoking programs and medicines. These can increase your chances of quitting for good.  Care for your mental health. It is easy to get weighed down by worry and stress. Learn strategies to manage stress, like deep breathing and mindfulness, and stay connected with your family and community.  If you find you often feel sad or you have questions about a medical condition or this instruction, always ask your healthcare professional. [address] any warranty or liability for your use of this information. Patient Education        Breast Self-Exam: Care Instructions  Your Care Instructions     A breast self-exam is when you check your breasts for lumps or changes. This regular exam helps you learn how your breasts normally look and feel. Mostbreast problems or changes are not because of cancer. Breast self-exam is not a substitute for a mammogram. Having regular breast exams by your doctor and regular mammograms improve your chances of finding anyproblems with your breasts. Some women set a time each month to do a step-by-step breast self-exam. Other women like a less formal system. They might look at their breasts as they brushtheir teeth, or feel their breasts once in a while in the shower. If you notice a change in your breast, tell your doctor. Follow-up care is a key part of your treatment and safety. Be sure to make and go to all appointments, and call your doctor if you are having problems. It's also a good idea to know your test results and keep alist of the medicines you take. How do you do a breast self-exam?   The best time to examine your breasts is usually one week after your menstrual period begins. Your breasts should not be tender then. If you do not have periods, you might do your exam on a day of the month that is easy to remember.  To examine your breasts:  ? Remove all your clothes above the waist and lie down. When you are lying down, your breast tissue spreads evenly over your chest wall, which makes it easier to feel all your breast tissue. ? Use the pads--not the fingertips--of the 3 middle fingers of your left hand to check your right breast. Move your fingers slowly in small coin-sized circles that overlap. ? Use three levels of pressure to feel of all your breast tissue.  Use

## 2022-06-03 NOTE — PROGRESS NOTES
[facility]  CNM Office Note    Wing Marx is a 51 y.o. female who presents today for her medical conditions/ complaints as noted below. Chief Complaint   Patient presents with    Annual Exam         HPI  Pt presents today for pap smear and breast exam.  She also complains of urine leakage, she keeps panty liner on all times. She can  item and have urine leakage that has worsened in last 6 months. Doesn't feel like she completely empties at times. She also needs refill hydro medication. Noticed more weight gain in middle and some hot flashes but tolerable    Last mammogram:  2021  Last pap smear:  2021  Contraception:  hyst  :  3  Para:  2  AB:  1  Last bone density:  never  Last colonoscopy:   never          Patient Active Problem List   Diagnosis    Hypothyroid    PMS (premenstrual syndrome)    Headache associated with hormonal factors       Patient's last menstrual period was 10/16/2017 (approximate). [id number]    Past Medical History:   Diagnosis Date    Abnormal glandular Papanicolaou smear of cervix     ASCUS     Acid reflux     Fibroid     Hypothyroidism     Irritable bowel syndrome     Thyroid disease     Hypothyroid     Past Surgical History:   Procedure Laterality Date    CHOLECYSTECTOMY      COLONOSCOPY  ,     DILATION AND CURETTAGE OF UTERUS  10.21.2015    ENDOMETRIAL ABLATION  [id number]    HYSTERECTOMY  2017    HYSTERECTOMY, VAGINAL      [facility] retained ovaries    TUBAL LIGATION      UPPER GASTROINTESTINAL ENDOSCOPY       Family History   Problem Relation Age of Onset    Other Father         Carotid blocked 2010    Thyroid Disease Mother     Heart Disease Brother 28        MI    Stroke Paternal Grandmother     Breast Cancer Neg Hx     Colon Cancer Neg Hx     Ovarian Cancer Neg Hx      Social History     Tobacco Use    Smoking status: Never Smoker    Smokeless tobacco: Never Used   Substance Use Topics    Alcohol use:  Yes Comment: occasional       Current Outpatient Medications   Medication Sig Dispense Refill    amitriptyline (ELAVIL) 25 mg tablet       hydroCHLOROthiazide (HYDRODIURIL) 25 MG tablet TAKE 1 TABLET BY MOUTH ONCE DAILY IN THE MORNING 90 tablet 3    ondansetron (ZOFRAN ODT) 4 MG disintegrating tablet Take 1 tablet by mouth every 8 hours as needed for Nausea or Vomiting 30 tablet 0    tiZANidine (ZANAFLEX) 4 MG tablet TAKE (1) TABLET BY MOUTH EVERY SIX HOURS AS NEEDED FOR PAIN. 60 tablet 0    SUMAtriptan (IMITREX) 50 MG tablet Take 1 tablet by mouth once as needed for Migraine 9 tablet 5    Pantoprazole Sodium (PROTONIX PO) Take by mouth      levothyroxine (SYNTHROID) 175 MCG tablet Take 175 mcg by mouth daily.  cetirizine (ZYRTEC) 10 MG tablet Take 10 mg by mouth daily. No current facility-administered medications for this visit. No Known Allergies  Vitals:    06/03/22 0907   BP: 126/87   Pulse: 63     Body mass index is 32.1 kg/m². Review of Systems   Constitutional: Positive for unexpected weight change. HENT: Negative. Eyes: Negative. Respiratory: Negative. Cardiovascular: Negative. Gastrointestinal: Negative. Endocrine: Positive for heat intolerance. Genitourinary: Positive for frequency and urgency. Negative for dyspareunia, dysuria, flank pain, pelvic pain, vaginal bleeding, vaginal discharge and vaginal pain. Musculoskeletal: Negative. Skin: Negative. Allergic/Immunologic: Negative. Neurological: Negative. Hematological: Negative. Psychiatric/Behavioral: Negative. Physical Exam  Vitals and nursing note reviewed. Constitutional:       Appearance: She is well-developed. HENT:      Head: Normocephalic and atraumatic. Eyes:      Conjunctiva/sclera: Conjunctivae normal.      Pupils: Pupils are equal, round, and reactive to light. Cardiovascular:      Rate and Rhythm: Normal rate and regular rhythm. Heart sounds: Normal heart sounds.

## 2022-06-20 ASSESSMENT — ENCOUNTER SYMPTOMS
ALLERGIC/IMMUNOLOGIC NEGATIVE: 1
GASTROINTESTINAL NEGATIVE: 1
RESPIRATORY NEGATIVE: 1
EYES NEGATIVE: 1

## 2022-08-11 DIAGNOSIS — G43.009 MIGRAINE WITHOUT AURA AND WITHOUT STATUS MIGRAINOSUS, NOT INTRACTABLE: ICD-10-CM

## 2022-08-11 NOTE — TELEPHONE ENCOUNTER
Caller: [facility] (NEW ADDRESS) - [address] AT [place]: [address] [facility] [phone] [facility] [phone] FX    Relationship: Pharmacy    Best call back number: [phone] OPT 1    Requested Prescriptions:   Requested Prescriptions     Pending Prescriptions Disp Refills   • Rimegepant Sulfate (NURTEC) 75 MG tablet dispersible tablet 8 tablet 6     Sig: Take 1 tablet by mouth Take As Directed. TAKE ONE TABLET AT ONSET OF MIGRAINE. DO NOT REPEAT FOR 24 HOURS.      Pharmacy where request should be sent: [facility] (NEW ADDRESS) - [address] AT [place]SLY: [address] [facility] [phone] [facility] [phone] F    Additional details provided by patient: [facility] CALLING TO CHECK ON STATUS OF NURTEC REFILL REQUEST AS THEY FAXED REQUEST ON 8/9/22 & AGAIN TODAY, 8/11/22 TO OFFICE FAX [phone].     [facility] STATES PT WILL BE DUE FOR NURTEC REFILL ON 8/26/22.    RX CAN BE SENT EITHER ELECTRONICALLY OR VIA FAX [phone].    Does the patient have less than a 3 day supply:  [] Yes  [x] No    Last office visit with prescribing clinician: 3/25/2022      Next office visit with prescribing clinician: 8/25/2022     PLEASE REVIEW AND ADVISE.    Manju Barnett Rep   08/11/22 14:01 CDT

## 2022-12-07 ENCOUNTER — OFFICE VISIT (OUTPATIENT)
Dept: NEUROLOGY | Facility: CLINIC | Age: 47
End: 2022-12-07

## 2022-12-07 VITALS
HEART RATE: 84 BPM | SYSTOLIC BLOOD PRESSURE: 116 MMHG | WEIGHT: 184 LBS | DIASTOLIC BLOOD PRESSURE: 85 MMHG | HEIGHT: 64 IN | OXYGEN SATURATION: 96 % | BODY MASS INDEX: 31.41 KG/M2

## 2022-12-07 DIAGNOSIS — G44.86 CERVICOGENIC HEADACHE: ICD-10-CM

## 2022-12-07 DIAGNOSIS — G43.009 MIGRAINE WITHOUT AURA AND WITHOUT STATUS MIGRAINOSUS, NOT INTRACTABLE: Primary | ICD-10-CM

## 2022-12-07 PROCEDURE — 99214 OFFICE O/P EST MOD 30 MIN: CPT | Performed by: NURSE PRACTITIONER

## 2022-12-07 RX ORDER — ONDANSETRON 8 MG/1
8 TABLET, ORALLY DISINTEGRATING ORAL AS NEEDED
COMMUNITY
Start: 2022-09-13

## 2022-12-07 NOTE — PROGRESS NOTES
Neurology Progress Note      Chief Complaint:    Headache   Neck Pain    Subjective   History of Present Illness:  Allison Wilkins is a 47 y.o. female who presents today for migraine and neck pain.  She is routinely followed by Rodney George MD for primary care.     Migraine  She reports that she has started a new job in January that she has been doing remarkably well with regard to her migraines and her occipital headaches.  She states that she is having significantly decreased burden of headaches and they are much less severe than they previously had been.  We did attempt to try her on amitriptyline at her last visit but she states that she was nervous to start taking this and never did.  However, she states that she is taking Nurtec for her headaches and reports that it is very successful at aborting her headaches.  She states she is unsure exactly how often she is having a headache at this point but reports she is not even running her Nurtec at this point.  Her last prescription refill of Nurtec was back in October and she continues to have at least 3 pills left at this point.  She reports maybe 1 headache per month.  She continues to follow with chiropractic therapy and reports that her neck pain is also reduced.  She is overall happy at this time.    She has tried and failed both Maxalt and Imitrex in the past.    Allergies:    Patient has no known allergies.    Medications:  Current Outpatient Medications   Medication Sig Dispense Refill   • cetirizine (zyrTEC) 10 MG tablet Take 10 mg by mouth Daily.     • dicyclomine (BENTYL) 10 MG capsule Take 1 capsule by mouth Every 6 (Six) Hours As Needed (prn). 60 capsule 2   • FLUoxetine (PROzac) 20 MG capsule Take 20 mg by mouth Daily.     • hydroCHLOROthiazide (HYDRODIURIL) 25 MG tablet Take 25 mg by mouth Daily.     • levothyroxine (SYNTHROID, LEVOTHROID) 175 MCG tablet Take 175 mcg by mouth Daily.     • nystatin-triamcinolone (MYCOLOG) 320575-8.1  UNIT/GM-% ointment As Needed.     • ondansetron ODT (ZOFRAN-ODT) 8 MG disintegrating tablet Place 8 mg under the tongue As Needed.     • pantoprazole (PROTONIX) 40 MG EC tablet Take 40 mg by mouth Daily.     • Rimegepant Sulfate (NURTEC) 75 MG tablet dispersible tablet Take 1 tablet by mouth Take As Directed. TAKE ONE TABLET AT ONSET OF MIGRAINE. DO NOT REPEAT FOR 24 HOURS. 8 tablet 2   • tiZANidine (ZANAFLEX) 4 MG tablet Take 1 tablet by mouth At Night As Needed for Muscle Spasms. 30 tablet 0   • triamcinolone (KENALOG) 0.1 % ointment      • sucralfate (CARAFATE) 1 g tablet Take 1 g by mouth Daily As Needed.       No current facility-administered medications for this visit.     Current outpatient and discharge medications have been reconciled for the patient.  Reviewed by: DEEPTI Jo    Past Medical History:  Past Medical History:   Diagnosis Date   • Stewart syndrome    • Disease of thyroid gland    • Eosinophilic esophagitis    • GERD (gastroesophageal reflux disease)    • Heavy periods     with pelvic pain   • Hemorrhoids    • Hypertension    • IBS (irritable bowel syndrome)    • PONV (postoperative nausea and vomiting)      Past Surgical History:   Procedure Laterality Date   • CHOLECYSTECTOMY     • DILATION AND CURETTAGE, DIAGNOSTIC / THERAPEUTIC     • ENDOSCOPY N/A 12/29/2016    Procedure: ESOPHAGOGASTRODUODENOSCOPY WITH ANESTHESIA;  Surgeon: Theodore Traylor MD;  Location: [facility] ENDOSCOPY;  Service:    • ENDOSCOPY N/A 11/8/2019    Procedure: ESOPHAGOGASTRODUODENOSCOPY WITH ANESTHESIA;  Surgeon: Theodore Traylor MD;  Location: [facility] ENDOSCOPY;  Service: Gastroenterology   • ENDOSCOPY AND COLONOSCOPY  12/04/2013   • TOTAL LAPAROSCOPIC HYSTERECTOMY N/A 12/21/2017    Procedure: TOTAL LAPAROSCOPIC HYSTERECTOMY WITH DAVINCI SI ROBOT;  Surgeon: Joyce Mcfadden MD;  Location: [facility] OR;  Service:    • TUBAL ABDOMINAL LIGATION       Family History   Problem Relation Age of Onset   •  "Diverticulitis Mother    • Colon polyps Maternal Grandmother    • Colon cancer Neg Hx      Social History     Tobacco Use   • Smoking status: Never   • Smokeless tobacco: Never   Substance Use Topics   • Alcohol use: Yes     Comment: just occassional every 3-4 months   • Drug use: No     Review of Systems   Musculoskeletal: Positive for neck pain.   Neurological: Positive for headache.         Objective   Vital Signs:  Heart Rate:  [84] 84  BP: (116)/(85) 116/85      12/07/22  1128   Weight: 83.5 kg (184 lb)     162.6 cm (64\")  Body mass index is 31.58 kg/m².    Physical Exam  Vitals reviewed.   Constitutional:       Appearance: Normal appearance.   HENT:      Head: Normocephalic.      Mouth/Throat:      Pharynx: Oropharynx is clear.   Eyes:      General: Lids are normal.      Extraocular Movements: Extraocular movements intact.      Pupils: Pupils are equal, round, and reactive to light.   Cardiovascular:      Rate and Rhythm: Normal rate and regular rhythm.      Pulses: Normal pulses.   Pulmonary:      Effort: Pulmonary effort is normal.   Musculoskeletal:      Cervical back: Neck supple. Muscular tenderness present. Decreased range of motion.   Skin:     General: Skin is warm and dry.      Capillary Refill: Capillary refill takes less than 2 seconds.   Neurological:      Motor: Motor strength is normal.      Coordination: Coordination is intact.      Deep Tendon Reflexes: Reflexes are normal and symmetric.   Psychiatric:         Mood and Affect: Mood normal.         Speech: Speech normal.       Neurological Exam  Mental Status  Awake, alert and oriented to person, place and time. Recent and remote memory are intact. Speech is normal. Language is fluent with no aphasia. Attention and concentration are normal.    Cranial Nerves  CN II: Visual acuity is normal. Visual fields full to confrontation.  CN III, IV, VI: Extraocular movements intact bilaterally. Normal lids and orbits bilaterally. Pupils equal round and " reactive to light bilaterally.  CN V: Facial sensation is normal.  CN VII: Full and symmetric facial movement.  CN IX, X: Palate elevates symmetrically. Normal gag reflex.  CN XI: Shoulder shrug strength is normal.  CN XII: Tongue midline without atrophy or fasciculations.    Motor   Strength is 5/5 throughout all four extremities.    Sensory  Sensation is intact to light touch, pinprick, vibration and proprioception in all four extremities.    Reflexes  Deep tendon reflexes are 2+ and symmetric in all four extremities.    Coordination    Finger-to-nose, rapid alternating movements and heel-to-shin normal bilaterally without dysmetria.    Gait  Normal casual, toe, heel and tandem gait.      Results Review:    Lab Results   Component Value Date    GLUCOSE 119 (H) 12/22/2017    BUN 8 12/22/2017    CREATININE 0.70 07/09/2021    EGFRIFNONA 99 12/22/2017    BCR 12.3 12/22/2017    K 3.9 12/22/2017    CO2 26.0 12/22/2017    CALCIUM 9.0 12/22/2017    ALBUMIN 4.9 05/03/2016     (H) 05/03/2016     (H) 05/03/2016     Lab Results   Component Value Date    WBC 5.6 03/24/2022    HGB 12.2 03/24/2022    HCT 37.5 03/24/2022    MCV 92.8 03/24/2022     03/24/2022     No results found for: CHOL, CHLPL, TRIG, HDL, LDL, LDLDIRECT  Lab Results   Component Value Date    TSH 0.211 (L) 04/29/2022     No results found for: HGBA1C  No results found for: FOLATE  No results found for: [id number]       Plan .  Assessment:  Allison Wilkins is a 47 y.o. female who presents with migraine and neck pain.  She is overall doing very well at this point and reports that the significant reduction and stress in physical labor has reduced her neck pain and subsequently her headaches and migraines.  She does continue to report maybe 1-2 migraines per month that are successfully aborted with Nurtec.  She states she will sometimes get an intermittent tension headache which primarily is at the base of her skull and her occiput but reports  that a couple ibuprofen will help with this.  She has decreased the amount of ibuprofen which she is taking.  I do believe that we should continue Nurtec for abortive therapy for her migraines but I do not feel that she needs anything for preventative therapy at this time.  I discussed all my findings with the patient she states understanding.  She does not feel that she needs her Prozac anymore.  However, I have advised that she contact her PCP for tapering the dosage.  She states understanding    Plan:  • Continue Nurtec 70 mg as needed  • Continue with chiropractic therapy.  • Safety discussed  • Call with any questions or concerns.    The patient and I have discussed the plan of care and she is in full agreement at this time.     Follow-Up:  Return in about 6 months (around 6/7/2023) for Migraine.       DEEPTI Jo  12/07/22  11:58 CST

## 2022-12-09 ENCOUNTER — HOSPITAL ENCOUNTER (OUTPATIENT)
Dept: WOMENS IMAGING | Age: 47
Discharge: HOME | End: 2022-12-09
Payer: COMMERCIAL

## 2022-12-09 DIAGNOSIS — Z12.31 ENCOUNTER FOR SCREENING MAMMOGRAM FOR MALIGNANT NEOPLASM OF BREAST: ICD-10-CM

## 2022-12-09 PROCEDURE — 77063 BREAST TOMOSYNTHESIS BI: CPT

## 2023-04-25 ENCOUNTER — TELEPHONE (OUTPATIENT)
Dept: OBGYN CLINIC | Age: 48
End: 2023-04-25

## 2023-06-08 ENCOUNTER — TELEPHONE (OUTPATIENT)
Dept: NEUROLOGY | Facility: CLINIC | Age: 48
End: 2023-06-08
Payer: COMMERCIAL

## 2023-06-08 NOTE — TELEPHONE ENCOUNTER
CALLED PATIENT BACK TO LET HER KNOW THAT WE DID NOT HAVE ANY SOONER APPOINTMENTS. I DID NOTICE SHE HAD AN APPOINTMENT TOMORROW BUT WAS CANCELED AND I EXPLAINED THAT THAT IS ALL WE HAVE SINCE [facility] IS FIXING TO LEAVE FOR PATERNITY LEAVE AND OUR SCHEDULE IS FULL. PATIENT DID NOT ANSWER. LEFT A VOICEMAIL

## 2023-06-08 NOTE — TELEPHONE ENCOUNTER
PATIENT CALLING IN TODAY TO REQUEST AN EARLIER APPOINTMENT.    SCHEDULED FOR 9/25/23 MIGRAINE F/U  PLACED PT ON WAIT LIST.    PLEASE ADVISE PATIENT OF ANY OPENING    THANK YOU

## 2023-09-12 ENCOUNTER — OFFICE VISIT (OUTPATIENT)
Dept: GASTROENTEROLOGY | Facility: CLINIC | Age: 48
End: 2023-09-12
Payer: COMMERCIAL

## 2023-09-12 VITALS
TEMPERATURE: 97.7 F | HEART RATE: 77 BPM | HEIGHT: 64 IN | BODY MASS INDEX: 31.24 KG/M2 | OXYGEN SATURATION: 96 % | DIASTOLIC BLOOD PRESSURE: 82 MMHG | SYSTOLIC BLOOD PRESSURE: 124 MMHG | WEIGHT: 183 LBS

## 2023-09-12 DIAGNOSIS — K21.9 GASTROESOPHAGEAL REFLUX DISEASE, UNSPECIFIED WHETHER ESOPHAGITIS PRESENT: ICD-10-CM

## 2023-09-12 DIAGNOSIS — K58.9 IRRITABLE BOWEL SYNDROME WITHOUT DIARRHEA: ICD-10-CM

## 2023-09-12 DIAGNOSIS — Z12.11 ENCOUNTER FOR SCREENING FOR MALIGNANT NEOPLASM OF COLON: Primary | ICD-10-CM

## 2023-09-12 DIAGNOSIS — Z83.71 FAMILY HX COLONIC POLYPS: ICD-10-CM

## 2023-09-12 RX ORDER — DICYCLOMINE HYDROCHLORIDE 10 MG/1
10 CAPSULE ORAL EVERY 6 HOURS PRN
Qty: 60 CAPSULE | Refills: 2 | Status: SHIPPED | OUTPATIENT
Start: 2023-09-12

## 2023-09-12 RX ORDER — PANTOPRAZOLE SODIUM 40 MG/1
40 TABLET, DELAYED RELEASE ORAL DAILY
Qty: 30 TABLET | Refills: 11 | Status: SHIPPED | OUTPATIENT
Start: 2023-09-12

## 2023-09-12 NOTE — PROGRESS NOTES
[facility] GASTROENTEROLOGY - OFFICE NOTE    9/12/2023    Allison Wilkins   1975    Primary Physician: Rodney George MD    Chief Complaint   Patient presents with    Irritable Bowel Syndrome    Heartburn         HISTORY OF PRESENT ILLNESS:     Allison Wilkins is a 47 y.o. female presents gerd and ibs.            Gerd  Controlled currently. Takes protonix daily with occasional extra dose.  No dysphagia.         IBS  Has irregular bowel habits. Has more of constipation.  Takes bentyl prn. Has intermittent bright red blood per rectum that has been intermittent for 2 months. No weight loss.           UPPER GI ENDOSCOPY (11/08/2019 09:50)       SCANNED - COLONOSCOPY (12/04/2013 00:00) repeat at age 49.         Father had colon polyps.   Cousin had colon cancer.         Past Medical History:   Diagnosis Date    Stewart syndrome     Disease of thyroid gland     Eosinophilic esophagitis     GERD (gastroesophageal reflux disease)     Heavy periods     with pelvic pain    Hemorrhoids     Hypertension     IBS (irritable bowel syndrome)     PONV (postoperative nausea and vomiting)        Past Surgical History:   Procedure Laterality Date    CHOLECYSTECTOMY      DILATION AND CURETTAGE, DIAGNOSTIC / THERAPEUTIC      ENDOSCOPY N/A 12/29/2016    Procedure: ESOPHAGOGASTRODUODENOSCOPY WITH ANESTHESIA;  Surgeon: Theodore Traylor MD;  Location: [facility] ENDOSCOPY;  Service:     ENDOSCOPY N/A 11/8/2019    Procedure: ESOPHAGOGASTRODUODENOSCOPY WITH ANESTHESIA;  Surgeon: Theodore Traylor MD;  Location: [facility] ENDOSCOPY;  Service: Gastroenterology    ENDOSCOPY AND COLONOSCOPY  12/04/2013    TOTAL LAPAROSCOPIC HYSTERECTOMY N/A 12/21/2017    Procedure: TOTAL LAPAROSCOPIC HYSTERECTOMY WITH DAVINCI SI ROBOT;  Surgeon: Joyce Mcfadden MD;  Location: [facility] OR;  Service:     TUBAL ABDOMINAL LIGATION         Outpatient Medications Marked as Taking for the 9/12/23 encounter (Office Visit) with Tashia Bahena APRN    Medication Sig Dispense Refill    cetirizine (zyrTEC) 10 MG tablet Take 1 tablet by mouth Daily.      dicyclomine (BENTYL) 10 MG capsule Take 1 capsule by mouth Every 6 (Six) Hours As Needed (prn). 60 capsule 2    FLUoxetine (PROzac) 20 MG capsule Take 1 capsule by mouth Daily.      hydroCHLOROthiazide (HYDRODIURIL) 25 MG tablet Take 1 tablet by mouth Daily.      levothyroxine (SYNTHROID, LEVOTHROID) 175 MCG tablet Take 1 tablet by mouth Daily.      ondansetron ODT (ZOFRAN-ODT) 8 MG disintegrating tablet Place 1 tablet under the tongue As Needed.      pantoprazole (PROTONIX) 40 MG EC tablet Take 1 tablet by mouth Daily. 30 tablet 11    Rimegepant Sulfate (NURTEC) 75 MG tablet dispersible tablet Take 1 tablet by mouth As Needed (Migraine onset). 8 tablet 11    tiZANidine (ZANAFLEX) 4 MG tablet Take 1 tablet by mouth At Night As Needed for Muscle Spasms. 30 tablet 0    [DISCONTINUED] dicyclomine (BENTYL) 10 MG capsule Take 1 capsule by mouth Every 6 (Six) Hours As Needed (prn). 60 capsule 2    [DISCONTINUED] pantoprazole (PROTONIX) 40 MG EC tablet Take 1 tablet by mouth Daily.         No Known Allergies    Social History     Socioeconomic History    Marital status:    Tobacco Use    Smoking status: Never    Smokeless tobacco: Never   Substance and Sexual Activity    Alcohol use: Yes     Comment: rare    Drug use: No    Sexual activity: Defer       Family History   Problem Relation Age of Onset    Colon polyps Mother     Diverticulitis Mother     Colon polyps Father     Colon polyps Maternal Grandmother     Colon cancer Neg Hx        Review of Systems   Constitutional:  Negative for chills, fever and unexpected weight change.   Respiratory:  Negative for shortness of breath.    Cardiovascular:  Negative for chest pain.   Gastrointestinal:  Negative for abdominal distention, abdominal pain, anal bleeding, blood in stool, diarrhea, nausea and vomiting.      Vitals:    09/12/23 1317   BP: 124/82   Pulse: 77  "  Temp: 97.7 °F (36.5 °C)   SpO2: 96%   Weight: 83 kg (183 lb)   Height: 162.6 cm (64\")      Body mass index is 31.41 kg/m².    Physical Exam  Vitals reviewed.   Constitutional:       General: She is not in acute distress.  Cardiovascular:      Rate and Rhythm: Normal rate and regular rhythm.      Heart sounds: Normal heart sounds.   Pulmonary:      Effort: Pulmonary effort is normal.      Breath sounds: Normal breath sounds.   Abdominal:      General: Bowel sounds are normal. There is no distension.      Palpations: Abdomen is soft.      Tenderness: There is no abdominal tenderness.   Skin:     General: Skin is warm and dry.   Neurological:      Mental Status: She is alert.       Results for orders placed or performed during the hospital encounter of 07/09/21   POC Creatinine    Specimen: Blood   Result Value Ref Range    Creatinine 0.70 0.60 - 1.30 mg/dL           ASSESSMENT AND PLAN    Assessment & Plan     Diagnoses and all orders for this visit:    1. Encounter for screening for malignant neoplasm of colon (Primary)  -     Case Request; Standing  -     Case Request    2. Family hx colonic polyps    3. Gastroesophageal reflux disease, unspecified whether esophagitis present    4. Irritable bowel syndrome without diarrhea    Other orders  -     Implement Anesthesia Orders Day of Procedure; Standing  -     Obtain Informed Consent; Standing  -     pantoprazole (PROTONIX) 40 MG EC tablet; Take 1 tablet by mouth Daily.  Dispense: 30 tablet; Refill: 11  -     dicyclomine (BENTYL) 10 MG capsule; Take 1 capsule by mouth Every 6 (Six) Hours As Needed (prn).  Dispense: 60 capsule; Refill: 2      Schedule colonoscopy.       In regards to gerd, this is stable. I will refill protonix. Strict anti reflux precautions.       In regards to IBS,   this is stable. I will refill bentyl.       Office visit 1 years.     COLONOSCOPY WITH ANESTHESIA (N/A)  All risks, benefits, alternatives, and indications of colonoscopy procedure have " been discussed with the patient. Risks to include perforation of the colon requiring possible surgery or colostomy, risk of bleeding from biopsies or removal of colon tissue, possibility of missing a colon polyp or cancer, or adverse drug reaction.  Benefits to include the diagnosis and management of disease of the colon and rectum. Alternatives to include barium enema, radiographic evaluation, lab testing or no intervention. Pt verbalizes understanding and agrees.            Return in about 1 year (around 9/12/2024).          There are no Patient Instructions on file for this visit.      Tashia Bahena, APRN

## 2023-09-25 ENCOUNTER — OFFICE VISIT (OUTPATIENT)
Dept: NEUROLOGY | Facility: CLINIC | Age: 48
End: 2023-09-25

## 2023-09-25 VITALS
HEIGHT: 64 IN | SYSTOLIC BLOOD PRESSURE: 120 MMHG | WEIGHT: 179 LBS | DIASTOLIC BLOOD PRESSURE: 80 MMHG | OXYGEN SATURATION: 93 % | BODY MASS INDEX: 30.56 KG/M2 | HEART RATE: 85 BPM

## 2023-09-25 DIAGNOSIS — G44.86 CERVICOGENIC HEADACHE: ICD-10-CM

## 2023-09-25 DIAGNOSIS — M79.18 CERVICAL MYOFASCIAL PAIN SYNDROME: ICD-10-CM

## 2023-09-25 DIAGNOSIS — G43.009 MIGRAINE WITHOUT AURA AND WITHOUT STATUS MIGRAINOSUS, NOT INTRACTABLE: Primary | ICD-10-CM

## 2023-09-25 DIAGNOSIS — M54.12 CERVICAL RADICULOPATHY: ICD-10-CM

## 2023-09-25 PROCEDURE — 99214 OFFICE O/P EST MOD 30 MIN: CPT | Performed by: NURSE PRACTITIONER

## 2023-09-25 NOTE — PROGRESS NOTES
Neurology Progress Note      Chief Complaint:    Headache   Neck Pain    Subjective   History of Present Illness:  Allison Wilkins is a 48 y.o. female who presents today for migraine and neck pain.  She is routinely followed by Rodney George MD for primary care.     Migraine  She continues to report migraines most recently at about 3-4 per month.  Her headache days continue to be upward of 15-20 per month.  She has no changes in the character or quality of her migraine. She continues to have severe throbbing and aching pains at the base of her skull.  She has continued working as a dental assistant and does continue to have odd neck positions.  She reports worsening of neck pain recently.  She continues to have decreased ROM.  Her reflexes remain hyperreflexic.  She continues to report that Nurtec helps her headaches but she notes that sometimes it will not completely abort her migraine.  She continues to take Ibuprofen a few times per week.     She has tried and failed both Maxalt and Imitrex in the past.    Allergies:    Patient has no known allergies.    Medications:  Current Outpatient Medications   Medication Sig Dispense Refill    cetirizine (zyrTEC) 10 MG tablet Take 1 tablet by mouth Daily.      dicyclomine (BENTYL) 10 MG capsule Take 1 capsule by mouth Every 6 (Six) Hours As Needed (prn). 60 capsule 2    FLUoxetine (PROzac) 20 MG capsule Take 1 capsule by mouth Daily.      hydroCHLOROthiazide (HYDRODIURIL) 25 MG tablet Take 1 tablet by mouth Daily.      levothyroxine (SYNTHROID, LEVOTHROID) 175 MCG tablet Take 1 tablet by mouth Daily.      nystatin-triamcinolone (MYCOLOG) 787555-0.1 UNIT/GM-% ointment As Needed.      ondansetron ODT (ZOFRAN-ODT) 8 MG disintegrating tablet Place 1 tablet under the tongue As Needed.      pantoprazole (PROTONIX) 40 MG EC tablet Take 1 tablet by mouth Daily. 30 tablet 11    Rimegepant Sulfate (NURTEC) 75 MG tablet dispersible tablet Take 1 tablet by mouth As Needed  (Migraine onset). 8 tablet 11    sucralfate (CARAFATE) 1 g tablet Take 1 tablet by mouth Daily As Needed.      tiZANidine (ZANAFLEX) 4 MG tablet Take 1 tablet by mouth At Night As Needed for Muscle Spasms. 30 tablet 0    triamcinolone (KENALOG) 0.1 % ointment        No current facility-administered medications for this visit.     Current outpatient and discharge medications have been reconciled for the patient.  Reviewed by: DEEPTI Jo    Past Medical History:  Past Medical History:   Diagnosis Date    Stewart syndrome     Disease of thyroid gland     Eosinophilic esophagitis     GERD (gastroesophageal reflux disease)     Heavy periods     with pelvic pain    Hemorrhoids     Hypertension     IBS (irritable bowel syndrome)     PONV (postoperative nausea and vomiting)      Past Surgical History:   Procedure Laterality Date    CHOLECYSTECTOMY      DILATION AND CURETTAGE, DIAGNOSTIC / THERAPEUTIC      ENDOSCOPY N/A 12/29/2016    Procedure: ESOPHAGOGASTRODUODENOSCOPY WITH ANESTHESIA;  Surgeon: Theodore Traylor MD;  Location: [facility] ENDOSCOPY;  Service:     ENDOSCOPY N/A 11/8/2019    Procedure: ESOPHAGOGASTRODUODENOSCOPY WITH ANESTHESIA;  Surgeon: Theodore Traylor MD;  Location: [facility] ENDOSCOPY;  Service: Gastroenterology    ENDOSCOPY AND COLONOSCOPY  12/04/2013    TOTAL LAPAROSCOPIC HYSTERECTOMY N/A 12/21/2017    Procedure: TOTAL LAPAROSCOPIC HYSTERECTOMY WITH DAVINCI SI ROBOT;  Surgeon: Joyce Mcfadden MD;  Location: [facility] OR;  Service:     TUBAL ABDOMINAL LIGATION       Family History   Problem Relation Age of Onset    Colon polyps Mother     Diverticulitis Mother     Colon polyps Father     Colon polyps Maternal Grandmother     Colon cancer Neg Hx      Social History     Tobacco Use    Smoking status: Never    Smokeless tobacco: Never   Substance Use Topics    Alcohol use: Yes     Comment: rare    Drug use: No     Review of Systems   Musculoskeletal:  Positive for neck pain.   Neurological:   "Positive for headache.       Objective   Vital Signs:  Heart Rate:  [85] 85  BP: (120)/(80) 120/80      09/25/23  1347   Weight: 81.2 kg (179 lb)     162.6 cm (64\")  Body mass index is 30.73 kg/m².    Physical Exam  Vitals reviewed.   Constitutional:       Appearance: Normal appearance.   HENT:      Head: Normocephalic.      Mouth/Throat:      Pharynx: Oropharynx is clear.   Eyes:      General: Lids are normal.      Extraocular Movements: Extraocular movements intact.      Pupils: Pupils are equal, round, and reactive to light.   Cardiovascular:      Rate and Rhythm: Normal rate and regular rhythm.      Pulses: Normal pulses.   Pulmonary:      Effort: Pulmonary effort is normal.   Musculoskeletal:      Cervical back: Neck supple. Muscular tenderness present. Decreased range of motion.   Skin:     General: Skin is warm and dry.      Capillary Refill: Capillary refill takes less than 2 seconds.   Neurological:      Motor: Motor strength is normal.      Coordination: Coordination is intact.      Deep Tendon Reflexes: Reflexes are normal and symmetric.   Psychiatric:         Mood and Affect: Mood normal.         Speech: Speech normal.     Neurological Exam  Mental Status  Awake, alert and oriented to person, place and time. Recent and remote memory are intact. Speech is normal. Language is fluent with no aphasia. Attention and concentration are normal.    Cranial Nerves  CN II: Visual acuity is normal. Visual fields full to confrontation.  CN III, IV, VI: Extraocular movements intact bilaterally. Normal lids and orbits bilaterally. Pupils equal round and reactive to light bilaterally.  CN V: Facial sensation is normal.  CN VII: Full and symmetric facial movement.  CN IX, X: Palate elevates symmetrically. Normal gag reflex.  CN XI: Shoulder shrug strength is normal.  CN XII: Tongue midline without atrophy or fasciculations.    Motor   Strength is 5/5 throughout all four extremities.    Sensory  Sensation is intact to " light touch, pinprick, vibration and proprioception in all four extremities.    Reflexes  Deep tendon reflexes are 2+ and symmetric in all four extremities.    Coordination    Finger-to-nose, rapid alternating movements and heel-to-shin normal bilaterally without dysmetria.    Gait  Normal casual, toe, heel and tandem gait.  Results Review:    Lab Results   Component Value Date    GLUCOSE 119 (H) 12/22/2017    BUN 8 12/22/2017    CREATININE 0.70 07/09/2021    EGFRIFNONA 99 12/22/2017    BCR 12.3 12/22/2017    K 3.9 12/22/2017    CO2 26.0 12/22/2017    CALCIUM 9.0 12/22/2017    ALBUMIN 4.9 05/03/2016     (H) 05/03/2016     (H) 05/03/2016     Lab Results   Component Value Date    WBC 5.6 03/24/2022    HGB 12.2 03/24/2022    HCT 37.5 03/24/2022    MCV 92.8 03/24/2022     03/24/2022     No results found for: CHOL, CHLPL, TRIG, HDL, LDL, LDLDIRECT  Lab Results   Component Value Date    TSH 1.058 05/01/2023     No results found for: HGBA1C  No results found for: FOLATE  Lab Results   Component Value Date    [id number] 275 05/01/2023          Plan .  Assessment:  Allison Wilkins is a 48 y.o. female who presents with migraine and neck pain.  She continues to report headaches and notes that the frequency has increased.  She has no new symptoms but worsening with regard to her migraine/headache frequency.  She does continue to have stress on her neck specifically due to her job and her neck being in odd positions. She notes the Nurtec does help lessen her migraines but she continues to have pain after nurtec.  She continues with muscle relaxers and Ibuprofen which I have discussed should be used sparingly.  She is understanding.  At this time we will get her back into PT for evaluation and we will get updated MRI.  She remains hyperreflexic and I feel this is somewhat worse than it has been.  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 We will try Botox for preventative therapy at this time.   Botox was explained to the patient.  She is understanding.    Plan:  Continue Nurtec 75 mg as needed  Botox referral to Dr. Mujica  MRI Cervical Spine  PT  OK to continue with chiropractic therapy.  Safety discussed  Call with any questions or concerns.    The patient and I have discussed the plan of care and she is in full agreement at this time.     Follow-Up:  Return in about 2 months (around 11/25/2023).       Nico Mayorga, DEEPTI  09/25/23  14:42 CDT

## 2023-10-01 ENCOUNTER — NURSE TRIAGE (OUTPATIENT)
Dept: CALL CENTER | Facility: HOSPITAL | Age: 48
End: 2023-10-01
Payer: COMMERCIAL

## 2023-10-01 NOTE — TELEPHONE ENCOUNTER
Reason for Disposition  • Caller has already spoken with another triager and has no further questions.    Additional Information  • Negative: Caller is angry or rude (e.g., hangs up, verbally abusive, yelling)  • Negative: Caller hangs up  • Negative: Caller has already spoken with the PCP and has no further questions.    Answer Assessment - Initial Assessment Questions  N/A  Patient is on vacation and left her prescription at home. Was calling to have prescription called in for her. Already with spoke with pharmacy and got it taken care of.    Protocols used: No Contact or Duplicate Contact Call[facility]

## 2023-10-13 RX ORDER — LORAZEPAM 1 MG/1
TABLET ORAL
Qty: 1 TABLET | Refills: 0 | Status: SHIPPED | OUTPATIENT
Start: 2023-10-13

## 2023-10-27 ENCOUNTER — TREATMENT (OUTPATIENT)
Dept: PHYSICAL THERAPY | Facility: CLINIC | Age: 48
End: 2023-10-27
Payer: COMMERCIAL

## 2023-10-27 DIAGNOSIS — M54.12 RADICULOPATHY, CERVICAL: ICD-10-CM

## 2023-10-27 DIAGNOSIS — G44.209 TENSION HEADACHE: ICD-10-CM

## 2023-10-27 DIAGNOSIS — M54.2 CERVICAL PAIN: Primary | ICD-10-CM

## 2023-10-27 NOTE — PROGRESS NOTES
Physical Therapy Initial Evaluation and Plan of Care  [address] [place] [address]    Patient: Allison Wilkins               : 1975  Visit Date: 10/27/2023  Referring practitioner: Self Referring  Date of Initial Visit: 10/27/2023  Patient seen for 1 sessions    Visit Diagnoses:    ICD-10-CM ICD-9-CM   1. Cervical pain  M54.2 723.1   2. Tension headache  G44.209 307.81   3. Radiculopathy, cervical  M54.12 723.4     Past Medical History:   Diagnosis Date    Stewart syndrome     Disease of thyroid gland     Eosinophilic esophagitis     GERD (gastroesophageal reflux disease)     Heavy periods     with pelvic pain    Hemorrhoids     Hypertension     IBS (irritable bowel syndrome)     PONV (postoperative nausea and vomiting)      Past Surgical History:   Procedure Laterality Date    CHOLECYSTECTOMY      DILATION AND CURETTAGE, DIAGNOSTIC / THERAPEUTIC      ENDOSCOPY N/A 2016    Procedure: ESOPHAGOGASTRODUODENOSCOPY WITH ANESTHESIA;  Surgeon: Theodore Traylor MD;  Location: [facility] ENDOSCOPY;  Service:     ENDOSCOPY N/A 2019    Procedure: ESOPHAGOGASTRODUODENOSCOPY WITH ANESTHESIA;  Surgeon: Theodore Traylor MD;  Location: [facility] ENDOSCOPY;  Service: Gastroenterology    ENDOSCOPY AND COLONOSCOPY  2013    TOTAL LAPAROSCOPIC HYSTERECTOMY N/A 2017    Procedure: TOTAL LAPAROSCOPIC HYSTERECTOMY WITH DAVINCI SI ROBOT;  Surgeon: Joyce Mcfadden MD;  Location: [facility] OR;  Service:     TUBAL ABDOMINAL LIGATION           SUBJECTIVE     Subjective Evaluation    History of Present Illness  Date of onset: 3/27/2023  Mechanism of injury: She had been seen in  in  for neck pain. She had worked as a dental assistant. Her pain got better when she was working more  but she went back to dental assisting and her neck and headache pain is worse now. She denies any radicular symptoms but was told her DTR are diminished on  "left. She couldn't tell if the DN worked the last time because there was other stress along with it. She sits on the patient's left side.       Patient Occupation: dental assistant Pain  Current pain ratin  At worst pain ratin (worse first thing in AM)  Location: neck pain/headaches  Quality: dull ache and sharp (Denies N/T)  Relieving factors: heat and medications (TENS)  Exacerbated by: Rotating neck     Social Support  Lives in: one-story house  Lives with: spouse, adult children and young children    Hand dominance: right    Diagnostic Tests  Abnormal MRI: having an MRI next week.    Treatments  Previous treatment: physical therapy  Current treatment: chiropractic  Patient Goals  Patient goals for therapy: decreased pain and independence with ADLs/IADLs              OBJECTIVE     Objective          Palpation   Left   Hypertonic in the upper trapezius.   Tenderness of the upper trapezius.     Right   Hypertonic in the suboccipitals and upper trapezius. Tenderness of the suboccipitals and upper trapezius.     Tenderness   Cervical Spine   Tenderness in the facet joint.     Active Range of Motion   Cervical/Thoracic Spine   Cervical    Left rotation: 57 degrees with pain  Right rotation: 55 degrees with pain    Strength/Myotome Testing     Left Shoulder     Planes of Motion   Flexion: 5     Right Shoulder     Planes of Motion   Flexion: 5     Left Elbow   Flexion: 5  Extension: 4    Right Elbow   Flexion: 5  Extension: 5    Left Wrist/Hand   Wrist extension: 5  Wrist flexion: 4    Right Wrist/Hand   Wrist extension: 5  Wrist flexion: 5      Dry Needle Location [ (1-2 muscles)/ (3 or more muscles)]   Location Depth (\") Comment   Zeus C2-6 post cut 2    Zesu gr occip 1    Zeus subocc 0.5 UT/SC   Zeus accessory 2 LTR noted   Left gr occip in scalp Supf 0.5                   Time 20     Manual Therapy     [id number]  Comments   Prone C1 left PA mob Gr 3 sustained                   Timed Minutes 10      "   Female  strength (pounds)  AGE Right Hand RH Norms Left Hand LH Norms   45-49 40* 62+15.1 40 56+12.7   (MOMO Waddell et al; Hand Dynometer: Effects of trials and sessions.  Perpetual and Motor Skills 61:195-8, 1985)  Key  * = Dominant hand  > = Intervention   Therapy Education/Self Care 12548   Education offered today HEP  Educated on effects of DN. Advised to hold on ice if she can.    [employer] Code    Ongoing HEP   Stop during work and scap/cerv retraction and left rotate upper neck   Timed Minutes        Total Timed Treatment:     10   mins  Total Time of Visit:            50   mins    ASSESSMENT/PLAN     GOALS:  Goals                                          Progress Note due by 11/26/23                                                      Recert due by 1/24/24   LTG by: 6 weeks Comments Date Status   Symmetrical alexandria C1 body      Reports no headaches except minor twinges relieved with change of position and exercises      Improve alexandria cerv rotation to 65 deg with no pain      Ind with HEP                     Assessment & Plan       Assessment  Impairments: abnormal muscle firing, abnormal muscle tone, abnormal or restricted ROM, activity intolerance, impaired physical strength, lacks appropriate home exercise program and pain with function   Functional limitations: uncomfortable because of pain, sitting and unable to perform repetitive tasks   Assessment details: Her symptoms are consistent with a strain from the sustained forward head/rotated position at her job. Her upper cervical is stiff and rotated which could exacerbated the headaches. We did a trial of dry needling today and she felt looser.   This patient would benefit from skilled PT.   Prognosis: good    Plan  Therapy options: will be seen for skilled therapy services  Planned modality interventions: dry needling, low level laser therapy, TENS and traction  Planned therapy interventions: manual therapy, neuromuscular re-education, spinal/joint  mobilization, home exercise program, body mechanics training, stretching, therapeutic activities, strengthening, soft tissue mobilization and postural training  Frequency: 2x week  Duration in weeks: 6  Treatment plan discussed with: patient  Plan details: Focus early on pain relief with soft tissue work and other modalities, including potentially dry needling. Manual therapy used for mobilizations of the spine and upper thoracic and flexibility through the upper girdle. Progress with stability for posture and the shoulder girdle and progress HEP for the same.         SIGNATURE: Vasu James PT, [place] License #: [id number]  Electronically Signed on 10/27/2023      Initial Certification  Certification Period: 10/27/2023 through 1/24/2024  I certify that the therapy services are furnished while this patient is under my care.  The services outlined above are required by this patient, and will be reviewed every 90 days.     PHYSICIAN: Referring, Self (NPI: [id number])    Signature____________________________________________DATE: _________     Please sign and return via fax to [phone].   Thank you so much for letting us work with Allison. I appreciate your letting us work with your patients. If you have any questions or concerns, please don't hesitate to contact me.          [address] [address]  [phone]

## 2023-11-03 ENCOUNTER — HOSPITAL ENCOUNTER (OUTPATIENT)
Dept: MRI IMAGING | Facility: HOSPITAL | Age: 48
Discharge: HOME | End: 2023-11-03
Admitting: NURSE PRACTITIONER
Payer: COMMERCIAL

## 2023-11-03 DIAGNOSIS — M54.12 CERVICAL RADICULOPATHY: ICD-10-CM

## 2023-11-03 DIAGNOSIS — M79.18 CERVICAL MYOFASCIAL PAIN SYNDROME: ICD-10-CM

## 2023-11-03 DIAGNOSIS — G43.009 MIGRAINE WITHOUT AURA AND WITHOUT STATUS MIGRAINOSUS, NOT INTRACTABLE: ICD-10-CM

## 2023-11-03 DIAGNOSIS — G44.86 CERVICOGENIC HEADACHE: ICD-10-CM

## 2023-11-03 PROCEDURE — 72141 MRI NECK SPINE W/O DYE: CPT

## 2023-11-06 ENCOUNTER — TELEPHONE (OUTPATIENT)
Dept: NEUROLOGY | Facility: CLINIC | Age: 48
End: 2023-11-06
Payer: COMMERCIAL

## 2023-11-06 NOTE — TELEPHONE ENCOUNTER
----- Message from DEEPTI Jo sent at 11/3/2023 10:11 AM CDT -----  MRI continues to show her degenerative changes without any significant interval change.  Please let her know.

## 2023-11-06 NOTE — TELEPHONE ENCOUNTER
CALLED PATIENT TO LET HER KNOW THE MRI CERVICAL SPINE RESULTS. LET HER KNOW THAT CHRISTY STATED MRI continues to show her degenerative changes without any significant interval change. LEFT DETAILED VOICEMAIL AND LEFT MY NUMBER FOR HER TO RETURN MY CALL WITH ANY QUESTIONS.

## 2023-11-10 ENCOUNTER — TREATMENT (OUTPATIENT)
Dept: PHYSICAL THERAPY | Facility: CLINIC | Age: 48
End: 2023-11-10
Payer: COMMERCIAL

## 2023-11-10 DIAGNOSIS — G44.209 TENSION HEADACHE: ICD-10-CM

## 2023-11-10 DIAGNOSIS — M54.12 RADICULOPATHY, CERVICAL: ICD-10-CM

## 2023-11-10 DIAGNOSIS — M54.2 CERVICAL PAIN: Primary | ICD-10-CM

## 2023-11-10 NOTE — PROGRESS NOTES
Physical Therapy Treatment Note  [address] [place] [address]    Patient: Allison Wilkins                                                 Visit Date: 11/10/2023  :     1975    Referring practitioner:    DEEPTI Jo  Date of Initial Visit:          Type: THERAPY  Noted: 10/27/2023    Patient seen for 2 sessions    Visit Diagnoses:    ICD-10-CM ICD-9-CM   1. Cervical pain  M54.2 723.1   2. Tension headache  G44.209 307.81   3. Radiculopathy, cervical  M54.12 723.4     SUBJECTIVE     Subjective: The base of her neck has been bothering her. She applies ice to sleep at night. She will occasionally perform cervical retraction at work. She goes to the chiropractor every two weeks and it helps. She is trying botox in her neck on . She was very sore after the dry needling last session       PAIN: 2/10     OBJECTIVE     Objective     Therapeutic Exercises    86780 Units Comments   Issued formal HEP and reviewed with patient                Timed Minutes 10      Manual Therapy     88829  Comments   STM w/ massage cream to B UT/LS and suboccipitals     IASTM w/ blue ridged roller to thoracic paraspinals     Prone thoracic ext mobs  Tender over CT   Cervical manual distraction w/ bilateral side bends        Timed Minutes 30     Therapy Education/Self Care 93225   Education offered today HEP  Educated on effects of DN. Advised to hold on ice if she can.    [employer] Code  [email or web address]   Ongoing HEP   Access Code: [email or web address]  URL: [email or web address]  Date: 11/10/2023  Prepared by: Nancy Goldstein    Exercises  - Seated UE phasic  - 3-4 x weekly - 2 sets - 10 reps  - Seated Cervical Retraction  - 3-4 x weekly - 2 sets - 10 reps  - Seated Levator Scapulae Stretch  - 3-4 x weekly - 3 reps - 30 sec hold  - Seated Upper Trapezius Stretch  - 3-4 x weekly - 3 reps - 30 sec hold   Timed Minutes         Total Timed Treatment:     40    mins  Total Time of Visit:             40   mins         ASSESSMENT/PLAN     GOALS  Goals                                          Progress Note due by 11/26/23                                                      Recert due by 1/24/24   LTG by: 6 weeks Comments Date Status   Symmetrical alexandria C1 body         Reports no headaches except minor twinges relieved with change of position and exercises         Improve alexandria cerv rotation to 65 deg with no pain         Ind with HEP             Assessment/Plan     ASSESSMENT:   Today was Allison's first visit since her initial evaluation, therefore no goals have been met at this time. We focused primarily on decreasing soft tissue restrictions and improving thoracic and cervical mobility. Moderate guarding was found in B suboccipitals and mid trap region and her thoracic spine was very hypomobile. She was also assigned formal HEP which she demonstrated understanding.    PLAN: Assess response to HEP and today's intervention and prioritize CT mobility as well as postural strengthening.     SIGNATURE: Nancy Goldstein PTA, [place] License #: [id number]  Electronically Signed on 11/10/2023        [address] [place] [address] [place] [address]  [phone]

## 2023-11-13 RX ORDER — PANTOPRAZOLE SODIUM 40 MG/1
40 TABLET, DELAYED RELEASE ORAL 2 TIMES DAILY
Qty: 60 TABLET | Refills: 11 | Status: SHIPPED | OUTPATIENT
Start: 2023-11-13

## 2023-11-17 ENCOUNTER — OFFICE VISIT (OUTPATIENT)
Dept: NEUROLOGY | Facility: CLINIC | Age: 48
End: 2023-11-17
Payer: COMMERCIAL

## 2023-11-17 VITALS
WEIGHT: 179 LBS | BODY MASS INDEX: 30.56 KG/M2 | SYSTOLIC BLOOD PRESSURE: 132 MMHG | DIASTOLIC BLOOD PRESSURE: 70 MMHG | OXYGEN SATURATION: 98 % | HEART RATE: 74 BPM | HEIGHT: 64 IN

## 2023-11-17 DIAGNOSIS — M79.18 CERVICAL MYOFASCIAL PAIN SYNDROME: Primary | ICD-10-CM

## 2023-11-17 PROCEDURE — 99214 OFFICE O/P EST MOD 30 MIN: CPT | Performed by: NURSE PRACTITIONER

## 2023-11-17 RX ORDER — METHYLPREDNISOLONE 4 MG/1
TABLET ORAL
Qty: 21 TABLET | Refills: 0 | Status: SHIPPED | OUTPATIENT
Start: 2023-11-17

## 2023-11-17 NOTE — PROGRESS NOTES
Neurology Progress Note      Chief Complaint:    Headache   Neck Pain    Subjective   History of Present Illness:  Allison Wilkins is a 48 y.o. female who presents today for migraine and neck pain.  She is routinely followed by Rodney George MD for primary care.     Migraine  She continues to report frequent migraines with total headache days of around 15-20 per month.  She continues Nurtec as needed but notes that she will frequently need to add Ibuprofen to this to help.  She continues with neck pain.  She has been going to PT and notes that this is helping minimally.  Botox is scheduled for 12/1.  She remains hesitant to other preventatives secondary to potential side effects.    Preventative Medications Tried:  Amitriptyline    Abortive Medications Tried:  Imitrex and Maxalt    Allergies:    Patient has no known allergies.    Medications:  Current Outpatient Medications   Medication Sig Dispense Refill    cetirizine (zyrTEC) 10 MG tablet Take 1 tablet by mouth Daily.      dicyclomine (BENTYL) 10 MG capsule Take 1 capsule by mouth Every 6 (Six) Hours As Needed (prn). 60 capsule 2    FLUoxetine (PROzac) 20 MG capsule Take 1 capsule by mouth Daily.      hydroCHLOROthiazide (HYDRODIURIL) 25 MG tablet Take 1 tablet by mouth Daily.      levothyroxine (SYNTHROID, LEVOTHROID) 175 MCG tablet Take 1 tablet by mouth Daily.      LORazepam (ATIVAN) 1 MG tablet Take 1 tablet 45 minutes prior to MRI. 1 tablet 0    nystatin-triamcinolone (MYCOLOG) 654747-1.1 UNIT/GM-% ointment As Needed.      ondansetron ODT (ZOFRAN-ODT) 8 MG disintegrating tablet Place 1 tablet under the tongue As Needed.      pantoprazole (PROTONIX) 40 MG EC tablet Take 1 tablet by mouth 2 (Two) Times a Day. 60 tablet 11    Rimegepant Sulfate (NURTEC) 75 MG tablet dispersible tablet Take 1 tablet by mouth As Needed (Migraine onset). 8 tablet 11    sucralfate (CARAFATE) 1 g tablet Take 1 tablet by mouth Daily As Needed.      tiZANidine (ZANAFLEX) 4  MG tablet Take 1 tablet by mouth At Night As Needed for Muscle Spasms. 30 tablet 0    triamcinolone (KENALOG) 0.1 % ointment        No current facility-administered medications for this visit.     Current outpatient and discharge medications have been reconciled for the patient.  Reviewed by: DEEPTI Jo    Past Medical History:  Past Medical History:   Diagnosis Date    Stewart syndrome     Disease of thyroid gland     Eosinophilic esophagitis     GERD (gastroesophageal reflux disease)     Heavy periods     with pelvic pain    Hemorrhoids     Hypertension     IBS (irritable bowel syndrome)     PONV (postoperative nausea and vomiting)      Past Surgical History:   Procedure Laterality Date    CHOLECYSTECTOMY      DILATION AND CURETTAGE, DIAGNOSTIC / THERAPEUTIC      ENDOSCOPY N/A 12/29/2016    Procedure: ESOPHAGOGASTRODUODENOSCOPY WITH ANESTHESIA;  Surgeon: Theodore Traylor MD;  Location: [facility] ENDOSCOPY;  Service:     ENDOSCOPY N/A 11/8/2019    Procedure: ESOPHAGOGASTRODUODENOSCOPY WITH ANESTHESIA;  Surgeon: Theodore Traylor MD;  Location: [facility] ENDOSCOPY;  Service: Gastroenterology    ENDOSCOPY AND COLONOSCOPY  12/04/2013    TOTAL LAPAROSCOPIC HYSTERECTOMY N/A 12/21/2017    Procedure: TOTAL LAPAROSCOPIC HYSTERECTOMY WITH DAVINCI SI ROBOT;  Surgeon: Joyce Mcfadden MD;  Location: [facility] OR;  Service:     TUBAL ABDOMINAL LIGATION       Family History   Problem Relation Age of Onset    Colon polyps Mother     Diverticulitis Mother     Colon polyps Father     Colon polyps Maternal Grandmother     Colon cancer Neg Hx      Social History     Tobacco Use    Smoking status: Never    Smokeless tobacco: Never   Substance Use Topics    Alcohol use: Yes     Comment: rare    Drug use: No     Review of Systems   Musculoskeletal:  Positive for neck pain.   Neurological:  Positive for headache.         Objective   Vital Signs:     There were no vitals filed for this visit.    No Height Documented This  Encounter  There is no height or weight on file to calculate BMI.    Physical Exam  Vitals reviewed.   Constitutional:       Appearance: Normal appearance.   HENT:      Head: Normocephalic.      Mouth/Throat:      Pharynx: Oropharynx is clear.   Eyes:      General: Lids are normal.      Extraocular Movements: Extraocular movements intact.      Pupils: Pupils are equal, round, and reactive to light.   Cardiovascular:      Rate and Rhythm: Normal rate and regular rhythm.      Pulses: Normal pulses.   Pulmonary:      Effort: Pulmonary effort is normal.   Musculoskeletal:      Cervical back: Neck supple. Muscular tenderness present. Decreased range of motion.   Skin:     General: Skin is warm and dry.      Capillary Refill: Capillary refill takes less than 2 seconds.   Neurological:      Motor: Motor strength is normal.     Coordination: Coordination is intact.      Deep Tendon Reflexes: Reflexes are normal and symmetric.   Psychiatric:         Mood and Affect: Mood normal.         Speech: Speech normal.     Neurological Exam  Mental Status  Awake, alert and oriented to person, place and time. Recent and remote memory are intact. Speech is normal. Language is fluent with no aphasia. Attention and concentration are normal.    Cranial Nerves  CN II: Visual acuity is normal. Visual fields full to confrontation.  CN III, IV, VI: Extraocular movements intact bilaterally. Normal lids and orbits bilaterally. Pupils equal round and reactive to light bilaterally.  CN V: Facial sensation is normal.  CN VII: Full and symmetric facial movement.  CN IX, X: Palate elevates symmetrically. Normal gag reflex.  CN XI: Shoulder shrug strength is normal.  CN XII: Tongue midline without atrophy or fasciculations.    Motor   Strength is 5/5 throughout all four extremities.    Sensory  Sensation is intact to light touch, pinprick, vibration and proprioception in all four extremities.    Reflexes  Deep tendon reflexes are 2+ and symmetric in  "all four extremities.    Coordination    Finger-to-nose, rapid alternating movements and heel-to-shin normal bilaterally without dysmetria.    Gait  Normal casual, toe, heel and tandem gait.    Results Review:    Lab Results   Component Value Date    GLUCOSE 119 (H) 12/22/2017    BUN 8 12/22/2017    CREATININE 0.70 07/09/2021    EGFRIFNONA 99 12/22/2017    BCR 12.3 12/22/2017    K 3.9 12/22/2017    CO2 26.0 12/22/2017    CALCIUM 9.0 12/22/2017    ALBUMIN 4.9 05/03/2016     (H) 05/03/2016     (H) 05/03/2016     Lab Results   Component Value Date    WBC 5.6 03/24/2022    HGB 12.2 03/24/2022    HCT 37.5 03/24/2022    MCV 92.8 03/24/2022     03/24/2022     No results found for: \"CHOL\", \"CHLPL\", \"TRIG\", \"HDL\", \"LDL\", \"LDLDIRECT\"  Lab Results   Component Value Date    TSH 1.058 05/01/2023     No results found for: \"HGBA1C\"  No results found for: \"FOLATE\"  Lab Results   Component Value Date    EBNNLHNC33 275 05/01/2023          Plan .  Assessment:  Allison Wilkins is a 48 y.o. female who presents with migraine and neck pain.  This continues in a similar fashion as last visit.  She had repeat MRI without any significant changes from 2021.  She has botox schedule on 12/1.  We should continue this.  Nurtec remains helpful but she does continue with Ibuprofen frequently.  We will send a Medrol pack today.  She should continue PT at this time.      Plan:  Continue Nurtec 75 mg as needed  Continue with Botox referral  Medrol Pack sent  MRI Cervical Spine  PT  Safety discussed  Call with any questions or concerns.    The patient and I have discussed the plan of care and she is in full agreement at this time.     Follow-Up:  No follow-ups on file.       Nico Mayorga, APRN  11/17/23  10:32 CST  "

## 2023-12-01 ENCOUNTER — PROCEDURE VISIT (OUTPATIENT)
Dept: NEUROLOGY | Facility: CLINIC | Age: 48
End: 2023-12-01
Payer: COMMERCIAL

## 2023-12-01 VITALS
DIASTOLIC BLOOD PRESSURE: 78 MMHG | WEIGHT: 179 LBS | BODY MASS INDEX: 30.56 KG/M2 | HEIGHT: 64 IN | HEART RATE: 73 BPM | SYSTOLIC BLOOD PRESSURE: 136 MMHG | OXYGEN SATURATION: 98 %

## 2023-12-01 DIAGNOSIS — G43.719 CHRONIC MIGRAINE WITHOUT AURA, INTRACTABLE, WITHOUT STATUS MIGRAINOSUS: Primary | ICD-10-CM

## 2023-12-01 NOTE — PROGRESS NOTES
Botox injections for Chronic Migraine        Headache log data:  See scanned data        Once again, a written consent was obtained from the patient to receive repeat injections of Botulinum toxin type A into muscles of the scalp, face, and neck in standardized fashion according to recommended by American Headache Society and widely-accepted FDA approve PREEMT trial protocol. The procedure is performed in compliance with clean technique.    155 Units of Botox injected using 30 G needle and 1 ml syringes into 31 sites as follows (45 units of 200 unit vial used are lost in dilution and transition, or discarded):    5 units into each  muscle and into the procerus muscle (15 units total)    5 units each x 2 sites into each frontalis muscle (20 units total)    5 units each x 4 sites into each temporalis muscle (40 units total)    5 units each x 3 sites into each occipitalis muscle (30 units total)    5 units each x 2 sites into each splenius capitis (20 units total)    5 units each x 3 sites into each trapezius muscle (30 units total)    Patient tolerated injections well, without any immediate side effects or complications. Patient was instructed to monitor for potential late side effects from Botox treatment, which were explained to the patient in details. Patient is instructed to call if any unusual feeling develops at the sites of injections, if there is an unexpected muscle weakness, ptosis, or any difficulty with breathing. Otherwise, patient will be brought back for the next treatment (per protocol) in 3 months.

## 2023-12-15 ENCOUNTER — HOSPITAL ENCOUNTER (OUTPATIENT)
Dept: WOMENS IMAGING | Age: 48
Discharge: HOME | End: 2023-12-15
Payer: COMMERCIAL

## 2023-12-15 DIAGNOSIS — Z12.31 ENCOUNTER FOR SCREENING MAMMOGRAM FOR MALIGNANT NEOPLASM OF BREAST: ICD-10-CM

## 2023-12-15 PROCEDURE — 77063 BREAST TOMOSYNTHESIS BI: CPT

## 2023-12-21 DIAGNOSIS — G43.009 MIGRAINE WITHOUT AURA AND WITHOUT STATUS MIGRAINOSUS, NOT INTRACTABLE: ICD-10-CM

## 2023-12-22 ENCOUNTER — TREATMENT (OUTPATIENT)
Dept: PHYSICAL THERAPY | Facility: CLINIC | Age: 48
End: 2023-12-22
Payer: COMMERCIAL

## 2023-12-22 DIAGNOSIS — M54.12 RADICULOPATHY, CERVICAL: ICD-10-CM

## 2023-12-22 DIAGNOSIS — M54.2 CERVICAL PAIN: Primary | ICD-10-CM

## 2023-12-22 DIAGNOSIS — G44.209 TENSION HEADACHE: ICD-10-CM

## 2023-12-22 NOTE — PROGRESS NOTES
Physical Therapy Treatment Note and 30 Day Progress Note  [address] [place] [address]    Patient: Allison Wilkins                                                 Visit Date: 2023  :     1975    Referring practitioner:    DEEPTI Jo  Date of Initial Visit:          Type: THERAPY  Noted: 10/27/2023    Patient seen for 3 sessions    Visit Diagnoses:    ICD-10-CM ICD-9-CM   1. Cervical pain  M54.2 723.1   2. Tension headache  G44.209 307.81   3. Radiculopathy, cervical  M54.12 723.4     SUBJECTIVE     Subjective: Pt reports that she has been better. She had some botox injections, and these  seemed to help a little bit. Reports that the HEP is going well, and she is able to get these in  once or twice a day. Reports 50% improvement since IE. She continues to have HA 3-4x/week       PAIN: 1/10 HA      OBJECTIVE     Objective     Therapeutic Exercises    55970 Units Comments   Reviewed HEP, added cervical rotation with towel roll OP and HA SNAG                 Timed Minutes 15      Manual Therapy     03594  Comments   CTJ, upper thoracic UPAs Prone    Supine R upglides, L downglides, suboccipital release, manual traction    Stm to cervical paraspinals            Timed Minutes 25     Therapy Education/Self Care [id number]   Education offered today HEP  Educated on effects of DN. Advised to hold on ice if she can.    [employer] Code  [email or web address]   Ongoing HEP   Access Code: [email or web address]  URL: [email or web address]  Date: 2023  Prepared by: Bailey Varela    Exercises  - Seated UE phasic  - 3-4 x weekly - 2 sets - 10 reps  - Seated Cervical Retraction  - 3-4 x weekly - 2 sets - 10 reps  - Seated Levator Scapulae Stretch  - 3-4 x weekly - 3 reps - 30 sec hold  - Seated Upper Trapezius Stretch  - 3-4 x weekly - 3 reps - 30 sec hold  - Seated Assisted Cervical Rotation with Towel  - 1 x daily - 7 x weekly - 1 sets - 10 reps - 5 hold    Timed Minutes         Total Timed Treatment:     40   mins  Total Time of Visit:             40   mins         ASSESSMENT/PLAN     GOALS  Goals                                          Progress Note due by 1/22/24                                                      Recert due by 1/24/24   LTG by: 6 weeks Comments Date Status   Symmetrical alexandria C1 body  Not an objectively measurable goal.  12/22  D/C   Reports no headaches except minor twinges relieved with change of position and exercises  HA 3-4x/wk  12/22  Ongoing   Improve alexandria cerv rotation to 65 deg with no pain R: 65 deg  L: 50 deg  12/22  Ongoing   Ind with HEP  reports compliance 1-2x/day 12/22  Ongoing        Assessment/Plan     ASSESSMENT: Goals assessed for Progress Note Today. No goals met at this time, as this is only her second treatment since initiation of PTx, though she does feel as though she has made improvement in her overall pain and condition. She is compliant with her HEP, which is helping. Bolstered this today with red resistance band and added self SNAG for cervicogenic headaches and a range of motion activity for her L rotation. The Pt would benefit from skilled PTx to decrease pain, improve functional mobility, progress toward goals, and increase overall quality of life.      PLAN: Assess response to HEP and today's intervention and prioritize CT mobility as well as postural strengthening.     SIGNATURE: Bailey Varela, PT, [place] License #: [id number]    Electronically Signed on 12/22/2023        Corby Velázquez. [id number]  [phone]

## 2024-02-15 DIAGNOSIS — G43.009 MIGRAINE WITHOUT AURA AND WITHOUT STATUS MIGRAINOSUS, NOT INTRACTABLE: ICD-10-CM

## 2024-02-15 RX ORDER — RIMEGEPANT SULFATE 75 MG/75MG
TABLET, ORALLY DISINTEGRATING ORAL
Qty: 8 TABLET | Refills: 2 | Status: SHIPPED | OUTPATIENT
Start: 2024-02-15

## 2024-02-16 ENCOUNTER — OFFICE VISIT (OUTPATIENT)
Dept: NEUROLOGY | Facility: CLINIC | Age: 49
End: 2024-02-16
Payer: COMMERCIAL

## 2024-02-16 VITALS
HEART RATE: 72 BPM | DIASTOLIC BLOOD PRESSURE: 64 MMHG | WEIGHT: 177 LBS | HEIGHT: 64 IN | RESPIRATION RATE: 18 BRPM | BODY MASS INDEX: 30.22 KG/M2 | SYSTOLIC BLOOD PRESSURE: 124 MMHG

## 2024-02-16 DIAGNOSIS — G43.719 CHRONIC MIGRAINE WITHOUT AURA, INTRACTABLE, WITHOUT STATUS MIGRAINOSUS: Primary | ICD-10-CM

## 2024-02-16 DIAGNOSIS — G44.40 MEDICATION OVERUSE HEADACHE: ICD-10-CM

## 2024-02-16 DIAGNOSIS — M79.18 CERVICAL MYOFASCIAL PAIN SYNDROME: ICD-10-CM

## 2024-02-16 PROCEDURE — 99213 OFFICE O/P EST LOW 20 MIN: CPT | Performed by: NURSE PRACTITIONER

## 2024-03-01 ENCOUNTER — PROCEDURE VISIT (OUTPATIENT)
Dept: NEUROLOGY | Facility: CLINIC | Age: 49
End: 2024-03-01
Payer: COMMERCIAL

## 2024-03-01 DIAGNOSIS — G43.719 CHRONIC MIGRAINE WITHOUT AURA, INTRACTABLE, WITHOUT STATUS MIGRAINOSUS: Primary | ICD-10-CM

## 2024-03-07 DIAGNOSIS — G43.009 MIGRAINE WITHOUT AURA AND WITHOUT STATUS MIGRAINOSUS, NOT INTRACTABLE: ICD-10-CM

## 2024-03-07 RX ORDER — RIMEGEPANT SULFATE 75 MG/75MG
75 TABLET, ORALLY DISINTEGRATING ORAL AS NEEDED
Qty: 8 TABLET | Refills: 2 | Status: SHIPPED | OUTPATIENT
Start: 2024-03-07

## 2024-03-08 DIAGNOSIS — G43.009 MIGRAINE WITHOUT AURA AND WITHOUT STATUS MIGRAINOSUS, NOT INTRACTABLE: ICD-10-CM

## 2024-03-12 RX ORDER — RIMEGEPANT SULFATE 75 MG/75MG
75 TABLET, ORALLY DISINTEGRATING ORAL AS NEEDED
Qty: 8 TABLET | Refills: 2 | Status: SHIPPED | OUTPATIENT
Start: 2024-03-12

## 2024-04-09 ENCOUNTER — PREP FOR SURGERY (OUTPATIENT)
Dept: OTHER | Facility: HOSPITAL | Age: 49
End: 2024-04-09
Payer: COMMERCIAL

## 2024-04-09 ENCOUNTER — TELEPHONE (OUTPATIENT)
Dept: GASTROENTEROLOGY | Facility: CLINIC | Age: 49
End: 2024-04-09
Payer: COMMERCIAL

## 2024-04-09 DIAGNOSIS — Z83.719 FAMILY HX COLONIC POLYPS: ICD-10-CM

## 2024-04-09 DIAGNOSIS — Z12.11 ENCOUNTER FOR SCREENING FOR MALIGNANT NEOPLASM OF COLON: Primary | ICD-10-CM

## 2024-04-26 DIAGNOSIS — G43.009 MIGRAINE WITHOUT AURA AND WITHOUT STATUS MIGRAINOSUS, NOT INTRACTABLE: ICD-10-CM

## 2024-04-26 RX ORDER — RIMEGEPANT SULFATE 75 MG/75MG
75 TABLET, ORALLY DISINTEGRATING ORAL AS NEEDED
Qty: 8 TABLET | Refills: 2 | Status: SHIPPED | OUTPATIENT
Start: 2024-04-26

## 2024-04-26 NOTE — TELEPHONE ENCOUNTER
Received Nurtec refill request from [facility], they use [employer] to dispense it.  The script sent to [facility] can't be processed.  They need a new script sent to [facility].

## 2024-05-16 DIAGNOSIS — G43.009 MIGRAINE WITHOUT AURA AND WITHOUT STATUS MIGRAINOSUS, NOT INTRACTABLE: ICD-10-CM

## 2024-05-16 RX ORDER — RIMEGEPANT SULFATE 75 MG/75MG
75 TABLET, ORALLY DISINTEGRATING ORAL AS NEEDED
Qty: 8 TABLET | Refills: 2 | Status: CANCELLED | OUTPATIENT
Start: 2024-05-16

## 2024-05-16 RX ORDER — RIMEGEPANT SULFATE 75 MG/75MG
75 TABLET, ORALLY DISINTEGRATING ORAL AS NEEDED
Qty: 8 TABLET | Refills: 2 | Status: SHIPPED | OUTPATIENT
Start: 2024-05-16

## 2024-05-16 NOTE — TELEPHONE ENCOUNTER
Caller: Allison Wilkins    Relationship: Self    Best call back number: [phone]    Requested Prescriptions:   Requested Prescriptions     Pending Prescriptions Disp Refills    Rimegepant Sulfate (Nurtec) 75 MG tablet dispersible tablet 8 tablet 2     Sig: Take 1 tablet by mouth As Needed (Migraine).        Pharmacy where request should be sent: [facility] (NEW ADDRESS) - [address] PREVIOUSLY: [facility] [address] [phone] [facility] [phone]      Last office visit with prescribing clinician: 2/16/2024   Last telemedicine visit with prescribing clinician: Visit date not found   Next office visit with prescribing clinician: 5/17/2024     Additional details provided by patient: PATIENT IS OUT OF HER MEDICATION AND PHARMACY STATED THEY HAD NO REFILLS    Does the patient have less than a 3 day supply:  [x] Yes  [] No    Would you like a call back once the refill request has been completed: [] Yes [x] No    If the office needs to give you a call back, can they leave a voicemail: [] Yes [x] No    Manju Story Rep   05/16/24 10:07 CDT

## 2024-05-17 ENCOUNTER — OFFICE VISIT (OUTPATIENT)
Dept: NEUROLOGY | Facility: CLINIC | Age: 49
End: 2024-05-17
Payer: COMMERCIAL

## 2024-05-17 VITALS
OXYGEN SATURATION: 99 % | SYSTOLIC BLOOD PRESSURE: 122 MMHG | BODY MASS INDEX: 30.22 KG/M2 | HEART RATE: 58 BPM | HEIGHT: 64 IN | WEIGHT: 177 LBS | DIASTOLIC BLOOD PRESSURE: 68 MMHG

## 2024-05-17 DIAGNOSIS — M79.18 CERVICAL MYOFASCIAL PAIN SYNDROME: ICD-10-CM

## 2024-05-17 DIAGNOSIS — G43.719 CHRONIC MIGRAINE WITHOUT AURA, INTRACTABLE, WITHOUT STATUS MIGRAINOSUS: Primary | ICD-10-CM

## 2024-05-17 PROCEDURE — 99213 OFFICE O/P EST LOW 20 MIN: CPT | Performed by: NURSE PRACTITIONER

## 2024-05-17 NOTE — ASSESSMENT & PLAN NOTE
She continues to have some intermittent neck pain but this is also improved recently.  I continue to recommend stretching and no other intervention at this time.

## 2024-05-17 NOTE — PROGRESS NOTES
Neurology Progress Note      Chief Complaint:    Headache   Neck Pain    Subjective   History of Present Illness:  Allison Wilkins is a 48 y.o. female who presents today for migraine and neck pain.  She is routinely followed by Rodney George MD for primary care.     Migraine  She notes that she has had an improvement of her headaches since last being seen.  She hasn't had any severe headaches since last being seen but has noted that she will still have some non-migrainous headaches. She also continues to have some intermittent neck pain but overall she reports that her symptoms are Dr. Dial improved.  She still takes Nurtec on occasion but overall has no new complaints.    Preventative Medications Tried:  Amitriptyline    Abortive Medications Tried:  Imitrex and Maxalt    Allergies:    Patient has no known allergies.    Medications:  Current Outpatient Medications   Medication Sig Dispense Refill    cetirizine (zyrTEC) 10 MG tablet Take 1 tablet by mouth Daily.      dicyclomine (BENTYL) 10 MG capsule Take 1 capsule by mouth Every 6 (Six) Hours As Needed (prn). 60 capsule 2    FLUoxetine (PROzac) 20 MG capsule Take 1 capsule by mouth Daily.      hydroCHLOROthiazide (HYDRODIURIL) 25 MG tablet Take 1 tablet by mouth Daily.      levothyroxine (SYNTHROID, LEVOTHROID) 175 MCG tablet Take 1 tablet by mouth Daily.      nystatin-triamcinolone (MYCOLOG) 612700-2.1 UNIT/GM-% ointment As Needed.      ondansetron ODT (ZOFRAN-ODT) 8 MG disintegrating tablet Place 1 tablet under the tongue As Needed.      pantoprazole (PROTONIX) 40 MG EC tablet Take 1 tablet by mouth 2 (Two) Times a Day. 60 tablet 11    Rimegepant Sulfate (Nurtec) 75 MG tablet dispersible tablet Take 1 tablet by mouth As Needed (Migraine). 8 tablet 2    sucralfate (CARAFATE) 1 g tablet Take 1 tablet by mouth Daily As Needed.      tiZANidine (ZANAFLEX) 4 MG tablet Take 1 tablet by mouth At Night As Needed for Muscle Spasms. 30 tablet 0    triamcinolone  "(KENALOG) 0.1 % ointment        No current facility-administered medications for this visit.     Current outpatient and discharge medications have been reconciled for the patient.  Reviewed by: DEEPTI Jo    Past Medical History:  Past Medical History:   Diagnosis Date    Stewart syndrome     Disease of thyroid gland     Eosinophilic esophagitis     GERD (gastroesophageal reflux disease)     Heavy periods     with pelvic pain    Hemorrhoids     Hypertension     IBS (irritable bowel syndrome)     PONV (postoperative nausea and vomiting)      Past Surgical History:   Procedure Laterality Date    CHOLECYSTECTOMY      DILATION AND CURETTAGE, DIAGNOSTIC / THERAPEUTIC      ENDOSCOPY N/A 12/29/2016    Procedure: ESOPHAGOGASTRODUODENOSCOPY WITH ANESTHESIA;  Surgeon: Theodore Traylor MD;  Location: [facility] ENDOSCOPY;  Service:     ENDOSCOPY N/A 11/8/2019    Procedure: ESOPHAGOGASTRODUODENOSCOPY WITH ANESTHESIA;  Surgeon: Theodore Traylor MD;  Location: [facility] ENDOSCOPY;  Service: Gastroenterology    ENDOSCOPY AND COLONOSCOPY  12/04/2013    TOTAL LAPAROSCOPIC HYSTERECTOMY N/A 12/21/2017    Procedure: TOTAL LAPAROSCOPIC HYSTERECTOMY WITH DAVINCI SI ROBOT;  Surgeon: Joyce Mcfadden MD;  Location: [facility] OR;  Service:     TUBAL ABDOMINAL LIGATION       Family History   Problem Relation Age of Onset    Colon polyps Mother     Diverticulitis Mother     Colon polyps Father     Colon polyps Maternal Grandmother     Colon cancer Neg Hx      Social History     Tobacco Use    Smoking status: Never    Smokeless tobacco: Never   Vaping Use    Vaping status: Never Used   Substance Use Topics    Alcohol use: Yes     Comment: rare    Drug use: No     Review of Systems   Musculoskeletal:  Positive for neck pain.   Neurological:  Positive for headache.         Objective   Vital Signs:  Heart Rate:  [58] 58  BP: (122)/(68) 122/68      05/17/24  1019   Weight: 80.3 kg (177 lb)       162.6 cm (64\")  Body mass index is 30.38 " kg/m².    Physical Exam  Vitals reviewed.   Constitutional:       Appearance: Normal appearance.   HENT:      Head: Normocephalic.      Mouth/Throat:      Pharynx: Oropharynx is clear.   Eyes:      General: Lids are normal.      Extraocular Movements: Extraocular movements intact.      Pupils: Pupils are equal, round, and reactive to light.   Cardiovascular:      Rate and Rhythm: Normal rate and regular rhythm.      Pulses: Normal pulses.   Pulmonary:      Effort: Pulmonary effort is normal.   Musculoskeletal:      Cervical back: Neck supple. Muscular tenderness present. Decreased range of motion.   Skin:     General: Skin is warm and dry.      Capillary Refill: Capillary refill takes less than 2 seconds.   Neurological:      Motor: Motor strength is normal.     Coordination: Coordination is intact.      Deep Tendon Reflexes: Reflexes are normal and symmetric.   Psychiatric:         Mood and Affect: Mood normal.         Speech: Speech normal.       Neurological Exam  Mental Status  Awake, alert and oriented to person, place and time. Recent and remote memory are intact. Speech is normal. Language is fluent with no aphasia. Attention and concentration are normal.    Cranial Nerves  CN II: Visual acuity is normal. Visual fields full to confrontation.  CN III, IV, VI: Extraocular movements intact bilaterally. Normal lids and orbits bilaterally. Pupils equal round and reactive to light bilaterally.  CN V: Facial sensation is normal.  CN VII: Full and symmetric facial movement.  CN IX, X: Palate elevates symmetrically. Normal gag reflex.  CN XI: Shoulder shrug strength is normal.  CN XII: Tongue midline without atrophy or fasciculations.    Motor   Strength is 5/5 throughout all four extremities.    Sensory  Sensation is intact to light touch, pinprick, vibration and proprioception in all four extremities.    Reflexes  Deep tendon reflexes are 2+ and symmetric in all four extremities.    Coordination    Finger-to-nose,  "rapid alternating movements and heel-to-shin normal bilaterally without dysmetria.    Gait  Normal casual, toe, heel and tandem gait.    No cubital Tinel sign today    Results Review:    Lab Results   Component Value Date    GLUCOSE 119 (H) 12/22/2017    BUN 8 12/22/2017    CREATININE 0.70 07/09/2021    EGFRIFNONA 99 12/22/2017    BCR 12.3 12/22/2017    K 3.9 12/22/2017    CO2 26.0 12/22/2017    CALCIUM 9.0 12/22/2017    ALBUMIN 4.9 05/03/2016     (H) 05/03/2016     (H) 05/03/2016     Lab Results   Component Value Date    WBC 5.6 03/24/2022    HGB 12.2 03/24/2022    HCT 37.5 03/24/2022    MCV 92.8 03/24/2022     03/24/2022     No results found for: \"CHOL\", \"CHLPL\", \"TRIG\", \"HDL\", \"LDL\", \"LDLDIRECT\"  Lab Results   Component Value Date    TSH 1.058 05/01/2023     No results found for: \"HGBA1C\"  No results found for: \"FOLATE\"  Lab Results   Component Value Date    [id number] 275 05/01/2023     MRI Cervical Spine Without Contrast (11/03/2023 09:39)      Plan   Diagnoses and all orders for this visit:    1. Chronic migraine without aura, intractable, without status migrainosus (Primary)  Assessment & Plan:  Doing well with Botox.  She has noted drastic improvements in her headaches and is no longer having to use Tylenol on a frequent occasion.  Will continue Botox and Nurtec at this time.      2. Cervical myofascial pain syndrome  Assessment & Plan:  She continues to have some intermittent neck pain but this is also improved recently.  I continue to recommend stretching and no other intervention at this time.        Follow-Up:  Return in about 6 months (around 11/17/2024) for Migraine.       Nico Mayorga, DEEPTI  05/17/24  11:39 CDT  "

## 2024-05-17 NOTE — ASSESSMENT & PLAN NOTE
Doing well with Botox.  She has noted drastic improvements in her headaches and is no longer having to use Tylenol on a frequent occasion.  Will continue Botox and Nurtec at this time.

## 2024-06-14 ENCOUNTER — PROCEDURE VISIT (OUTPATIENT)
Dept: NEUROLOGY | Facility: CLINIC | Age: 49
End: 2024-06-14
Payer: COMMERCIAL

## 2024-06-14 DIAGNOSIS — G43.719 CHRONIC MIGRAINE WITHOUT AURA, INTRACTABLE, WITHOUT STATUS MIGRAINOSUS: Primary | ICD-10-CM

## 2024-06-14 DIAGNOSIS — G43.009 MIGRAINE WITHOUT AURA AND WITHOUT STATUS MIGRAINOSUS, NOT INTRACTABLE: ICD-10-CM

## 2024-06-19 ENCOUNTER — PATIENT MESSAGE (OUTPATIENT)
Dept: OBGYN CLINIC | Age: 49
End: 2024-06-19

## 2024-06-19 DIAGNOSIS — R10.2 PELVIC PAIN: Primary | ICD-10-CM

## 2024-06-20 NOTE — TELEPHONE ENCOUNTER
From: Tina Goldman  To: Susan Bonds APRN - CNM  Sent: 6/19/2024 8:30 PM CDT  Subject: Ultrasound     I was looking for the ultrasound order that we had discussed at my last visit when I had mentioned my left side bothering me and wondered if it was my ovaries. My left side has been bothering me more and I think I need to have it looked at. If you could send me that order I would appreciate it.     Thanks,  Tina

## 2024-08-15 DIAGNOSIS — G43.009 MIGRAINE WITHOUT AURA AND WITHOUT STATUS MIGRAINOSUS, NOT INTRACTABLE: ICD-10-CM

## 2024-08-16 RX ORDER — RIMEGEPANT SULFATE 75 MG/75MG
75 TABLET, ORALLY DISINTEGRATING ORAL AS NEEDED
Qty: 8 TABLET | Refills: 2 | Status: SHIPPED | OUTPATIENT
Start: 2024-08-16

## 2024-09-06 ENCOUNTER — OFFICE VISIT (OUTPATIENT)
Dept: GASTROENTEROLOGY | Facility: CLINIC | Age: 49
End: 2024-09-06
Payer: COMMERCIAL

## 2024-09-06 VITALS
TEMPERATURE: 97.7 F | HEIGHT: 64 IN | OXYGEN SATURATION: 98 % | HEART RATE: 75 BPM | SYSTOLIC BLOOD PRESSURE: 132 MMHG | BODY MASS INDEX: 30.39 KG/M2 | DIASTOLIC BLOOD PRESSURE: 80 MMHG | WEIGHT: 178 LBS

## 2024-09-06 DIAGNOSIS — Z12.11 ENCOUNTER FOR SCREENING FOR MALIGNANT NEOPLASM OF COLON: Primary | ICD-10-CM

## 2024-09-06 DIAGNOSIS — Z83.719 FAMILY HX COLONIC POLYPS: ICD-10-CM

## 2024-09-06 DIAGNOSIS — K21.9 GASTROESOPHAGEAL REFLUX DISEASE, UNSPECIFIED WHETHER ESOPHAGITIS PRESENT: ICD-10-CM

## 2024-09-06 DIAGNOSIS — K58.9 IRRITABLE BOWEL SYNDROME WITHOUT DIARRHEA: ICD-10-CM

## 2024-09-06 RX ORDER — SODIUM PICOSULFATE, MAGNESIUM OXIDE, AND ANHYDROUS CITRIC ACID 10; 3.5; 12 MG/160ML; G/160ML; G/160ML
160 LIQUID ORAL ONCE
Qty: 320 ML | Refills: 0 | Status: SHIPPED | OUTPATIENT
Start: 2024-09-06 | End: 2024-09-06

## 2024-09-06 RX ORDER — SODIUM PICOSULFATE, MAGNESIUM OXIDE, AND ANHYDROUS CITRIC ACID 10; 3.5; 12 MG/160ML; G/160ML; G/160ML
160 LIQUID ORAL ONCE
Qty: 320 ML | Refills: 0 | COMMUNITY
Start: 2024-09-06 | End: 2024-09-06

## 2024-09-06 RX ORDER — DICYCLOMINE HYDROCHLORIDE 10 MG/1
10 CAPSULE ORAL EVERY 6 HOURS PRN
Qty: 60 CAPSULE | Refills: 2 | Status: SHIPPED | OUTPATIENT
Start: 2024-09-06

## 2024-09-06 RX ORDER — PANTOPRAZOLE SODIUM 40 MG/1
40 TABLET, DELAYED RELEASE ORAL 2 TIMES DAILY
Qty: 60 TABLET | Refills: 11 | Status: SHIPPED | OUTPATIENT
Start: 2024-09-06

## 2024-09-06 NOTE — PROGRESS NOTES
[facility] GASTROENTEROLOGY - OFFICE NOTE    9/6/2024    Allison Wilkins   1975    Primary Physician: Rodney George MD    Chief Complaint   Patient presents with    GI Problem     C/O trouble swallowing, food gets stuck, also needs colonoscopy         HISTORY OF PRESENT ILLNESS:     Allison Wilkins is a 48 y.o. female presents with dysphagia that started several mo ago. She points to the upper chest area where this occurs. Taking a drink does not help. Has had to regurgitate to get relief. Takes protonix once daily and sometimes twice daily.          No change in bowel habits. Has had intermittent bright red blood per rectum for yrs. No weight loss.       IBS ( alternating bowel habits).   Taking bentyl prn and needs refill.  No significant changes.         UPPER GI ENDOSCOPY (11/08/2019 09:50)     SCANNED - COLONOSCOPY (12/04/2013 00:00) repeat age 49.         Past Medical History:   Diagnosis Date    Stewart syndrome     Disease of thyroid gland     Eosinophilic esophagitis     GERD (gastroesophageal reflux disease)     Heavy periods     with pelvic pain    Hemorrhoids     Hypertension     IBS (irritable bowel syndrome)     PONV (postoperative nausea and vomiting)        Past Surgical History:   Procedure Laterality Date    CHOLECYSTECTOMY      DILATION AND CURETTAGE, DIAGNOSTIC / THERAPEUTIC      ENDOSCOPY N/A 12/29/2016    Procedure: ESOPHAGOGASTRODUODENOSCOPY WITH ANESTHESIA;  Surgeon: Theodore Traylor MD;  Location: [facility] ENDOSCOPY;  Service:     ENDOSCOPY N/A 11/8/2019    Procedure: ESOPHAGOGASTRODUODENOSCOPY WITH ANESTHESIA;  Surgeon: Theodore Traylor MD;  Location: [facility] ENDOSCOPY;  Service: Gastroenterology    ENDOSCOPY AND COLONOSCOPY  12/04/2013    TOTAL LAPAROSCOPIC HYSTERECTOMY N/A 12/21/2017    Procedure: TOTAL LAPAROSCOPIC HYSTERECTOMY WITH DAVINCI SI ROBOT;  Surgeon: Joyce Mcfadden MD;  Location: [facility] OR;  Service:     TUBAL ABDOMINAL LIGATION         Outpatient  Medications Marked as Taking for the 9/6/24 encounter (Office Visit) with Tashia Bahena APRN   Medication Sig Dispense Refill    cetirizine (zyrTEC) 10 MG tablet Take 1 tablet by mouth Daily.      dicyclomine (BENTYL) 10 MG capsule Take 1 capsule by mouth Every 6 (Six) Hours As Needed (prn). 60 capsule 2    FLUoxetine (PROzac) 20 MG capsule Take 1 capsule by mouth Daily.      hydroCHLOROthiazide (HYDRODIURIL) 25 MG tablet Take 1 tablet by mouth Daily.      levothyroxine (SYNTHROID, LEVOTHROID) 175 MCG tablet Take 1 tablet by mouth Daily.      nystatin-triamcinolone (MYCOLOG) 999587-9.1 UNIT/GM-% ointment As Needed.      ondansetron ODT (ZOFRAN-ODT) 8 MG disintegrating tablet Place 1 tablet under the tongue As Needed.      pantoprazole (PROTONIX) 40 MG EC tablet Take 1 tablet by mouth 2 (Two) Times a Day. 60 tablet 11    Rimegepant Sulfate (Nurtec) 75 MG tablet dispersible tablet Take 1 tablet by mouth As Needed (Migraine). 8 tablet 2    tiZANidine (ZANAFLEX) 4 MG tablet Take 1 tablet by mouth At Night As Needed for Muscle Spasms. 30 tablet 0    [DISCONTINUED] dicyclomine (BENTYL) 10 MG capsule Take 1 capsule by mouth Every 6 (Six) Hours As Needed (prn). 60 capsule 2    [DISCONTINUED] pantoprazole (PROTONIX) 40 MG EC tablet Take 1 tablet by mouth 2 (Two) Times a Day. 60 tablet 11       No Known Allergies    Social History     Socioeconomic History    Marital status:    Tobacco Use    Smoking status: Never    Smokeless tobacco: Never   Vaping Use    Vaping status: Never Used   Substance and Sexual Activity    Alcohol use: Yes     Comment: rare    Drug use: No    Sexual activity: Defer       Family History   Problem Relation Age of Onset    Colon polyps Mother     Diverticulitis Mother     Colon polyps Father     Colon polyps Maternal Grandmother     Colon cancer Neg Hx        Review of Systems   Constitutional:  Negative for chills, fever and unexpected weight change.   Respiratory:  Negative for shortness of  "breath.    Cardiovascular:  Negative for chest pain.   Gastrointestinal:  Negative for abdominal distention, abdominal pain, nausea and vomiting.        Vitals:    09/06/24 0854   BP: 132/80   Pulse: 75   Temp: 97.7 °F (36.5 °C)   SpO2: 98%   Weight: 80.7 kg (178 lb)   Height: 162.6 cm (64\")      Body mass index is 30.55 kg/m².    Physical Exam  Vitals reviewed.   Constitutional:       General: She is not in acute distress.  Cardiovascular:      Rate and Rhythm: Normal rate and regular rhythm.      Heart sounds: Normal heart sounds.   Pulmonary:      Effort: Pulmonary effort is normal.      Breath sounds: Normal breath sounds.   Abdominal:      General: Bowel sounds are normal. There is no distension.      Palpations: Abdomen is soft.      Tenderness: There is no abdominal tenderness.   Skin:     General: Skin is warm and dry.   Neurological:      Mental Status: She is alert.                 ASSESSMENT AND PLAN    Assessment & Plan     Diagnoses and all orders for this visit:    1. Encounter for screening for malignant neoplasm of colon (Primary)  -     Case Request; Standing  -     Case Request    2. Family hx colonic polyps  -     Case Request; Standing  -     Case Request    3. Gastroesophageal reflux disease, unspecified whether esophagitis present  -     Case Request; Standing  -     Case Request    4. Irritable bowel syndrome without diarrhea  -     Case Request; Standing  -     Case Request    Other orders  -     Implement Anesthesia Orders Day of Procedure; Standing  -     Obtain Informed Consent; Standing  -     Discontinue: Sod Picosulfate-Mag Ox-Cit Acd (Clenpiq) 10-3.5-12 MG-GM -GM/160ML solution; Take 160 mL by mouth 1 (One) Time for 1 dose. Take as directed  Dispense: 320 mL; Refill: 0  -     dicyclomine (BENTYL) 10 MG capsule; Take 1 capsule by mouth Every 6 (Six) Hours As Needed (prn).  Dispense: 60 capsule; Refill: 2  -     pantoprazole (PROTONIX) 40 MG EC tablet; Take 1 tablet by mouth 2 (Two) " Times a Day.  Dispense: 60 tablet; Refill: 11  -     Sod Picosulfate-Mag Ox-Cit Acd (Clenpiq) 10-3.5-12 MG-GM -GM/160ML solution; Take 160 mL by mouth 1 (One) Time for 1 dose. Take as directed  Dispense: 320 mL; Refill: 0    Schedule colonoscopy.       Gerd is mostly stable but is taking ppi once daily and sometimes twice daily.  I recommend strict anti reflux precautions. Continue ppi daily. Refill ppi.        In regard to IBS, this is stable. No significant changes. I recommend fiber supplement daily. Refill bentyl as this has helped.      Office visit  1 year.       ESOPHAGOGASTRODUODENOSCOPY WITH ANESTHESIA (N/A), COLONOSCOPY WITH ANESTHESIA (N/A)  Risk, benefits, and alternatives of endoscopy were explained in full.  They understand that there is a risk of bleeding, perforation, and infection.  The risk of perforation goes up with esophageal dilation.  Other options to evaluate UGI complaints could involve barium swallow or UGI series, but these would be diagnostic tests only.  Patient was given time to ask questions.  I answered them to their satisfaction and they are agreeable to proceeding     All risks, benefits, alternatives, and indications of colonoscopy procedure have been discussed with the patient. Risks to include perforation of the colon requiring possible surgery or colostomy, risk of bleeding from biopsies or removal of colon tissue, possibility of missing a colon polyp or cancer, or adverse drug reaction.  Benefits to include the diagnosis and management of disease of the colon and rectum. Alternatives to include barium enema, radiographic evaluation, lab testing or no intervention. Pt verbalizes understanding and agrees.       Return in about 1 year (around 9/6/2025).          There are no Patient Instructions on file for this visit.      Tashia Bahena, DEEPTI

## 2024-09-09 ENCOUNTER — TELEPHONE (OUTPATIENT)
Dept: NEUROLOGY | Facility: CLINIC | Age: 49
End: 2024-09-09
Payer: COMMERCIAL

## 2024-09-09 NOTE — TELEPHONE ENCOUNTER
Caller: Allison Wilkins    Relationship to patient: Self    Best call back number:[phone]      Chief complaint: MORE FREQUENT HEADACHES, WOULD LIKE TO COME IN BEFORE HER SCHEDULED APPT ON FRIDAY, 09/13. INDICATES SHE PROBABLY NEEDS TO MAKE HER APPTS CLOSER TOGETHER WITH THE MORE FREQUENT HEADACHES.      Type of visit: BOTOX    Requested date: ASAP     If rescheduling, when is the original appointment: 09/13/24     Additional notes: UNSUCCESSFUL WT WAS ATTEMPTED.    PLEASE REVIEW AND ADVISE.

## 2024-09-09 NOTE — TELEPHONE ENCOUNTER
No change to meds. Take Nurtec today, Wednesday and Friday morning. Will cover till botox on Friday. Can give samples if she needs

## 2024-09-11 NOTE — PROGRESS NOTES
Botox injections for Chronic Migraine           Headache log data:  See scanned data      The patient has had significant improvement in migraine intensity and frequency. Will benefit from continued injections.         Once again, a written consent was obtained from the patient to receive repeat injections of Botulinum toxin type A into muscles of the scalp, face, and neck in standardized fashion according to recommended by [employer] Society and widely-accepted FDA approve PREEMT trial protocol. The procedure is performed in compliance with clean technique.     155 Units of Botox injected using 30 G needle and 1 ml syringes into 31 sites as follows (45 units of 200 unit vial used are lost in dilution and transition, or discarded):     5 units into each  muscle and into the procerus muscle (15 units total)     5 units each x 2 sites into each frontalis muscle (20 units total)     5 units each x 4 sites into each temporalis muscle (40 units total)     5 units each x 3 sites into each occipitalis muscle (30 units total)     5 units each x 2 sites into each splenius capitis (20 units total)     5 units each x 3 sites into each trapezius muscle (30 units total)     Patient tolerated injections well, without any immediate side effects or complications. Patient was instructed to monitor for potential late side effects from Botox treatment, which were explained to the patient in details. Patient is instructed to call if any unusual feeling develops at the sites of injections, if there is an unexpected muscle weakness, ptosis, or any difficulty with breathing. Otherwise, patient will be brought back for the next treatment (per protocol) in 3 months.

## 2024-09-13 ENCOUNTER — PROCEDURE VISIT (OUTPATIENT)
Dept: NEUROLOGY | Facility: CLINIC | Age: 49
End: 2024-09-13
Payer: COMMERCIAL

## 2024-09-13 DIAGNOSIS — G43.009 MIGRAINE WITHOUT AURA AND WITHOUT STATUS MIGRAINOSUS, NOT INTRACTABLE: ICD-10-CM

## 2024-09-13 DIAGNOSIS — G43.719 CHRONIC MIGRAINE WITHOUT AURA, INTRACTABLE, WITHOUT STATUS MIGRAINOSUS: Primary | ICD-10-CM

## 2024-09-13 RX ORDER — RIMEGEPANT SULFATE 75 MG/75MG
75 TABLET, ORALLY DISINTEGRATING ORAL AS NEEDED
Qty: 16 TABLET | Refills: 5 | Status: SHIPPED | OUTPATIENT
Start: 2024-09-13

## 2024-10-31 ENCOUNTER — ANESTHESIA (OUTPATIENT)
Dept: GASTROENTEROLOGY | Facility: HOSPITAL | Age: 49
End: 2024-10-31
Payer: COMMERCIAL

## 2024-10-31 ENCOUNTER — TELEPHONE (OUTPATIENT)
Dept: GASTROENTEROLOGY | Facility: CLINIC | Age: 49
End: 2024-10-31
Payer: COMMERCIAL

## 2024-10-31 ENCOUNTER — HOSPITAL ENCOUNTER (OUTPATIENT)
Facility: HOSPITAL | Age: 49
Setting detail: HOSPITAL OUTPATIENT SURGERY
Discharge: HOME | End: 2024-10-31
Attending: INTERNAL MEDICINE | Admitting: INTERNAL MEDICINE
Payer: COMMERCIAL

## 2024-10-31 ENCOUNTER — ANESTHESIA EVENT (OUTPATIENT)
Dept: GASTROENTEROLOGY | Facility: HOSPITAL | Age: 49
End: 2024-10-31
Payer: COMMERCIAL

## 2024-10-31 VITALS
BODY MASS INDEX: 30.22 KG/M2 | HEART RATE: 56 BPM | RESPIRATION RATE: 14 BRPM | DIASTOLIC BLOOD PRESSURE: 83 MMHG | TEMPERATURE: 97 F | OXYGEN SATURATION: 100 % | WEIGHT: 177 LBS | SYSTOLIC BLOOD PRESSURE: 124 MMHG | HEIGHT: 64 IN

## 2024-10-31 PROCEDURE — 25010000002 PROPOFOL 10 MG/ML EMULSION: Performed by: NURSE ANESTHETIST, CERTIFIED REGISTERED

## 2024-10-31 PROCEDURE — 25010000002 LIDOCAINE PF 2% 2 % SOLUTION: Performed by: NURSE ANESTHETIST, CERTIFIED REGISTERED

## 2024-10-31 PROCEDURE — 43248 EGD GUIDE WIRE INSERTION: CPT | Performed by: INTERNAL MEDICINE

## 2024-10-31 PROCEDURE — 45378 DIAGNOSTIC COLONOSCOPY: CPT | Performed by: INTERNAL MEDICINE

## 2024-10-31 RX ORDER — SODIUM CHLORIDE 0.9 % (FLUSH) 0.9 %
10 SYRINGE (ML) INJECTION AS NEEDED
Status: CANCELLED | OUTPATIENT
Start: 2024-10-31

## 2024-10-31 RX ORDER — ONDANSETRON 2 MG/ML
4 INJECTION INTRAMUSCULAR; INTRAVENOUS ONCE AS NEEDED
Status: DISCONTINUED | OUTPATIENT
Start: 2024-10-31 | End: 2024-10-31 | Stop reason: HOSPADM

## 2024-10-31 RX ORDER — LIDOCAINE HYDROCHLORIDE 10 MG/ML
0.5 INJECTION, SOLUTION EPIDURAL; INFILTRATION; INTRACAUDAL; PERINEURAL ONCE AS NEEDED
Status: CANCELLED | OUTPATIENT
Start: 2024-10-31

## 2024-10-31 RX ORDER — SODIUM CHLORIDE 0.9 % (FLUSH) 0.9 %
10 SYRINGE (ML) INJECTION AS NEEDED
Status: DISCONTINUED | OUTPATIENT
Start: 2024-10-31 | End: 2024-10-31 | Stop reason: HOSPADM

## 2024-10-31 RX ORDER — PROPOFOL 10 MG/ML
VIAL (ML) INTRAVENOUS AS NEEDED
Status: DISCONTINUED | OUTPATIENT
Start: 2024-10-31 | End: 2024-10-31 | Stop reason: SURG

## 2024-10-31 RX ORDER — SODIUM CHLORIDE 9 MG/ML
500 INJECTION, SOLUTION INTRAVENOUS CONTINUOUS PRN
Status: DISCONTINUED | OUTPATIENT
Start: 2024-10-31 | End: 2024-10-31 | Stop reason: HOSPADM

## 2024-10-31 RX ORDER — SODIUM CHLORIDE 0.9 % (FLUSH) 0.9 %
10 SYRINGE (ML) INJECTION EVERY 12 HOURS SCHEDULED
Status: CANCELLED | OUTPATIENT
Start: 2024-10-31

## 2024-10-31 RX ORDER — LIDOCAINE HYDROCHLORIDE 20 MG/ML
INJECTION, SOLUTION EPIDURAL; INFILTRATION; INTRACAUDAL; PERINEURAL AS NEEDED
Status: DISCONTINUED | OUTPATIENT
Start: 2024-10-31 | End: 2024-10-31 | Stop reason: SURG

## 2024-10-31 RX ADMIN — Medication 10 ML: at 07:23

## 2024-10-31 RX ADMIN — PROPOFOL 550 MG: 10 INJECTION, EMULSION INTRAVENOUS at 07:52

## 2024-10-31 RX ADMIN — LIDOCAINE HYDROCHLORIDE 50 MG: 20 INJECTION, SOLUTION EPIDURAL; INFILTRATION; INTRACAUDAL; PERINEURAL at 07:51

## 2024-10-31 NOTE — H&P
[facility] Gastroenterology  Pre Procedure History & Physical    Chief Complaint:   Dysphagia    Subjective     HPI:   The patient complained of dysphagia she has intermittent dysphagia with solid foods.  She presents for endoscopy.  She is also here for screening colonoscopy exam.    Past Medical History:   Past Medical History:   Diagnosis Date    Stewart syndrome     Disease of thyroid gland     Eosinophilic esophagitis     GERD (gastroesophageal reflux disease)     Heavy periods     with pelvic pain    Hemorrhoids     IBS (irritable bowel syndrome)        Past Surgical History:  Past Surgical History:   Procedure Laterality Date    CHOLECYSTECTOMY      DILATION AND CURETTAGE, DIAGNOSTIC / THERAPEUTIC      ENDOSCOPY N/A 12/29/2016    Procedure: ESOPHAGOGASTRODUODENOSCOPY WITH ANESTHESIA;  Surgeon: Theodore Traylor MD;  Location: [facility] ENDOSCOPY;  Service:     ENDOSCOPY N/A 11/8/2019    Procedure: ESOPHAGOGASTRODUODENOSCOPY WITH ANESTHESIA;  Surgeon: Theodore Traylor MD;  Location: [facility] ENDOSCOPY;  Service: Gastroenterology    ENDOSCOPY AND COLONOSCOPY  12/04/2013    TOTAL LAPAROSCOPIC HYSTERECTOMY N/A 12/21/2017    Procedure: TOTAL LAPAROSCOPIC HYSTERECTOMY WITH DAVINCI SI ROBOT;  Surgeon: Joyce Mcfadden MD;  Location: [facility] OR;  Service:     TUBAL ABDOMINAL LIGATION         Family History:  Family History   Problem Relation Age of Onset    Colon polyps Mother     Diverticulitis Mother     Colon polyps Father     Colon polyps Maternal Grandmother     Colon cancer Neg Hx        Social History:   reports that she has never smoked. She has never used smokeless tobacco. She reports current alcohol use. She reports that she does not use drugs.    Medications:   Prior to Admission medications    Medication Sig Start Date End Date Taking? Authorizing Provider   FLUoxetine (PROzac) 20 MG capsule Take 1 capsule by mouth Daily. 4/8/21  Yes Provider, MD Kim   hydroCHLOROthiazide (HYDRODIURIL) 25 MG  "tablet Take 1 tablet by mouth Daily. 4/17/21  Yes Kim Jaramillo MD   levothyroxine (SYNTHROID, LEVOTHROID) 175 MCG tablet Take 1 tablet by mouth Daily.   Yes Kim Jaramillo MD   ondansetron ODT (ZOFRAN-ODT) 8 MG disintegrating tablet Place 1 tablet under the tongue As Needed. 9/13/22  Yes Kim Jaramillo MD   pantoprazole (PROTONIX) 40 MG EC tablet Take 1 tablet by mouth 2 (Two) Times a Day. 9/6/24  Yes Tashia Bahena APRN   Rimegepant Sulfate (Nurtec) 75 MG tablet dispersible tablet Take 1 tablet by mouth As Needed (Migraine). 9/13/24  Yes Lisa Mujica MD   tiZANidine (ZANAFLEX) 4 MG tablet Take 1 tablet by mouth At Night As Needed for Muscle Spasms. 7/12/21  Yes Dali Rankin APRN   cetirizine (zyrTEC) 10 MG tablet Take 1 tablet by mouth Daily.    Kim Jaramillo MD   dicyclomine (BENTYL) 10 MG capsule Take 1 capsule by mouth Every 6 (Six) Hours As Needed (prn). 9/6/24   Tashia Bahena APRN   nystatin-triamcinolone (MYCOLOG) 128485-3.1 UNIT/GM-% ointment As Needed. 7/28/21   Kim Jaramillo MD   sucralfate (CARAFATE) 1 g tablet Take 1 tablet by mouth Daily As Needed.    Kim Jaramillo MD   triamcinolone (KENALOG) 0.1 % ointment  11/11/22   Kim Jaramillo MD       Allergies:  Patient has no known allergies.    ROS:    General: Weight stable  Resp: No SOA  Cardiovascular: No CP    Objective     Blood pressure 124/88, pulse 70, temperature 97 °F (36.1 °C), temperature source Temporal, resp. rate 18, height 162.6 cm (64\"), weight 80.3 kg (177 lb), last menstrual period 12/09/2017, SpO2 95%, not currently breastfeeding.    Physical Exam   Constitutional: Pt is oriented to person, place, and in no distress.   Cardiovascular: Normal rate, regular rhythm.    Pulmonary/Chest: Effort normal. No respiratory distress.   Abdominal: Non-distended  Psychiatric: Mood, memory, affect and judgment appear normal.     Assessment & Plan "     Diagnosis:  Dysphagia  Screening  Anticipated Surgical Procedure:  Endoscopy  Colonoscopy  The risks, benefits, and alternatives of this procedure have been discussed with the patient or the responsible party- the patient understands and agrees to proceed.      EMR Dragon/transcription disclaimer:  Much of this encounter note is electronic transcription/translation of spoken language to printed text.  The electronic translation of spoken language may be erroneous, or at times, nonsensical words or phrases may be inadvertently transcribed.  Although I have reviewed the note for such errors, some may still exist.

## 2024-10-31 NOTE — ANESTHESIA POSTPROCEDURE EVALUATION
Patient: Allison Wilkins    Procedure Summary       Date: 10/31/24 Room / Location:  [facility] /  [facility]    Anesthesia Start: 0750 Anesthesia Stop: 0822    Procedures:       ESOPHAGOGASTRODUODENOSCOPY WITH ANESTHESIA      COLONOSCOPY WITH ANESTHESIA Diagnosis:       Encounter for screening for malignant neoplasm of colon      Family hx colonic polyps      Gastroesophageal reflux disease, unspecified whether esophagitis present      Irritable bowel syndrome without diarrhea      (Encounter for screening for malignant neoplasm of colon [Z12.11])      (Family hx colonic polyps [Z83.719])      (Gastroesophageal reflux disease, unspecified whether esophagitis present [K21.9])      (Irritable bowel syndrome without diarrhea [K58.9])    Surgeons: Theodore Traylor MD Provider: Tesfaye Gallagher CRNA    Anesthesia Type: MAC ASA Status: 2            Anesthesia Type: MAC    Vitals  Vitals Value Taken Time   BP     Temp     Pulse 88 10/31/24 0822   Resp     SpO2 97 % 10/31/24 0822   Vitals shown include unfiled device data.        Post Anesthesia Care and Evaluation    Patient location during evaluation: PACU  Patient participation: complete - patient participated  Level of consciousness: awake and awake and alert  Pain score: 0  Pain management: adequate    Airway patency: patent  Anesthetic complications: No anesthetic complications    Cardiovascular status: acceptable and stable  Respiratory status: acceptable and unassisted  Hydration status: acceptable

## 2024-10-31 NOTE — ANESTHESIA PREPROCEDURE EVALUATION
Anesthesia Evaluation     Patient summary reviewed   history of anesthetic complications:  PONV  NPO Solid Status: > 8 hours  NPO Liquid Status: > 8 hours           Airway   Mallampati: II  TM distance: <3 FB  Neck ROM: full  Dental - normal exam     Pulmonary    (-) asthma, recent URI, sleep apnea, not a smoker  Cardiovascular   Exercise tolerance: good (4-7 METS)    (+) hypertension (no longer on any medications) well controlled  (-) pacemaker, past MI, angina, cardiac stents, CABG      Neuro/Psych  (-) seizures, TIA, CVA  GI/Hepatic/Renal/Endo    (+) obesity, GERD, PUD, thyroid problem hypothyroidism  (-) liver disease, no renal disease, diabetes    Musculoskeletal     Abdominal   (+) obese   Substance History      OB/GYN          Other                          Anesthesia Plan    ASA 2     MAC     intravenous induction     Anesthetic plan, risks, benefits, and alternatives have been provided, discussed and informed consent has been obtained with: patient.

## 2024-11-20 ENCOUNTER — TELEPHONE (OUTPATIENT)
Dept: NEUROLOGY | Facility: CLINIC | Age: 49
End: 2024-11-20

## 2024-11-20 NOTE — TELEPHONE ENCOUNTER
Provider: DIAL    Caller: Allison Wilkins    Relationship to Patient: Self    Phone Number: [phone]    Reason for Call: PT NEEDS TO R/S HER BOTOX APPT ON 12/12/24.  PLEASE CALL PT TO R/S    THANK YOU

## 2024-11-21 ENCOUNTER — TELEPHONE (OUTPATIENT)
Dept: OBGYN CLINIC | Age: 49
End: 2024-11-21

## 2024-11-21 DIAGNOSIS — Z12.31 ENCOUNTER FOR SCREENING MAMMOGRAM FOR MALIGNANT NEOPLASM OF BREAST: Primary | ICD-10-CM

## 2024-11-21 NOTE — TELEPHONE ENCOUNTER
Patient called and asked that a mammogram order be put in her chart so she can schedule. Thank you.

## 2024-12-09 ENCOUNTER — TELEPHONE (OUTPATIENT)
Dept: OBGYN CLINIC | Age: 49
End: 2024-12-09

## 2024-12-09 NOTE — TELEPHONE ENCOUNTER
Called patient lv that the [facility] imaging center will call to schedule mammogram appt.

## 2024-12-12 ENCOUNTER — PROCEDURE VISIT (OUTPATIENT)
Dept: NEUROLOGY | Facility: CLINIC | Age: 49
End: 2024-12-12
Payer: COMMERCIAL

## 2024-12-12 DIAGNOSIS — G43.719 CHRONIC MIGRAINE WITHOUT AURA, INTRACTABLE, WITHOUT STATUS MIGRAINOSUS: Primary | ICD-10-CM

## 2024-12-27 ENCOUNTER — HOSPITAL ENCOUNTER (OUTPATIENT)
Dept: WOMENS IMAGING | Age: 49
Discharge: HOME | End: 2024-12-27
Attending: NURSE PRACTITIONER
Payer: COMMERCIAL

## 2024-12-27 VITALS — BODY MASS INDEX: 30.22 KG/M2 | WEIGHT: 177 LBS | HEIGHT: 64 IN

## 2024-12-27 DIAGNOSIS — Z12.31 ENCOUNTER FOR SCREENING MAMMOGRAM FOR MALIGNANT NEOPLASM OF BREAST: ICD-10-CM

## 2024-12-27 PROCEDURE — 77063 BREAST TOMOSYNTHESIS BI: CPT

## 2025-01-02 DIAGNOSIS — G43.009 MIGRAINE WITHOUT AURA AND WITHOUT STATUS MIGRAINOSUS, NOT INTRACTABLE: ICD-10-CM

## 2025-01-02 NOTE — TELEPHONE ENCOUNTER
Explained that the last script was sent to [facility].  She will call them and ask if they have it.

## 2025-01-02 NOTE — TELEPHONE ENCOUNTER
Caller: Allison Wilkins    Relationship: Self    Best call back number: [phone]    Requested Prescriptions:   Requested Prescriptions     Pending Prescriptions Disp Refills    rimegepant sulfate (Nurtec) 75 MG tablet 16 tablet 5     Sig: Take 1 tablet by mouth As Needed (Migraine).        Pharmacy where request should be sent: [facility] ([place] ADDRESS) [facility] [address] [phone] [phone]      Last office visit with prescribing clinician: Visit date not found   Last telemedicine visit with prescribing clinician: Visit date not found   Next office visit with prescribing clinician: Visit date not found     Additional details provided by patient: HAS 1 PILL LEFT. WOULD LIKE TO SEE IF SHE CAN  SAMPLES AS SCRIPT WILL COME VIA MAIL ORDER. PLEASE ADVISE, THANK YOU.    Does the patient have less than a 3 day supply:  [x] Yes  [] No    Would you like a call back once the refill request has been completed: [] Yes [x] No    If the office needs to give you a call back, can they leave a voicemail: [] Yes [x] No    Manju Lopez Rep   01/02/25 14:49 CST

## 2025-01-03 ENCOUNTER — TRANSCRIBE ORDERS (OUTPATIENT)
Dept: ADMINISTRATIVE | Facility: HOSPITAL | Age: 50
End: 2025-01-03
Payer: COMMERCIAL

## 2025-01-03 DIAGNOSIS — R07.89 OTHER CHEST PAIN: Primary | ICD-10-CM

## 2025-01-06 ENCOUNTER — TRANSCRIBE ORDERS (OUTPATIENT)
Dept: ADMINISTRATIVE | Facility: HOSPITAL | Age: 50
End: 2025-01-06
Payer: COMMERCIAL

## 2025-01-06 DIAGNOSIS — R53.83 TIREDNESS: ICD-10-CM

## 2025-01-06 DIAGNOSIS — N39.0 URINARY TRACT INFECTION WITHOUT HEMATURIA, SITE UNSPECIFIED: Primary | ICD-10-CM

## 2025-01-06 DIAGNOSIS — R73.9 BLOOD GLUCOSE ELEVATED: ICD-10-CM

## 2025-01-06 DIAGNOSIS — E78.00 PURE HYPERCHOLESTEROLEMIA: ICD-10-CM

## 2025-01-06 DIAGNOSIS — N95.1 SYMPTOMATIC MENOPAUSAL OR FEMALE CLIMACTERIC STATES: ICD-10-CM

## 2025-01-13 ENCOUNTER — TELEPHONE (OUTPATIENT)
Dept: NEUROLOGY | Facility: CLINIC | Age: 50
End: 2025-01-13
Payer: COMMERCIAL

## 2025-01-17 ENCOUNTER — OFFICE VISIT (OUTPATIENT)
Dept: OBGYN CLINIC | Age: 50
End: 2025-01-17
Payer: COMMERCIAL

## 2025-01-17 VITALS
SYSTOLIC BLOOD PRESSURE: 119 MMHG | WEIGHT: 173 LBS | DIASTOLIC BLOOD PRESSURE: 82 MMHG | HEART RATE: 71 BPM | BODY MASS INDEX: 29.7 KG/M2

## 2025-01-17 DIAGNOSIS — R10.2 PELVIC PAIN: ICD-10-CM

## 2025-01-17 DIAGNOSIS — Z12.31 SCREENING MAMMOGRAM FOR BREAST CANCER: ICD-10-CM

## 2025-01-17 DIAGNOSIS — Z12.39 ENCOUNTER FOR SCREENING BREAST EXAMINATION: ICD-10-CM

## 2025-01-17 DIAGNOSIS — Z12.31 ENCOUNTER FOR SCREENING MAMMOGRAM FOR MALIGNANT NEOPLASM OF BREAST: ICD-10-CM

## 2025-01-17 DIAGNOSIS — Z01.419 WELL WOMAN EXAM WITH ROUTINE GYNECOLOGICAL EXAM: Primary | ICD-10-CM

## 2025-01-17 DIAGNOSIS — Z90.711 HISTORY OF PARTIAL HYSTERECTOMY: ICD-10-CM

## 2025-01-17 LAB
BILIRUB UR QL STRIP: NEGATIVE
CLARITY UR: CLEAR
COLOR UR: YELLOW
GLUCOSE UR STRIP.AUTO-MCNC: NEGATIVE MG/DL
HGB UR STRIP.AUTO-MCNC: NEGATIVE MG/L
KETONES UR STRIP.AUTO-MCNC: NEGATIVE MG/DL
LEUKOCYTE ESTERASE UR QL STRIP.AUTO: NEGATIVE
NITRITE UR QL STRIP.AUTO: NEGATIVE
PH UR STRIP.AUTO: 7 [PH] (ref 5–8)
PROT UR STRIP.AUTO-MCNC: NEGATIVE MG/DL
SP GR UR STRIP.AUTO: 1.01 (ref 1–1.03)
UROBILINOGEN UR STRIP.AUTO-MCNC: 0.2 E.U./DL

## 2025-01-17 PROCEDURE — 99396 PREV VISIT EST AGE 40-64: CPT | Performed by: NURSE PRACTITIONER

## 2025-01-17 RX ORDER — RIMEGEPANT SULFATE 75 MG/75MG
TABLET, ORALLY DISINTEGRATING ORAL
COMMUNITY
Start: 2023-01-17

## 2025-01-17 ASSESSMENT — ENCOUNTER SYMPTOMS
RESPIRATORY NEGATIVE: 1
GASTROINTESTINAL NEGATIVE: 1
ALLERGIC/IMMUNOLOGIC NEGATIVE: 1
EYES NEGATIVE: 1

## 2025-01-17 NOTE — PROGRESS NOTES
Breast Cancer Neg Hx     Colon Cancer Neg Hx     Ovarian Cancer Neg Hx      Social History     Tobacco Use    Smoking status: Never    Smokeless tobacco: Never   Substance Use Topics    Alcohol use: Yes     Comment: occasional       Current Outpatient Medications   Medication Sig Dispense Refill    rimegepant sulfate (NURTEC) 75 MG TBDP       dicyclomine (BENTYL) 10 MG capsule       FLUoxetine (PROZAC) 20 MG capsule       hydroCHLOROthiazide (HYDRODIURIL) 25 MG tablet TAKE 1 TABLET BY MOUTH ONCE DAILY IN THE MORNING 90 tablet 3    ondansetron (ZOFRAN ODT) 4 MG disintegrating tablet Take 1 tablet by mouth every 8 hours as needed for Nausea or Vomiting 30 tablet 0    tiZANidine (ZANAFLEX) 4 MG tablet TAKE (1) TABLET BY MOUTH EVERY SIX HOURS AS NEEDED FOR PAIN. 60 tablet 0    Pantoprazole Sodium (PROTONIX PO) Take by mouth      levothyroxine (SYNTHROID) 175 MCG tablet Take 1 tablet by mouth daily      cetirizine (ZYRTEC) 10 MG tablet Take 1 tablet by mouth daily       No current facility-administered medications for this visit.     No Known Allergies  Vitals:    01/17/25 0906   BP: 119/82   Pulse: 71   Weight: 78.5 kg (173 lb)     Body mass index is 29.7 kg/m².    A comprehensive review of systems was negative except for what was noted in the HPI.     Physical Exam  Constitutional:       Appearance: Normal appearance.   Genitourinary:      Urethral meatus normal.      No lesions in the vagina.      Right Labia: No rash, lesions or skin changes.     Left Labia: No lesions, skin changes or rash.     Vaginal cuff intact.     No vaginal erythema.      No vaginal prolapse present.     Cervix is absent.      Uterus is absent.      Pelvic floor neuro is intact.  Breasts:     Right: Normal. No swelling, mass, nipple discharge, skin change or tenderness.      Left: Normal. No swelling, mass, nipple discharge, skin change or tenderness.   HENT:      Head: Normocephalic and atraumatic.      Nose: Nose normal.   Eyes:

## 2025-01-17 NOTE — PATIENT INSTRUCTIONS
much sun, wash your hands, brush your teeth twice a day, and wear a seat belt in the car.   Where can you learn more?  Go to https://www.[employer].net/patientEd and enter P072 to learn more about \"Well Visit, Ages 18 to 65: Care Instructions.\"  Current as of: April 30, 2024  Content Version: 14.3  © 2024 [employer].   Care instructions adapted under license by [employer]. If you have questions about a medical condition or this instruction, always ask your healthcare professional. [employer], disclaims any warranty or liability for your use of this information.     Patient Education        A Healthy Lifestyle: Care Instructions  A healthy lifestyle can help you feel good, have more energy, and stay at a weight that's healthy for you. You can share a healthy lifestyle with your friends and family. And you can do it on your own.    Eat meals with your friends or family. You could try cooking together.   Plan activities with other people. Go for a walk with a friend, try a free online fitness class, or join a sports league.     Eat a variety of healthy foods. These include fruits, vegetables, whole grains, low-fat dairy, and lean protein.   Choose healthy portions of food. You can use the Nutrition Facts label on food packages as a guide.     Eat more fruits and vegetables. You could add vegetables to sandwiches or add fruit to cereal.   Drink water when you are thirsty. Limit soda, juice, and sports drinks.     Try to exercise most days. Aim for at least 2½ hours of exercise each week.   Keep moving. Work in the garden or take your dog on a walk. Use the stairs instead of the elevator.     If you use tobacco or nicotine, try to quit. Ask your doctor about programs and medicines to help you quit.   Limit alcohol. Men should have no more than 2 drinks a day. Women should have no more than 1. For some people, no alcohol is the best choice.   Follow-up care is a key part of your treatment and

## 2025-01-17 NOTE — PROGRESS NOTES
FLUoxetine (PROZAC) 20 MG capsule       hydroCHLOROthiazide (HYDRODIURIL) 25 MG tablet TAKE 1 TABLET BY MOUTH ONCE DAILY IN THE MORNING 90 tablet 3    ondansetron (ZOFRAN ODT) 4 MG disintegrating tablet Take 1 tablet by mouth every 8 hours as needed for Nausea or Vomiting 30 tablet 0    tiZANidine (ZANAFLEX) 4 MG tablet TAKE (1) TABLET BY MOUTH EVERY SIX HOURS AS NEEDED FOR PAIN. 60 tablet 0    Pantoprazole Sodium (PROTONIX PO) Take by mouth      levothyroxine (SYNTHROID) 175 MCG tablet Take 1 tablet by mouth daily      cetirizine (ZYRTEC) 10 MG tablet Take 1 tablet by mouth daily       No current facility-administered medications for this visit.     No Known Allergies  Vitals:    01/17/25 0906   BP: 119/82   Pulse: 71     Body mass index is 29.7 kg/m².    Review of Systems   Constitutional: Negative.    HENT: Negative.     Eyes: Negative.    Respiratory: Negative.     Cardiovascular: Negative.    Gastrointestinal: Negative.    Endocrine: Negative.    Genitourinary:  Positive for pelvic pain. Negative for difficulty urinating, dyspareunia, dysuria, frequency, urgency, vaginal bleeding, vaginal discharge and vaginal pain. Decreased urine volume: pressure.  Musculoskeletal: Negative.    Skin: Negative.    Allergic/Immunologic: Negative.    Neurological: Negative.    Hematological: Negative.    Psychiatric/Behavioral: Negative.         Physical Exam  Vitals and nursing note reviewed.   Constitutional:       Appearance: She is well-developed.   HENT:      Head: Normocephalic and atraumatic.   Eyes:      Conjunctiva/sclera: Conjunctivae normal.      Pupils: Pupils are equal, round, and reactive to light.   Cardiovascular:      Rate and Rhythm: Normal rate and regular rhythm.      Heart sounds: Normal heart sounds.   Pulmonary:      Effort: Pulmonary effort is normal.   Chest:      Comments: Breasts symmetrical without tenderness, masses, or nipple discharge. Nipples everted bilaterally.    Abdominal:      Palpations:

## 2025-01-23 DIAGNOSIS — R10.2 PELVIC PAIN: ICD-10-CM

## 2025-02-07 ENCOUNTER — OFFICE VISIT (OUTPATIENT)
Dept: NEUROLOGY | Facility: CLINIC | Age: 50
End: 2025-02-07
Payer: COMMERCIAL

## 2025-02-07 VITALS
HEIGHT: 64 IN | HEART RATE: 74 BPM | SYSTOLIC BLOOD PRESSURE: 124 MMHG | WEIGHT: 177 LBS | BODY MASS INDEX: 30.22 KG/M2 | DIASTOLIC BLOOD PRESSURE: 62 MMHG | OXYGEN SATURATION: 98 %

## 2025-02-07 DIAGNOSIS — M79.18 CERVICAL MYOFASCIAL PAIN SYNDROME: ICD-10-CM

## 2025-02-07 DIAGNOSIS — Z72.820 POOR SLEEP: ICD-10-CM

## 2025-02-07 DIAGNOSIS — R06.83 SNORING: ICD-10-CM

## 2025-02-07 DIAGNOSIS — G43.719 CHRONIC MIGRAINE WITHOUT AURA, INTRACTABLE, WITHOUT STATUS MIGRAINOSUS: Primary | ICD-10-CM

## 2025-02-07 RX ORDER — SEMAGLUTIDE 0.25 MG/.5ML
0.25 INJECTION, SOLUTION SUBCUTANEOUS
COMMUNITY
Start: 2025-01-31 | End: 2025-03-03

## 2025-02-07 RX ORDER — ERGOCALCIFEROL 1.25 MG/1
50000 CAPSULE, LIQUID FILLED ORAL WEEKLY
COMMUNITY

## 2025-02-07 NOTE — PROGRESS NOTES
Neurology Consult Note    [facility]  [address], [address]  [place] [address]  Phone: [phone]  Fax: [phone]    Referring Provider:   No ref. provider found  Primary Care Provider:  Rodney George MD    Reason for Consult:  Chronic migraine  Subjective        Allison Palak Wilkins presents to [facility]    History of Present Illness  49-year-old female seen for the follow-up of chronic migraine.  Last seen in clinic 5/17/2024 by DEEPTI Landaverde.  She had noted no migraines since her previous encounter.  She does have some known migrainous headaches however they are nondisabling.  Also reporting intermittent neck pain.  She still takes Nurtec on occasion but no complaints.  She does have a prior history of medication overuse headaches per review of her record.    Today patient presents by herself.  Reports to me since last being seen she has had no disabling migraines.  She does report approximately 3-4 times weekly she can have a headache.  This is started in the right side or in her neck.  They are not disabling.  She is taking over-the-counter medications for these.  She has woken up with these as well in the mornings.  Additionally she notes she can snore.  She also can nap during the day.  She does tell me Dr. Lucas's office set her up with a sleep study at home however she had to cancel this due to family issues at home.    She does continue with some neck pain.  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  She does see a chiropractor every couple weeks and this does help.  She has seen physical therapy in the past which provided some benefit.    Preventative medications tried:  Fluoxetine  Lisinopril    Abortive medications tried:  Imitrex  Maxalt  BC powders  Tylenol  Ibuprofen  Fioricet    Medications contraindicated:    Patient Active Problem List   Diagnosis    Menorrhagia with irregular cycle     Epigastric pain    Hypothyroidism    PMS (premenstrual syndrome)    Headache associated with hormonal factors    Chronic migraine without aura, intractable, without status migrainosus    Cervical myofascial pain syndrome    Encounter for screening for malignant neoplasm of colon    Family hx colonic polyps    Gastroesophageal reflux disease    Irritable bowel syndrome without diarrhea        Past Medical History:   Diagnosis Date    Stewart syndrome     Disease of thyroid gland     Eosinophilic esophagitis     GERD (gastroesophageal reflux disease)     Heavy periods     with pelvic pain    Hemorrhoids     IBS (irritable bowel syndrome)         Social History     Socioeconomic History    Marital status:    Tobacco Use    Smoking status: Never    Smokeless tobacco: Never   Vaping Use    Vaping status: Never Used   Substance and Sexual Activity    Alcohol use: Yes     Comment: rare    Drug use: No    Sexual activity: Defer        No Known Allergies       Current Outpatient Medications:     cetirizine (zyrTEC) 10 MG tablet, Take 1 tablet by mouth Daily., Disp: , Rfl:     dicyclomine (BENTYL) 10 MG capsule, Take 1 capsule by mouth Every 6 (Six) Hours As Needed (prn)., Disp: 60 capsule, Rfl: 2    FLUoxetine (PROzac) 20 MG capsule, Take 1 capsule by mouth Daily., Disp: , Rfl:     hydroCHLOROthiazide (HYDRODIURIL) 25 MG tablet, Take 1 tablet by mouth Daily., Disp: , Rfl:     levothyroxine (SYNTHROID, LEVOTHROID) 175 MCG tablet, Take 1 tablet by mouth Daily., Disp: , Rfl:     nystatin-triamcinolone (MYCOLOG) 223813-0.1 UNIT/GM-% ointment, As Needed., Disp: , Rfl:     ondansetron ODT (ZOFRAN-ODT) 8 MG disintegrating tablet, Place 1 tablet under the tongue As Needed., Disp: , Rfl:     pantoprazole (PROTONIX) 40 MG EC tablet, Take 1 tablet by mouth 2 (Two) Times a Day., Disp: 60 tablet, Rfl: 11    Rimegepant Sulfate (Nurtec) 75 MG tablet dispersible tablet, Take 1 tablet by mouth As Needed (Migraine)., Disp: 16 tablet,  "Rfl: 5    Semaglutide-Weight Management (Wegovy) 0.25 MG/0.5ML solution auto-injector, Inject 0.5 mL under the skin into the appropriate area as directed Every 7 (Seven) Days. (Patient taking differently: Inject 0.5 mL under the skin into the appropriate area as directed Every 7 (Seven) Days. Waiting for authorization to come through. Has not started), Disp: , Rfl:     sucralfate (CARAFATE) 1 g tablet, Take 1 tablet by mouth Daily As Needed., Disp: , Rfl:     tiZANidine (ZANAFLEX) 4 MG tablet, Take 1 tablet by mouth At Night As Needed for Muscle Spasms., Disp: 30 tablet, Rfl: 0    triamcinolone (KENALOG) 0.1 % ointment, , Disp: , Rfl:     vitamin D (ERGOCALCIFEROL) 1.25 MG (94216 UT) capsule capsule, Take 1 capsule by mouth 1 (One) Time Per Week., Disp: , Rfl:        Objective   Vital Signs:   /62   Pulse 74   Ht 162.6 cm (64\")   Wt 80.3 kg (177 lb)   SpO2 98%   BMI 30.38 kg/m²       Physical Exam  Vitals and nursing note reviewed.   Constitutional:       Appearance: Normal appearance.   Eyes:      General: Lids are normal.      Extraocular Movements: Extraocular movements intact.   Pulmonary:      Effort: Pulmonary effort is normal. No respiratory distress.   Skin:     General: Skin is warm and dry.   Neurological:      Mental Status: She is alert.   Psychiatric:         Speech: Speech normal.        Neurological Exam  Mental Status  Alert. Oriented to person, place, time and situation. Speech is normal. Language is fluent with no aphasia.    Cranial Nerves  CN III, IV, VI: Extraocular movements intact bilaterally. Normal lids and orbits bilaterally.  CN VII: Full and symmetric facial movement.    Motor    Moves all extremities equally.    Gait  Casual gait is normal including stance, stride, and arm swing.      Result Review :   The following data was reviewed by: DEEPTI Claros on 02/07/2025:       MRI Brain With & Without Contrast (07/09/2021 13:12)   pantera Result    Narrative & Impression "   MRI BRAIN W WO CONTRAST- 7/9/2021 12:45 PM CDT     HISTORY: Headache, chronic, no new features; R51.9-Headache,  unspecified; G43.009-Migraine without aura, not intractable, without  status migrainosus     COMPARISON: None.       TECHNIQUE: Multiplanar imaging of the brain was performed in a routine  fashion before and after the intravenous injection of gadolinium  contrast.     FINDINGS:   Diffusion: No restriction of diffusion to suggest acute ischemia.     Midline structures: Nondisplaced.     Ventricles: Normal in configuration and symmetric in size.     Masses: No masses or mass effect.     Basilar cisterns: Maintained.     Extra axial space: No abnormal extra-axial fluid.     Gray-white matter signal: Normal signal and differentiation.     Cerebellum: Normal.     Brainstem: Normal.     Enhancement: No abnormal enhancement.     Other: Proximal cervical spinal cord is normal. Bilateral globes and  orbits are normal in appearance. Normal cerebrovascular flow voids  noted. A 10 mm mucous retention cyst or polyp is noted within the  posterior left ethmoid air cell. The visualized paranasal sinuses and  mastoid air cells are otherwise normally aerated.     IMPRESSION:  1. Unremarkable MRI examination of the brain.         This report was finalized on 07/09/2021 16:06 by Dr. Murtaza Delgado MD.     MRI Cervical Spine Without Contrast (07/09/2021 12:42)     Study Result    Narrative & Impression   MRI CERVICAL SPINE WO CONTRAST- 7/9/2021 12:42 PM CDT     HISTORY: Cervical radiculopathy, no red flags; M79.18-Myalgia, other  site; M54.12-Radiculopathy, cervical region     COMPARISON: None      TECHNIQUE: Multiplanar, multisequence MRI of the cervical spine was  obtained without contrast.      FINDINGS:   Imaged portions of the cerebellum and brainstem are unremarkable.      Alignment: There is loss of normal cervical lordosis. There is no  evidence of listhesis..      Marrow signal: No pathologic marrow infiltrate  is demonstrated. The  vertebral body heights and posterior elements are maintained.      Cord/Canal: The spinal cord is normal in signal and morphology.      Soft tissues: The surrounding soft tissues are unremarkable.      Levels:   C2-C3: No disc bulge is present. No significant neuroforaminal or  central canal stenosis is seen.      C3-C4: There is mild bulging of the disc. There is no central or  foraminal stenosis..      C4-C5: There is mild bulging of the disc. There is no contact with or  contouring of the cord. No significant foraminal narrowing is present..      C5-C6: There is mild bulging of the disc. There is no central or  foraminal stenosis..      C6-C7: There is a central disc protrusion at this level without evidence  of contact with or contouring of the cord. There is no associated  central stenosis. No right foraminal narrowing is present. Facet and  uncovertebral hypertrophy does result in mild left-sided foraminal  narrowing. A dilated perineural nerve root sleeve is noted on the left..        C7-T1: There is mild bulging of the disc. There is no contact with or  contouring of the cord or central stenosis. Facet and uncovertebral  hypertrophy results in moderate bilateral neural foraminal narrowing. A  dilated perineural nerve root sleeve is noted on the left.           IMPRESSION:  1. Loss of the cervical lordosis. There is mild bulging of the disc and  disc protrusions at several levels but with no contact with or  contouring the cord the cord. There is no abnormal T2 signal within the  cervical cord. No evidence of central spinal stenosis.  2. Facet and uncovertebral hypertrophy does contribute to left-sided  foraminal narrowing at C6-C7 and C7-T1 and right-sided foraminal  narrowing at C7-T1. Dilated perineural nerve root sleeves are noted on  the left at the C6-C7 and C7-T1 level.        This report was finalized on 07/09/2021 16:14 by Dr. Murtaza Delgado MD.       Office Visit with  Vivek Diaz PA (2021)  Office Visit with Nico Mayorga APRN (2021)  Office Visit with Nico Mayorga APRN (2024)  Office Visit with Nico Mayorga APRN (2024)               Impression:  Allison Wilkins is a 49 y.o. female who presents for follow-up of chronic migraine.  Overall patient's migraine regimen is satisfactory.  She may have 1 disabling migraine a month.  However the headache she is experiencing approximately 3-4 times weekly could be related to tension versus sleep related.  She does snore.  She was sent for home sleep study in the past however had to stop this due to family stressors at home.  I would encourage her to do the home sleep study due to concerns of sleep apnea.  This can lead into headache syndromes.  No red flags on today's visit.    Diagnoses and all orders for this visit:    1. Chronic migraine without aura, intractable, without status migrainosus (Primary)    2. Poor sleep    3. Snoring    4. Cervical myofascial pain syndrome        Plan:  Continue Botox for chronic migraine prevention  Continue Nurtec 75 mg ODT for migraine   Reschedule home sleep study  Avoid any known triggers  Ensure adequate sleep  Recommend utilizing migraine driss on phone to track migraines as well as triggers  Avoid excessive use of over-the-counter medications.  Follow-up with primary care as scheduled  Follow-up with Botox as scheduled  Follow-up me in 6 months or sooner if needed    The patient and I have discussed the plan of care and she is in full agreement at this time.     Follow Up   Return in about 6 months (around 2025) for Migraine.            DEEPTI Claros  25  10:10 CST

## 2025-03-20 ENCOUNTER — PROCEDURE VISIT (OUTPATIENT)
Dept: NEUROLOGY | Facility: CLINIC | Age: 50
End: 2025-03-20
Payer: COMMERCIAL

## 2025-03-20 DIAGNOSIS — G43.719 CHRONIC MIGRAINE WITHOUT AURA, INTRACTABLE, WITHOUT STATUS MIGRAINOSUS: Primary | ICD-10-CM

## 2025-03-25 DIAGNOSIS — G43.009 MIGRAINE WITHOUT AURA AND WITHOUT STATUS MIGRAINOSUS, NOT INTRACTABLE: ICD-10-CM

## 2025-03-25 NOTE — TELEPHONE ENCOUNTER
Received a call from the hub, she needs samples while waiting for her pharmacy to receive Nurtec.

## 2025-06-05 NOTE — TELEPHONE ENCOUNTER
Caller: [facility]    Relationship:     Best call back number: [phone]    What medications are you currently taking:   Current Outpatient Medications on File Prior to Visit   Medication Sig Dispense Refill   • amoxicillin (AMOXIL) 500 MG capsule Take 500 mg by mouth Daily.     • cetirizine (zyrTEC) 10 MG tablet Take 10 mg by mouth Daily.     • dicyclomine (BENTYL) 10 MG capsule TAKE (1) CAPSULE BY MOUTH EVERY SIX HOURS AS NEEDED (FOR ABDOMINAL CRAMPING WITH DIARRHEA AFTER EATING) (Patient taking differently: Take 10 mg by mouth As Needed.) 60 capsule 5   • FLUoxetine (PROzac) 20 MG capsule Take 20 mg by mouth Daily.     • hydroCHLOROthiazide (HYDRODIURIL) 25 MG tablet Take 25 mg by mouth Daily.     • levothyroxine (SYNTHROID, LEVOTHROID) 175 MCG tablet Take 175 mcg by mouth Daily.     • ondansetron ODT (ZOFRAN-ODT) 4 MG disintegrating tablet Place 4 mg on the tongue Every 8 (Eight) Hours As Needed for Nausea.     • pantoprazole (PROTONIX) 40 MG EC tablet Take 40 mg by mouth Daily.     • Rimegepant Sulfate (NURTEC) 75 MG tablet dispersible tablet Take 1 tablet by mouth Take As Directed. TAKE ONE TABLET AT ONSET OF MIGRAINE. DO NOT REPEAT FOR 24 HOURS. 8 tablet 6   • sucralfate (CARAFATE) 1 g tablet Take 1 g by mouth Daily As Needed.     • tiZANidine (ZANAFLEX) 4 MG tablet Take 1 tablet by mouth At Night As Needed for Muscle Spasms. 30 tablet 0     No current facility-administered medications on file prior to visit.          When did you start taking these medications: NA    Which medication are you concerned about: NURTEC    Who prescribed you this medication: ERIC PEREZ LAST SIGNED OFF ON RX    What are your concerns: [facility] IS CALLING TO SEE IF OFFICE WANTS TO FILE AN APPEAL FOR DENIAL OF RX AND CONTINUE WITH THE BRIDGE PROGRAM OF IF OTHER THERAPEUTIC RX WAS BEING LOOKED INTO. PLEASE ADVISE.     How long have you had these concerns: NA        
  Pharmacy Name:  [facility]     Pharmacy representative name: JACQUIE    Pharmacy representative phone number: [phone]    What medication are you calling in regards to:  NURTEC    What question does the pharmacy have:  CALLING BACK TO CHECK IF PROVIDER IS GOING TO FILE APPEAL FOR MEDICATION   PLEASE ADVISE     Who is the provider that prescribed the medication:  [facility]SA [facility]     Additional notes:  
Allison and I spoke about the PA and trying to get the medication approved. She is very understanding.   Allison relays her and her family have been sick with Covid and she has not been able to go to PT for the last few weeks, she will return when she is able to get an apt for PT. I will leave her some samples of Nurtec at the . Morning HA are worse and she is concerned that the Covid has made them worse.   I will call her again after speaking to Vince BERNARD  
I am waiting for them to call me back to do a peer to peer. LS  
Dr. Meadows

## 2025-06-06 ENCOUNTER — HOSPITAL ENCOUNTER (OUTPATIENT)
Dept: ULTRASOUND IMAGING | Age: 50
Discharge: HOME | End: 2025-06-06
Payer: COMMERCIAL

## 2025-06-06 DIAGNOSIS — R10.2 PELVIC PAIN: ICD-10-CM

## 2025-06-06 PROCEDURE — 76830 TRANSVAGINAL US NON-OB: CPT

## 2025-06-13 ENCOUNTER — PROCEDURE VISIT (OUTPATIENT)
Dept: NEUROLOGY | Facility: CLINIC | Age: 50
End: 2025-06-13
Payer: COMMERCIAL

## 2025-06-13 DIAGNOSIS — G43.009 MIGRAINE WITHOUT AURA AND WITHOUT STATUS MIGRAINOSUS, NOT INTRACTABLE: Primary | ICD-10-CM

## 2025-07-02 ENCOUNTER — RESULTS FOLLOW-UP (OUTPATIENT)
Dept: OBGYN CLINIC | Age: 50
End: 2025-07-02

## 2025-08-08 ENCOUNTER — OFFICE VISIT (OUTPATIENT)
Dept: NEUROLOGY | Facility: CLINIC | Age: 50
End: 2025-08-08
Payer: COMMERCIAL

## 2025-08-08 VITALS
BODY MASS INDEX: 30.22 KG/M2 | DIASTOLIC BLOOD PRESSURE: 76 MMHG | HEIGHT: 64 IN | OXYGEN SATURATION: 98 % | SYSTOLIC BLOOD PRESSURE: 122 MMHG | WEIGHT: 177 LBS | HEART RATE: 64 BPM

## 2025-08-08 DIAGNOSIS — M79.18 CERVICAL MYOFASCIAL PAIN SYNDROME: ICD-10-CM

## 2025-08-08 DIAGNOSIS — G43.719 CHRONIC MIGRAINE WITHOUT AURA, INTRACTABLE, WITHOUT STATUS MIGRAINOSUS: Primary | ICD-10-CM

## 2025-08-27 DIAGNOSIS — G43.719 CHRONIC MIGRAINE WITHOUT AURA, INTRACTABLE, WITHOUT STATUS MIGRAINOSUS: ICD-10-CM

## (undated) DEVICE — 1000 SES SMOKE EVACUATION SYSTEM, HAND HELD TUBING SET: Brand: 1000 SES

## (undated) DEVICE — CUFF,BP,DISP,1 TUBE,ADULT,HP: Brand: MEDLINE

## (undated) DEVICE — ENDOPATH XCEL BLUNT TIP TROCARS WITH SMOOTH SLEEVES: Brand: ENDOPATH XCEL

## (undated) DEVICE — GOWN,NON-REINFORCED,SIRUS,SET IN SLV,XL: Brand: MEDLINE

## (undated) DEVICE — Device: Brand: DEFENDO AIR/WATER/SUCTION AND BIOPSY VALVE

## (undated) DEVICE — SKIN AFFIX SURG ADHESIVE 72/CS 0.55ML: Brand: MEDLINE

## (undated) DEVICE — CONMED SCOPE SAVER BITE BLOCK, 20X27 MM: Brand: SCOPE SAVER

## (undated) DEVICE — SENSR O2 OXIMAX FNGR A/ 18IN NONSTR

## (undated) DEVICE — MASK,OXYGEN,MED CONC,ADLT,7' TUB, UC: Brand: PENDING

## (undated) DEVICE — DRP WARMR SCOPE PILLOW SCPE 44X66IN STRL

## (undated) DEVICE — THE CHANNEL CLEANING BRUSH IS A NYLON FLEXI BRUSH ATTACHED TO A FLEXIBLE PLASTIC SHEATH DESIGNED TO SAFELY REMOVE DEBRIS FROM FLEXIBLE ENDOSCOPES.

## (undated) DEVICE — GLV SURG BIOGEL LTX PF 6 1/2

## (undated) DEVICE — GLV SURG TRIUMPH ORTHO W/ALOE PF LTX 7.0

## (undated) DEVICE — DEFOGGER!" ANTI FOG KIT: Brand: DEROYAL

## (undated) DEVICE — SUT VIC 0 UR6 27IN VCP603H

## (undated) DEVICE — Device

## (undated) DEVICE — ST TBG AIRSEAL FLTR TRI LUM

## (undated) DEVICE — CVR DISP HUG U VAC STEEP TREND

## (undated) DEVICE — OBT BLADLES ENDOWRIST DAVINCI/S 8MM

## (undated) DEVICE — YANKAUER,BULB TIP WITH VENT: Brand: ARGYLE

## (undated) DEVICE — WIPE THERAWASH SLV SPEC CARE 2PK

## (undated) DEVICE — GLV SURG TRIUMPH NATURAL W/ALOE PF LTX 7 STRL

## (undated) DEVICE — ANTIBACTERIAL UNDYED BRAIDED (POLYGLACTIN 910), SYNTHETIC ABSORBABLE SUTURE: Brand: COATED VICRYL

## (undated) DEVICE — 3M™ WARMING BLANKET, UPPER BODY, 10 PER CASE, 42268: Brand: BAIR HUGGER™

## (undated) DEVICE — DUAL LUMEN STOMACH TUBE: Brand: SALEM SUMP

## (undated) DEVICE — GLV SURG TRIUMPH NATURAL W/ALOE PF LTX 6 STRL

## (undated) DEVICE — APPL HEMO SURG ARISTA/AH/FLEXITIP XL 38CM

## (undated) DEVICE — HEWSON SUTURE RETRIEVER: Brand: HEWSON SUTURE RETRIEVER

## (undated) DEVICE — FRCP BX RADJAW4 NDL 2.8 240 STD OG

## (undated) DEVICE — DAVINCI: Brand: MEDLINE INDUSTRIES, INC.

## (undated) DEVICE — TBG SMPL FLTR LINE NASL 02/C02 A/ BX/100

## (undated) DEVICE — ENDOGATOR AUXILIARY WATER JET CONNECTOR: Brand: ENDOGATOR

## (undated) DEVICE — 2, DISPOSABLE SUCTION/IRRIGATOR WITHOUT DISPOSABLE TIP: Brand: STRYKEFLOW

## (undated) DEVICE — TOTAL TRAY, 16FR 10ML SIL FOLEY, URN: Brand: MEDLINE

## (undated) DEVICE — TIP COVER ACCESSORY

## (undated) DEVICE — VCARE MEDIUM, UTERINE MANIPULATOR, VAGINAL-CERVICAL-AHLUWALIA'S-RETRACTOR-ELEVATOR: Brand: VCARE

## (undated) DEVICE — SUT MNCRYL 4/0 PS2 27IN UD MCP426H

## (undated) DEVICE — PK TURNOVER RM ADV

## (undated) DEVICE — TROC ANCHORPORT BLADELES W/GRIP 12X120MM

## (undated) DEVICE — SUT PDS 0 CT 36IN VIO PDP358T

## (undated) DEVICE — HDRST INTUB GENTLETOUCH SLOT 7IN RT